# Patient Record
Sex: FEMALE | Race: WHITE | NOT HISPANIC OR LATINO | Employment: OTHER | URBAN - METROPOLITAN AREA
[De-identification: names, ages, dates, MRNs, and addresses within clinical notes are randomized per-mention and may not be internally consistent; named-entity substitution may affect disease eponyms.]

---

## 2022-01-31 ENCOUNTER — HOSPITAL ENCOUNTER (INPATIENT)
Facility: HOSPITAL | Age: 77
LOS: 7 days | Discharge: HOME HEALTH | DRG: 388 | End: 2022-02-08
Attending: EMERGENCY MEDICINE | Admitting: INTERNAL MEDICINE
Payer: MEDICARE

## 2022-01-31 ENCOUNTER — APPOINTMENT (EMERGENCY)
Dept: CT IMAGING | Facility: HOSPITAL | Age: 77
DRG: 388 | End: 2022-01-31
Payer: MEDICARE

## 2022-01-31 DIAGNOSIS — R93.3 ABNORMAL CT SCAN, ESOPHAGUS: ICD-10-CM

## 2022-01-31 DIAGNOSIS — R93.3 ABNORMAL CT SCAN, COLON: ICD-10-CM

## 2022-01-31 DIAGNOSIS — K56.7 ILEUS (HCC): ICD-10-CM

## 2022-01-31 DIAGNOSIS — U07.1 COVID-19: Primary | ICD-10-CM

## 2022-01-31 PROBLEM — R32 URINARY INCONTINENCE: Status: ACTIVE | Noted: 2022-01-31

## 2022-01-31 PROBLEM — G40.909 SEIZURE DISORDER (HCC): Status: ACTIVE | Noted: 2022-01-31

## 2022-01-31 PROBLEM — E87.1 HYPONATREMIA: Status: ACTIVE | Noted: 2022-01-31

## 2022-01-31 PROBLEM — I10 PRIMARY HYPERTENSION: Status: ACTIVE | Noted: 2022-01-31

## 2022-01-31 PROBLEM — E03.9 ACQUIRED HYPOTHYROIDISM: Status: ACTIVE | Noted: 2022-01-31

## 2022-01-31 PROBLEM — F20.89 OTHER SCHIZOPHRENIA (HCC): Status: ACTIVE | Noted: 2022-01-31

## 2022-01-31 PROBLEM — G20 PARKINSON'S DISEASE (HCC): Status: ACTIVE | Noted: 2022-01-31

## 2022-01-31 LAB
ALBUMIN SERPL BCP-MCNC: 3.7 G/DL (ref 3.5–5)
ALP SERPL-CCNC: 70 U/L (ref 34–104)
ALT SERPL W P-5'-P-CCNC: 5 U/L (ref 7–52)
ANION GAP SERPL CALCULATED.3IONS-SCNC: 6 MMOL/L (ref 4–13)
APTT PPP: 34 SECONDS (ref 23–37)
AST SERPL W P-5'-P-CCNC: 16 U/L (ref 13–39)
BASOPHILS # BLD AUTO: 0.03 THOUSANDS/ΜL (ref 0–0.1)
BASOPHILS NFR BLD AUTO: 0 % (ref 0–1)
BILIRUB SERPL-MCNC: 0.28 MG/DL (ref 0.2–1)
BILIRUB UR QL STRIP: NEGATIVE
BUN SERPL-MCNC: 49 MG/DL (ref 5–25)
CALCIUM SERPL-MCNC: 9.4 MG/DL (ref 8.4–10.2)
CARDIAC TROPONIN I PNL SERPL HS: 5 NG/L
CHLORIDE SERPL-SCNC: 95 MMOL/L (ref 96–108)
CK SERPL-CCNC: 104 U/L (ref 26–192)
CLARITY UR: ABNORMAL
CO2 SERPL-SCNC: 29 MMOL/L (ref 21–32)
COLOR UR: YELLOW
CREAT SERPL-MCNC: 0.92 MG/DL (ref 0.6–1.3)
CRP SERPL QL: 34.7 MG/L
D DIMER PPP FEU-MCNC: 1.2 UG/ML FEU
EOSINOPHIL # BLD AUTO: 0.08 THOUSAND/ΜL (ref 0–0.61)
EOSINOPHIL NFR BLD AUTO: 1 % (ref 0–6)
ERYTHROCYTE [DISTWIDTH] IN BLOOD BY AUTOMATED COUNT: 14.7 % (ref 11.6–15.1)
FLUAV RNA RESP QL NAA+PROBE: NEGATIVE
FLUBV RNA RESP QL NAA+PROBE: NEGATIVE
GFR SERPL CREATININE-BSD FRML MDRD: 61 ML/MIN/1.73SQ M
GLUCOSE SERPL-MCNC: 122 MG/DL (ref 65–140)
GLUCOSE UR STRIP-MCNC: NEGATIVE MG/DL
HCT VFR BLD AUTO: 34.9 % (ref 34.8–46.1)
HGB BLD-MCNC: 11.1 G/DL (ref 11.5–15.4)
HGB UR QL STRIP.AUTO: NEGATIVE
IMM GRANULOCYTES # BLD AUTO: 0.16 THOUSAND/UL (ref 0–0.2)
IMM GRANULOCYTES NFR BLD AUTO: 2 % (ref 0–2)
INR PPP: 0.99 (ref 0.84–1.19)
KETONES UR STRIP-MCNC: NEGATIVE MG/DL
LACTATE SERPL-SCNC: 0.5 MMOL/L (ref 0.5–2)
LEUKOCYTE ESTERASE UR QL STRIP: NEGATIVE
LYMPHOCYTES # BLD AUTO: 1.52 THOUSANDS/ΜL (ref 0.6–4.47)
LYMPHOCYTES NFR BLD AUTO: 18 % (ref 14–44)
MCH RBC QN AUTO: 27.8 PG (ref 26.8–34.3)
MCHC RBC AUTO-ENTMCNC: 31.8 G/DL (ref 31.4–37.4)
MCV RBC AUTO: 87 FL (ref 82–98)
MONOCYTES # BLD AUTO: 1.19 THOUSAND/ΜL (ref 0.17–1.22)
MONOCYTES NFR BLD AUTO: 14 % (ref 4–12)
NEUTROPHILS # BLD AUTO: 5.65 THOUSANDS/ΜL (ref 1.85–7.62)
NEUTS SEG NFR BLD AUTO: 65 % (ref 43–75)
NITRITE UR QL STRIP: NEGATIVE
NRBC BLD AUTO-RTO: 0 /100 WBCS
NT-PROBNP SERPL-MCNC: 253 PG/ML
PH UR STRIP.AUTO: 5.5 [PH]
PLATELET # BLD AUTO: 227 THOUSANDS/UL (ref 149–390)
PMV BLD AUTO: 9.4 FL (ref 8.9–12.7)
POTASSIUM SERPL-SCNC: 4.8 MMOL/L (ref 3.5–5.3)
PROCALCITONIN SERPL-MCNC: <0.05 NG/ML
PROT SERPL-MCNC: 7.2 G/DL (ref 6.4–8.4)
PROT UR STRIP-MCNC: NEGATIVE MG/DL
PROTHROMBIN TIME: 13 SECONDS (ref 11.6–14.5)
RBC # BLD AUTO: 4 MILLION/UL (ref 3.81–5.12)
RSV RNA RESP QL NAA+PROBE: NEGATIVE
SARS-COV-2 RNA RESP QL NAA+PROBE: POSITIVE
SODIUM SERPL-SCNC: 130 MMOL/L (ref 135–147)
SP GR UR STRIP.AUTO: >=1.03 (ref 1–1.03)
TSH SERPL DL<=0.05 MIU/L-ACNC: 5.14 UIU/ML (ref 0.45–5.33)
UROBILINOGEN UR QL STRIP.AUTO: 0.2 E.U./DL
VALPROATE SERPL-MCNC: 60 UG/ML (ref 50–100)
WBC # BLD AUTO: 8.63 THOUSAND/UL (ref 4.31–10.16)

## 2022-01-31 PROCEDURE — 36415 COLL VENOUS BLD VENIPUNCTURE: CPT | Performed by: PHYSICIAN ASSISTANT

## 2022-01-31 PROCEDURE — 93005 ELECTROCARDIOGRAM TRACING: CPT

## 2022-01-31 PROCEDURE — 85025 COMPLETE CBC W/AUTO DIFF WBC: CPT | Performed by: PHYSICIAN ASSISTANT

## 2022-01-31 PROCEDURE — 99285 EMERGENCY DEPT VISIT HI MDM: CPT

## 2022-01-31 PROCEDURE — 86140 C-REACTIVE PROTEIN: CPT | Performed by: NURSE PRACTITIONER

## 2022-01-31 PROCEDURE — 83935 ASSAY OF URINE OSMOLALITY: CPT | Performed by: NURSE PRACTITIONER

## 2022-01-31 PROCEDURE — 70450 CT HEAD/BRAIN W/O DYE: CPT

## 2022-01-31 PROCEDURE — 0241U HB NFCT DS VIR RESP RNA 4 TRGT: CPT | Performed by: PHYSICIAN ASSISTANT

## 2022-01-31 PROCEDURE — 83605 ASSAY OF LACTIC ACID: CPT | Performed by: NURSE PRACTITIONER

## 2022-01-31 PROCEDURE — 74177 CT ABD & PELVIS W/CONTRAST: CPT

## 2022-01-31 PROCEDURE — 84300 ASSAY OF URINE SODIUM: CPT | Performed by: NURSE PRACTITIONER

## 2022-01-31 PROCEDURE — 84443 ASSAY THYROID STIM HORMONE: CPT | Performed by: NURSE PRACTITIONER

## 2022-01-31 PROCEDURE — 82550 ASSAY OF CK (CPK): CPT | Performed by: NURSE PRACTITIONER

## 2022-01-31 PROCEDURE — 1123F ACP DISCUSS/DSCN MKR DOCD: CPT | Performed by: STUDENT IN AN ORGANIZED HEALTH CARE EDUCATION/TRAINING PROGRAM

## 2022-01-31 PROCEDURE — 85379 FIBRIN DEGRADATION QUANT: CPT | Performed by: NURSE PRACTITIONER

## 2022-01-31 PROCEDURE — 84484 ASSAY OF TROPONIN QUANT: CPT | Performed by: PHYSICIAN ASSISTANT

## 2022-01-31 PROCEDURE — 81003 URINALYSIS AUTO W/O SCOPE: CPT | Performed by: NURSE PRACTITIONER

## 2022-01-31 PROCEDURE — 80164 ASSAY DIPROPYLACETIC ACD TOT: CPT | Performed by: PHYSICIAN ASSISTANT

## 2022-01-31 PROCEDURE — 99285 EMERGENCY DEPT VISIT HI MDM: CPT | Performed by: PHYSICIAN ASSISTANT

## 2022-01-31 PROCEDURE — 85610 PROTHROMBIN TIME: CPT | Performed by: PHYSICIAN ASSISTANT

## 2022-01-31 PROCEDURE — XW033E5 INTRODUCTION OF REMDESIVIR ANTI-INFECTIVE INTO PERIPHERAL VEIN, PERCUTANEOUS APPROACH, NEW TECHNOLOGY GROUP 5: ICD-10-PCS | Performed by: NURSE PRACTITIONER

## 2022-01-31 PROCEDURE — 83880 ASSAY OF NATRIURETIC PEPTIDE: CPT | Performed by: NURSE PRACTITIONER

## 2022-01-31 PROCEDURE — 85730 THROMBOPLASTIN TIME PARTIAL: CPT | Performed by: PHYSICIAN ASSISTANT

## 2022-01-31 PROCEDURE — 84145 PROCALCITONIN (PCT): CPT | Performed by: NURSE PRACTITIONER

## 2022-01-31 PROCEDURE — 80053 COMPREHEN METABOLIC PANEL: CPT | Performed by: PHYSICIAN ASSISTANT

## 2022-01-31 PROCEDURE — 99220 PR INITIAL OBSERVATION CARE/DAY 70 MINUTES: CPT | Performed by: NURSE PRACTITIONER

## 2022-01-31 RX ORDER — ACETAMINOPHEN 325 MG/1
650 TABLET ORAL EVERY 6 HOURS PRN
Status: DISCONTINUED | OUTPATIENT
Start: 2022-01-31 | End: 2022-02-08 | Stop reason: HOSPADM

## 2022-01-31 RX ORDER — ZOLPIDEM TARTRATE 5 MG/1
5 TABLET ORAL
Status: DISCONTINUED | OUTPATIENT
Start: 2022-01-31 | End: 2022-02-08 | Stop reason: HOSPADM

## 2022-01-31 RX ORDER — AMLODIPINE BESYLATE 10 MG/1
10 TABLET ORAL DAILY
COMMUNITY

## 2022-01-31 RX ORDER — OMEPRAZOLE 20 MG/1
20 CAPSULE, DELAYED RELEASE ORAL DAILY
COMMUNITY

## 2022-01-31 RX ORDER — GABAPENTIN 100 MG/1
100 CAPSULE ORAL 3 TIMES DAILY
Status: DISCONTINUED | OUTPATIENT
Start: 2022-01-31 | End: 2022-02-08 | Stop reason: HOSPADM

## 2022-01-31 RX ORDER — DIVALPROEX SODIUM 250 MG/1
250 TABLET, DELAYED RELEASE ORAL DAILY
Status: DISCONTINUED | OUTPATIENT
Start: 2022-02-01 | End: 2022-02-08 | Stop reason: HOSPADM

## 2022-01-31 RX ORDER — HEPARIN SODIUM 5000 [USP'U]/ML
5000 INJECTION, SOLUTION INTRAVENOUS; SUBCUTANEOUS EVERY 8 HOURS SCHEDULED
Status: DISCONTINUED | OUTPATIENT
Start: 2022-01-31 | End: 2022-02-08 | Stop reason: HOSPADM

## 2022-01-31 RX ORDER — DIVALPROEX SODIUM 500 MG/1
500 TABLET, EXTENDED RELEASE ORAL
Status: DISCONTINUED | OUTPATIENT
Start: 2022-01-31 | End: 2022-02-08 | Stop reason: HOSPADM

## 2022-01-31 RX ORDER — SODIUM CHLORIDE, SODIUM GLUCONATE, SODIUM ACETATE, POTASSIUM CHLORIDE, MAGNESIUM CHLORIDE, SODIUM PHOSPHATE, DIBASIC, AND POTASSIUM PHOSPHATE .53; .5; .37; .037; .03; .012; .00082 G/100ML; G/100ML; G/100ML; G/100ML; G/100ML; G/100ML; G/100ML
100 INJECTION, SOLUTION INTRAVENOUS CONTINUOUS
Status: DISCONTINUED | OUTPATIENT
Start: 2022-01-31 | End: 2022-01-31

## 2022-01-31 RX ORDER — DIVALPROEX SODIUM 250 MG/1
250 TABLET, DELAYED RELEASE ORAL EVERY 8 HOURS SCHEDULED
COMMUNITY
End: 2022-01-31 | Stop reason: CLARIF

## 2022-01-31 RX ORDER — OXYBUTYNIN CHLORIDE 5 MG/1
5 TABLET, EXTENDED RELEASE ORAL DAILY
Status: DISCONTINUED | OUTPATIENT
Start: 2022-02-01 | End: 2022-01-31

## 2022-01-31 RX ORDER — LISINOPRIL 10 MG/1
10 TABLET ORAL DAILY
COMMUNITY

## 2022-01-31 RX ORDER — ASPIRIN 81 MG/1
81 TABLET ORAL DAILY
COMMUNITY

## 2022-01-31 RX ORDER — HYDROXYZINE HYDROCHLORIDE 25 MG/1
25 TABLET, FILM COATED ORAL 2 TIMES DAILY
COMMUNITY

## 2022-01-31 RX ORDER — ATORVASTATIN CALCIUM 40 MG/1
40 TABLET, FILM COATED ORAL DAILY
COMMUNITY

## 2022-01-31 RX ORDER — CEFTRIAXONE 1 G/50ML
1000 INJECTION, SOLUTION INTRAVENOUS EVERY 24 HOURS
Status: DISCONTINUED | OUTPATIENT
Start: 2022-01-31 | End: 2022-02-03

## 2022-01-31 RX ORDER — OLANZAPINE 7.5 MG/1
7.5 TABLET ORAL
COMMUNITY

## 2022-01-31 RX ORDER — OXYBUTYNIN CHLORIDE 5 MG/1
5 TABLET, EXTENDED RELEASE ORAL DAILY
COMMUNITY

## 2022-01-31 RX ORDER — LEVOTHYROXINE SODIUM 0.1 MG/1
100 TABLET ORAL
Status: DISCONTINUED | OUTPATIENT
Start: 2022-02-01 | End: 2022-02-08 | Stop reason: HOSPADM

## 2022-01-31 RX ORDER — PROPRANOLOL HYDROCHLORIDE 10 MG/1
10 TABLET ORAL 2 TIMES DAILY
COMMUNITY

## 2022-01-31 RX ORDER — DIVALPROEX SODIUM 500 MG/1
500 TABLET, EXTENDED RELEASE ORAL
COMMUNITY

## 2022-01-31 RX ORDER — PROPRANOLOL HYDROCHLORIDE 10 MG/1
10 TABLET ORAL 2 TIMES DAILY
Status: DISCONTINUED | OUTPATIENT
Start: 2022-01-31 | End: 2022-02-08 | Stop reason: HOSPADM

## 2022-01-31 RX ORDER — MECLIZINE HCL 12.5 MG/1
12.5 TABLET ORAL DAILY
Status: DISCONTINUED | OUTPATIENT
Start: 2022-01-31 | End: 2022-02-08 | Stop reason: HOSPADM

## 2022-01-31 RX ORDER — SODIUM CHLORIDE 9 MG/ML
100 INJECTION, SOLUTION INTRAVENOUS CONTINUOUS
Status: DISCONTINUED | OUTPATIENT
Start: 2022-01-31 | End: 2022-02-02

## 2022-01-31 RX ORDER — ATORVASTATIN CALCIUM 40 MG/1
40 TABLET, FILM COATED ORAL DAILY
Status: DISCONTINUED | OUTPATIENT
Start: 2022-02-01 | End: 2022-02-08 | Stop reason: HOSPADM

## 2022-01-31 RX ORDER — MECLIZINE HCL 12.5 MG/1
12.5 TABLET ORAL DAILY
COMMUNITY

## 2022-01-31 RX ORDER — GABAPENTIN 100 MG/1
100 CAPSULE ORAL 3 TIMES DAILY
COMMUNITY

## 2022-01-31 RX ORDER — AMLODIPINE BESYLATE 10 MG/1
10 TABLET ORAL DAILY
Status: DISCONTINUED | OUTPATIENT
Start: 2022-02-01 | End: 2022-02-08 | Stop reason: HOSPADM

## 2022-01-31 RX ORDER — CARBIDOPA AND LEVODOPA 25; 100 MG/1; MG/1
1 TABLET, ORALLY DISINTEGRATING ORAL 2 TIMES DAILY
COMMUNITY

## 2022-01-31 RX ORDER — POTASSIUM CHLORIDE 750 MG/1
10 TABLET, FILM COATED, EXTENDED RELEASE ORAL 2 TIMES DAILY
COMMUNITY

## 2022-01-31 RX ORDER — LEVOTHYROXINE SODIUM 0.1 MG/1
100 TABLET ORAL DAILY
COMMUNITY

## 2022-01-31 RX ADMIN — IOHEXOL 100 ML: 350 INJECTION, SOLUTION INTRAVENOUS at 15:57

## 2022-01-31 RX ADMIN — HEPARIN SODIUM 5000 UNITS: 5000 INJECTION INTRAVENOUS; SUBCUTANEOUS at 19:20

## 2022-01-31 RX ADMIN — DIVALPROEX SODIUM 500 MG: 500 TABLET, EXTENDED RELEASE ORAL at 22:12

## 2022-01-31 RX ADMIN — PROPRANOLOL HYDROCHLORIDE 10 MG: 10 TABLET ORAL at 19:20

## 2022-01-31 RX ADMIN — GABAPENTIN 100 MG: 100 CAPSULE ORAL at 20:48

## 2022-01-31 RX ADMIN — OLANZAPINE 7.5 MG: 5 TABLET, FILM COATED ORAL at 22:12

## 2022-01-31 RX ADMIN — ZOLPIDEM TARTRATE 5 MG: 5 TABLET ORAL at 22:12

## 2022-01-31 RX ADMIN — REMDESIVIR 200 MG: 100 INJECTION, POWDER, LYOPHILIZED, FOR SOLUTION INTRAVENOUS at 19:18

## 2022-01-31 RX ADMIN — SODIUM CHLORIDE 500 ML: 0.9 INJECTION, SOLUTION INTRAVENOUS at 20:49

## 2022-01-31 RX ADMIN — CARBIDOPA AND LEVODOPA 1 TABLET: 25; 100 TABLET ORAL at 20:49

## 2022-01-31 RX ADMIN — SODIUM CHLORIDE 100 ML/HR: 0.9 INJECTION, SOLUTION INTRAVENOUS at 19:18

## 2022-01-31 NOTE — H&P
Jose 46  H&P- Olamide Connor 1945, 77 y o  female MRN: [id number]  Unit/Bed#: ED 25 Encounter: [id number]  Primary Care Provider: No primary care provider on file  Date and time admitted to hospital: 1/31/2022  1:56 PM    * [facility])  Assessment & Plan  · As evident on CT a/p with severe distension of the colon with gas and stool with a rub transition at the proximal sigmoid  Suspected this could be related to a colonic ileus  Distal esophagus is distended with material    · Consult gastroenterology  · Keep NPO except meds  · IV fluid  · No acute abdomen on exam  · Supportive care    COVID-19  Assessment & Plan  · Patient does not require any oxygen at this time  · Admitted under mild pathway  · Due to her comorbidity, started on remdesivir day #1/5  · D-dimers pending- continue on heparin 5000 units t i d   For now  · CT abdomen and pelvis- without evidence of pneumonia  · Check inflammatory markers  · Incentive spirometry, prone/side-lying position, cough and deep breathing    Seizure disorder (HCC)  Assessment & Plan  · Valproic acid level-60  · Continue with home regimen    Hyponatremia  Assessment & Plan  · Likely due to hypovolemia hyponatremia with recent vomiting  · Gentle IVF   · Monitor Na level     Acquired hypothyroidism  Assessment & Plan  · Check TSH   · Continue with levothyroxine    Primary hypertension  Assessment & Plan  · Monitor BP closely   · Hold off on lisinopril for now while NPO   · Continue with propranolol and amlodipine     Parkinson's disease ([facility] [address] )  Assessment & Plan  · Continue sinemet   · PT/OT evaluation     Other schizophrenia ([facility] [address] )  Assessment & Plan  · Continue with home regimen    VTE Prophylaxis: Heparin  Code Status: full code   POLST: POLST is not applicable to this patient  Discussion with family: Mehrdad Rodriguez [phone]    Anticipated Length of Stay:  Patient will be admitted on an Observation basis with an anticipated length of stay of  < 2 midnights  Justification for Hospital Stay: ileus     Chief Complaint:   Generalized weakness    History of Present Illness:    Lashae Avila is a 77 y o  female who presents with generalized weakness  The patient past medical history of Parkinson's, seizure disorder, and hypertension  She is a resident of [facility] who was brought in to the ED with increasing generalized weakness and urinary frequency  The patient is somewhat a poor historian  She states that she has been feeling okay however after lunch time she developed nausea and had 1 episodes of nonbloody vomiting  She complains of some mild generalized abdominal pain than however this has resolved currently  She denies any fevers, chills, nausea, vomiting, abdominal pain currently  In the ED, the patient was found to have COVID-19 positive  She does not require any oxygen currently  CT scan of the abdomen and pelvis shows suspected colonic ileus with distended distal esophagus  UA is pending currently  Review of Systems:  Review of Systems   Constitutional: Negative for activity change, appetite change, chills, diaphoresis and fever  HENT: Negative for congestion, ear pain, hearing loss, tinnitus and trouble swallowing  Eyes: Negative for photophobia, pain, discharge, itching and visual disturbance  Respiratory: Negative for cough, shortness of breath, wheezing and stridor  Cardiovascular: Negative for chest pain, palpitations and leg swelling  Gastrointestinal: Positive for nausea and vomiting  Negative for abdominal pain, blood in stool, constipation and diarrhea  Endocrine: Negative for cold intolerance, heat intolerance, polydipsia, polyphagia and polyuria  Genitourinary: Negative for difficulty urinating, dysuria, frequency, hematuria and urgency  Musculoskeletal: Negative for back pain, gait problem and neck stiffness  Skin: Negative for pallor, rash and wound     Allergic/Immunologic: Negative for environmental allergies, food allergies and immunocompromised state  Neurological: Negative for dizziness, tremors, speech difficulty, weakness, light-headedness, numbness and headaches  Hematological: Negative for adenopathy  Does not bruise/bleed easily  Psychiatric/Behavioral: Negative for confusion, hallucinations and sleep disturbance  Past Medical and Surgical History:   Past Medical History:   Diagnosis Date    Disease of thyroid gland     GERD (gastroesophageal reflux disease)     Hypertension     Mood disorder ([place] [id number] )     Neuropathy     Osteoporosis     Parkinson disease ([place] [id number] )     Schizophrenia ([place] [id number] )     Seizure ([place] [address] )     Urinary incontinence     Vitamin D deficiency        History reviewed  No pertinent surgical history  Meds/Allergies:  Prior to Admission medications    Medication Sig Start Date End Date Taking?  Authorizing Provider   amLODIPine (NORVASC) 10 mg tablet Take 10 mg by mouth daily   Yes Historical Provider, MD   aspirin (ECOTRIN LOW STRENGTH) 81 mg EC tablet Take 81 mg by mouth daily   Yes Historical Provider, MD   atorvastatin (LIPITOR) 40 mg tablet Take 40 mg by mouth daily nightly   Yes Historical Provider, MD   carbidopa-levodopa (PARCOPA)  mg per disintegrating tablet Take 1 tablet by mouth 2 (two) times a day   Yes Historical Provider, MD   divalproex sodium (Depakote ER) 500 mg 24 hr tablet Take 500 mg by mouth daily at bedtime Daily at bedtime 8 pm   Yes Historical Provider, MD   Divalproex Sodium (DEPAKOTE PO) Take 250 mg by mouth in the morning Daily at 8 am   Yes Historical Provider, MD   gabapentin (NEURONTIN) 100 mg capsule Take 100 mg by mouth 3 (three) times a day   Yes Historical Provider, MD   hydrOXYzine HCL (ATARAX) 25 mg tablet Take 25 mg by mouth 2 (two) times a day   Yes Historical Provider, MD   levothyroxine 100 mcg tablet Take 100 mcg by mouth daily   Yes Historical Provider, MD   lisinopril (ZESTRIL) 10 mg tablet Take 10 mg by mouth daily   Yes Historical Provider, MD   meclizine (ANTIVERT) 12 5 MG tablet Take 12 5 mg by mouth in the morning Daily at 8 am   Yes Historical Provider, MD   OLANZapine (ZyPREXA) 7 5 mg tablet Take 7 5 mg by mouth daily at bedtime   Yes Historical Provider, MD   omeprazole (PriLOSEC) 20 mg delayed release capsule Take 20 mg by mouth daily   Yes Historical Provider, MD   oxybutynin (DITROPAN-XL) 5 mg 24 hr tablet Take 5 mg by mouth daily   Yes Historical Provider, MD   potassium chloride (Klor-Con) 10 mEq tablet Take 10 mEq by mouth 2 (two) times a day   Yes Historical Provider, MD   propranolol (INDERAL) 10 mg tablet Take 10 mg by mouth 2 (two) times a day   Yes Historical Provider, MD   Skin Protectants, Misc  (MINERIN CREME EX) Apply topically as needed (dry skin) Twice daily prn   Yes Historical Provider, MD   ZOLPIDEM TARTRATE PO Take 10 mg by mouth daily at bedtime   Yes Historical Provider, MD   divalproex sodium (Depakote) 250 mg EC tablet Take 250 mg by mouth every 8 (eight) hours  1/31/22 Yes Historical Provider, MD     I have reveiwed home medications using records provided by [facility]  Allergies:    Allergies   Allergen Reactions    Pineapple - Food Allergy Other (See Comments)     Unknown reaction       Social History:  Marital Status: Single   Occupation: n/a   Patient Pre-hospital Living Situation: personal care home   Patient Pre-hospital Level of Mobility: independent   Patient Pre-hospital Diet Restrictions: none   Substance Use History:   Social History     Substance and Sexual Activity   Alcohol Use Never     Social History     Tobacco Use   Smoking Status Never Smoker   Smokeless Tobacco Never Used     Social History     Substance and Sexual Activity   Drug Use Never       Family History:  I have reviewed the patients family history    Physical Exam:   Vitals:   Blood Pressure: (!) 140/101 (01/31/22 1410)  Pulse: 60 (01/31/22 1410)  Temperature: (!) 96 1 °F (35 6 °C) (01/31/22 1410)  Respirations: 20 (01/31/22 1410)  SpO2: 96 % (01/31/22 1410)    Physical Exam  Vitals and nursing note reviewed  Constitutional:       General: She is not in acute distress  Appearance: Normal appearance  She is obese  HENT:      Head: Normocephalic and atraumatic  Right Ear: External ear normal       Left Ear: External ear normal       Nose: Nose normal  No rhinorrhea  Mouth/Throat:      Mouth: Mucous membranes are moist       Pharynx: Oropharynx is clear  Eyes:      General:         Right eye: No discharge  Left eye: No discharge  Pupils: Pupils are equal, round, and reactive to light  Cardiovascular:      Rate and Rhythm: Normal rate and regular rhythm  Pulses: Normal pulses  Heart sounds: Normal heart sounds  No murmur heard  Pulmonary:      Effort: Pulmonary effort is normal  No respiratory distress  Breath sounds: Normal breath sounds  Abdominal:      General: Bowel sounds are normal  There is distension  Palpations: Abdomen is soft  There is no mass  Tenderness: There is no abdominal tenderness  Musculoskeletal:         General: No swelling or tenderness  Normal range of motion  Cervical back: Normal range of motion and neck supple  No muscular tenderness  Right lower leg: Edema present  Left lower leg: Edema present  Skin:     General: Skin is warm and dry  Capillary Refill: Capillary refill takes less than 2 seconds  Findings: No erythema or rash  Neurological:      General: No focal deficit present  Mental Status: She is alert and oriented to person, place, and time  Mental status is at baseline  Psychiatric:         Mood and Affect: Mood normal          Behavior: Behavior normal          Additional Data:   Lab Results: I have personally reviewed pertinent reports      Results from last 7 days   Lab Units 01/31/22  1451   WBC Thousand/uL 8 63   HEMOGLOBIN g/dL 11 1*   HEMATOCRIT % 34 9   PLATELETS Thousands/uL 227 NEUTROS PCT % 65   LYMPHS PCT % 18   MONOS PCT % 14*   EOS PCT % 1     Results from last 7 days   Lab Units 01/31/22  1451   SODIUM mmol/L 130*   POTASSIUM mmol/L 4 8   CHLORIDE mmol/L 95*   CO2 mmol/L 29   BUN mg/dL 49*   CREATININE mg/dL 0 92   CALCIUM mg/dL 9 4   ALK PHOS U/L 70   ALT U/L 5*   AST U/L 16     Results from last 7 days   Lab Units 01/31/22  1451   INR  0 99               Imaging: I have personally reviewed pertinent reports  CT abdomen pelvis with contrast   Final Result by Amaury Condon MD (01/31 1613)         1  Severe distention of the colon with gas and stool with abrupt transition at the proximal sigmoid  While a colonic ileus can be considered, a subtle obstructing process at the proximal sigmoid colon is not excluded and follow-up colonoscopy is    recommended  2   The visualized distal esophagus is distended with material   Uncertain if this is related to the clinical history of vomiting  Cannot exclude a distal esophageal process such as achalasia  The study was marked in [facility] for immediate notification  Workstation performed: [id number]         CT head without contrast   Final Result by Pat Mary MD (01/31 1614)      Probable 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, age indeterminate  Otherwise, no evidence of acute intracranial abnormality  Workstation performed: [id number]             EKG, Pathology, and Other Studies Reviewed on Admission:   · EKG: n/a     Epic Records Reviewed: Yes     ** Please Note: This note has been constructed using a voice recognition system   **

## 2022-01-31 NOTE — ASSESSMENT & PLAN NOTE
· Monitor BP closely   · Hold off on lisinopril for now while NPO   · Continue with propranolol and amlodipine

## 2022-01-31 NOTE — ASSESSMENT & PLAN NOTE
· As evident on CT a/p with severe distension of the colon with gas and stool with a rub transition at the proximal sigmoid  Suspected this could be related to a colonic ileus    Distal esophagus is distended with material    · Consult gastroenterology  · Keep NPO except meds  · IV fluid  · No acute abdomen on exam  · Supportive care

## 2022-01-31 NOTE — ASSESSMENT & PLAN NOTE
· Patient does not require any oxygen at this time  · Admitted under mild pathway  · Due to her comorbidity, started on remdesivir day #1/5  · D-dimers pending- continue on heparin 5000 units t i d   For now  · CT abdomen and pelvis- without evidence of pneumonia  · Check inflammatory markers  · Incentive spirometry, prone/side-lying position, cough and deep breathing

## 2022-01-31 NOTE — ED PROVIDER NOTES
History  Chief Complaint   Patient presents with    Possible UTI     EMS reports patient has been taking more frequent trips to the bathroom with foul smelling urine   Weakness - Generalized     EMS reports patient moving slowly today      61-year-old female history of Parkinson's presents from assisted living facility via EMS with complaints of generalized weakness  Patient reports that she had poor appetite today  Velarde as if she could not swallow  Had an episode of nonbloody nonbilious vomiting  Denies any other complaints  The patient denies any fevers, chills, headache, dizziness, sore throat, cough, shortness of breath, chest pain, abdominal pain, nausea, vomiting, diarrhea, dysuria, polyuria, hematuria, melena, hematochezia, lower leg pain or swelling  Additional history provided by [facility] from patient's assisted living facility  They report patient normally ambulates with a walker however she has been much more generally weak recently  Not wanting to get out of her room or out of bed  They deny any recent falls or trauma  Denies any fevers  Denies any known sick contacts  Report patient has recently been treated for UTI, has had very foul-smelling urine and frequent trips to the bathroom  Prior to Admission Medications   Prescriptions Last Dose Informant Patient Reported? Taking? Divalproex Sodium (DEPAKOTE PO)   Yes Yes   Sig: Take 250 mg by mouth in the morning Daily at 8 am   OLANZapine (ZyPREXA) 7 5 mg tablet   Yes Yes   Sig: Take 7 5 mg by mouth daily at bedtime   Skin Protectants, Misc   (MINERIN CREME EX)   Yes Yes   Sig: Apply topically as needed (dry skin) Twice daily prn   ZOLPIDEM TARTRATE PO   Yes Yes   Sig: Take 10 mg by mouth daily at bedtime   amLODIPine (NORVASC) 10 mg tablet   Yes Yes   Sig: Take 10 mg by mouth daily   aspirin (ECOTRIN LOW STRENGTH) 81 mg EC tablet   Yes Yes   Sig: Take 81 mg by mouth daily   atorvastatin (LIPITOR) 40 mg tablet   Yes Yes   Sig: Take 40 mg by mouth daily nightly   carbidopa-levodopa (PARCOPA)  mg per disintegrating tablet   Yes Yes   Sig: Take 1 tablet by mouth 2 (two) times a day   divalproex sodium (Depakote ER) 500 mg 24 hr tablet   Yes Yes   Sig: Take 500 mg by mouth daily at bedtime Daily at bedtime 8 pm   gabapentin (NEURONTIN) 100 mg capsule   Yes Yes   Sig: Take 100 mg by mouth 3 (three) times a day   hydrOXYzine HCL (ATARAX) 25 mg tablet   Yes Yes   Sig: Take 25 mg by mouth 2 (two) times a day   levothyroxine 100 mcg tablet   Yes Yes   Sig: Take 100 mcg by mouth daily   lisinopril (ZESTRIL) 10 mg tablet   Yes Yes   Sig: Take 10 mg by mouth daily   meclizine (ANTIVERT) 12 5 MG tablet   Yes Yes   Sig: Take 12 5 mg by mouth in the morning Daily at 8 am   omeprazole (PriLOSEC) 20 mg delayed release capsule   Yes Yes   Sig: Take 20 mg by mouth daily   oxybutynin (DITROPAN-XL) 5 mg 24 hr tablet   Yes Yes   Sig: Take 5 mg by mouth daily   potassium chloride (Klor-Con) 10 mEq tablet   Yes Yes   Sig: Take 10 mEq by mouth 2 (two) times a day   propranolol (INDERAL) 10 mg tablet   Yes Yes   Sig: Take 10 mg by mouth 2 (two) times a day      Facility-Administered Medications: None       Past Medical History:   Diagnosis Date    Disease of thyroid gland     GERD (gastroesophageal reflux disease)     Hypertension     Mood disorder (HCC)     Neuropathy     Osteoporosis     [facility] [address] [facility]     Schizophrenia ([facility] )     Seizure ([facility] )     Urinary incontinence     Vitamin D deficiency        History reviewed  No pertinent surgical history  Family History   Problem Relation Age of Onset    No Known Problems Mother     No Known Problems Father      I have reviewed and agree with the history as documented      E-Cigarette/Vaping    E-Cigarette Use Never User      E-Cigarette/Vaping Substances     Social History     Tobacco Use    Smoking status: Never Smoker    Smokeless tobacco: Never Used   Vaping Use    Vaping Use: Never used Substance Use Topics    Alcohol use: Never    Drug use: Never       Review of Systems   Constitutional: Negative for chills, fatigue and fever  HENT: Negative for ear pain and sore throat  Eyes: Negative for pain  Respiratory: Negative for cough, shortness of breath and wheezing  Cardiovascular: Negative for chest pain, palpitations and leg swelling  Gastrointestinal: Positive for vomiting  Negative for abdominal pain, constipation, diarrhea and nausea  Endocrine: Negative for polyuria  Genitourinary: Negative for dysuria and pelvic pain  Musculoskeletal: Negative for arthralgias, myalgias, neck pain and neck stiffness  Skin: Negative for rash  Neurological: Negative for dizziness, syncope, light-headedness and headaches  All other systems reviewed and are negative  Physical Exam  Physical Exam  Constitutional:       Appearance: She is well-developed  HENT:      Head: Normocephalic and atraumatic  Right Ear: External ear normal       Left Ear: External ear normal       Mouth/Throat:      Pharynx: No oropharyngeal exudate  Eyes:      Extraocular Movements: Extraocular movements intact  Cardiovascular:      Rate and Rhythm: Normal rate and regular rhythm  Heart sounds: Normal heart sounds  Pulmonary:      Effort: Pulmonary effort is normal       Breath sounds: Normal breath sounds  Abdominal:      General: Bowel sounds are normal  There is distension  Palpations: Abdomen is soft  Tenderness: There is no abdominal tenderness  Comments: Distended and firm abdomen is some generalized tenderness   Musculoskeletal:         General: Normal range of motion  Cervical back: Normal range of motion  Skin:     General: Skin is warm  Capillary Refill: Capillary refill takes less than 2 seconds  Neurological:      General: No focal deficit present  Mental Status: She is alert and oriented to person, place, and time     Psychiatric:         Mood and Affect: Mood normal          Vital Signs  ED Triage Vitals   Temperature Pulse Respirations Blood Pressure SpO2   01/31/22 1410 01/31/22 1410 01/31/22 1410 01/31/22 1410 01/31/22 1410   (!) 96 1 °F (35 6 °C) 60 20 (!) 140/101 96 %      Temp Source Heart Rate Source Patient Position - Orthostatic VS BP Location FiO2 (%)   01/31/22 2019 -- 01/31/22 2019 01/31/22 2019 --   Temporal  Lying Left arm       Pain Score       01/31/22 1410       No Pain           Vitals:    01/31/22 1410 01/31/22 1920 01/31/22 1940 01/31/22 2019   BP: (!) 140/101 148/91 148/91 160/95   Pulse: 60 75 72 67   Patient Position - Orthostatic VS:    Lying         Visual Acuity      ED Medications  Medications   sodium chloride 0 9 % bolus 500 mL (has no administration in time range)   amLODIPine (NORVASC) tablet 10 mg (has no administration in time range)   atorvastatin (LIPITOR) tablet 40 mg (has no administration in time range)   carbidopa-levodopa (SINEMET)  mg per tablet 1 tablet (has no administration in time range)   divalproex sodium (DEPAKOTE ER) 24 hr tablet 500 mg (has no administration in time range)   divalproex sodium (DEPAKOTE) EC tablet 250 mg (has no administration in time range)   gabapentin (NEURONTIN) capsule 100 mg (has no administration in time range)   levothyroxine tablet 100 mcg (has no administration in time range)   meclizine (ANTIVERT) tablet 12 5 mg (0 mg Oral Hold 1/31/22 1853)   OLANZapine (ZyPREXA) tablet 7 5 mg (has no administration in time range)   propranolol (INDERAL) tablet 10 mg (10 mg Oral Given 1/31/22 1920)   zolpidem (AMBIEN) tablet 5 mg (has no administration in time range)   acetaminophen (TYLENOL) tablet 650 mg (has no administration in time range)   heparin (porcine) subcutaneous injection 5,000 Units (5,000 Units Subcutaneous Given 1/31/22 1920)   remdesivir (Veklury) 200 mg in sodium chloride 0 9 % 290 mL IVPB (200 mg Intravenous New Bag 1/31/22 1918)     Followed by   remdesivir Daria Mary 100 mg in sodium chloride 0 9 % 270 mL IVPB (has no administration in time range)   sodium chloride 0 9 % infusion (100 mL/hr Intravenous New Bag 1/31/22 1918)   cefTRIAXone (ROCEPHIN) IVPB (premix in dextrose) 1,000 mg 50 mL (has no administration in time range)   iohexol (OMNIPAQUE) 350 MG/ML injection (SINGLE-DOSE) 100 mL (100 mL Intravenous Given 1/31/22 1557)       Diagnostic Studies  Results Reviewed     Procedure Component Value Units Date/Time    Procalcitonin with AM Reflex [[id number]]  (Normal) Collected: 01/31/22 1919    Lab Status: Final result Specimen: Blood from Arm, Right Updated: 01/31/22 2011     Procalcitonin <0 05 ng/ml     Procalcitonin Reflex [[id number]]     Lab Status: No result Specimen: Blood     Lactic acid, plasma [[id number]]  (Normal) Collected: 01/31/22 1919    Lab Status: Final result Specimen: Blood from Arm, Right Updated: 01/31/22 2004     LACTIC ACID 0 5 mmol/L     Narrative:      Result may be elevated if tourniquet was used during collection  UA w Reflex to Microscopic w Reflex to Culture [[id number]]  (Abnormal) Collected: 01/31/22 1926    Lab Status: Final result Specimen: Urine, Straight Cath Updated: 01/31/22 1945     Color, UA Yellow     Clarity, UA Slightly Cloudy     Specific Gravity, UA >=1 030     pH, UA 5 5     Leukocytes, UA Negative     Nitrite, UA Negative     Protein, UA Negative mg/dl      Glucose, UA Negative mg/dl      Ketones, UA Negative mg/dl      Urobilinogen, UA 0 2 E U /dl      Bilirubin, UA Negative     Blood, UA Negative    C-reactive protein [[id number]] Collected: 01/31/22 1919    Lab Status:  In process Specimen: Blood from Arm, Right Updated: 01/31/22 1943    D-dimer, quantitative [[id number]]  (Abnormal) Collected: 01/31/22 1451    Lab Status: Final result Specimen: Blood from Arm, Right Updated: 01/31/22 1844     D-Dimer, Quant 1 20 ug/ml FEU     TSH, 3rd generation [[id number]]  (Normal) Collected: 01/31/22 1451    Lab Status: Final result Specimen: Blood from Arm, Right Updated: 01/31/22 1833     TSH 3RD GENERATON 5 136 uIU/mL     Narrative:      Patients undergoing fluorescein dye angiography may retain small amounts of fluorescein in the body for 48-72 hours post procedure  Samples containing fluorescein can produce falsely depressed TSH values  If the patient had this procedure,a specimen should be resubmitted post fluorescein clearance  CK (with reflex to MB) [[id number]]  (Normal) Collected: 01/31/22 1451    Lab Status: Final result Specimen: Blood from Arm, Right Updated: 01/31/22 1819     Total  U/L     NT-BNP PRO [[id number]] Collected: 01/31/22 1451    Lab Status: In process Specimen: Blood from Arm, Right Updated: 01/31/22 1804    Osmolality, urine [[id number]]     Lab Status: No result Specimen: Urine     Sodium, urine, random [[id number]]     Lab Status: No result Specimen: Urine     Valproic acid level, total [[id number]]  (Normal) Collected: 01/31/22 1451    Lab Status: Final result Specimen: Blood from Arm, Right Updated: 01/31/22 1603     Valproic Acid, Total 60 ug/mL     COVID/FLU/RSV - 2 hour TAT [[id number]]  (Abnormal) Collected: 01/31/22 1451    Lab Status: Final result Specimen: Nasopharyngeal Swab Updated: 01/31/22 1546     SARS-CoV-2 Positive     INFLUENZA A PCR Negative     INFLUENZA B PCR Negative     RSV PCR Negative    Narrative:      FOR PEDIATRIC PATIENTS - copy/paste COVID Guidelines URL to browser: [email or web address]  [email or web address]    SARS-CoV-2 assay is a Nucleic Acid Amplification assay intended for the  qualitative detection of nucleic acid from SARS-CoV-2 in nasopharyngeal  swabs  Results are for the presumptive identification of SARS-CoV-2 RNA  Positive results are indicative of infection with SARS-CoV-2, the virus  causing COVID-19, but do not rule out bacterial infection or co-infection  with other viruses   Laboratories within the [place] and its  territories are required to report all positive results to the appropriate  public health authorities  Negative results do not preclude SARS-CoV-2  infection and should not be used as the sole basis for treatment or other  patient management decisions  Negative results must be combined with  clinical observations, patient history, and epidemiological information  This test has not been FDA cleared or approved  This test has been authorized by FDA under an Emergency Use Authorization  (EUA)  This test is only authorized for the duration of time the  declaration that circumstances exist justifying the authorization of the  emergency use of an in vitro diagnostic tests for detection of SARS-CoV-2  virus and/or diagnosis of COVID-19 infection under section 564(b)(1) of  the Act, 21 U  S C  099TPR-6(X)(2), unless the authorization is terminated  or revoked sooner  The test has been validated but independent review by FDA  and CLIA is pending  Test performed using [employer] GeneXpert: This RT-PCR assay targets N2,  a region unique to SARS-CoV-2  A conserved region in the E-gene was chosen  for pan-Sarbecovirus detection which includes SARS-CoV-2      HS Troponin 0hr (reflex protocol) [[id number]]  (Normal) Collected: 01/31/22 1451    Lab Status: Final result Specimen: Blood from Arm, Right Updated: 01/31/22 1533     hs TnI 0hr 5 ng/L     Comprehensive metabolic panel [[id number]]  (Abnormal) Collected: 01/31/22 1451    Lab Status: Final result Specimen: Blood from Arm, Right Updated: 01/31/22 1531     Sodium 130 mmol/L      Potassium 4 8 mmol/L      Chloride 95 mmol/L      CO2 29 mmol/L      ANION GAP 6 mmol/L      BUN 49 mg/dL      Creatinine 0 92 mg/dL      Glucose 122 mg/dL      Calcium 9 4 mg/dL      AST 16 U/L      ALT 5 U/L      Alkaline Phosphatase 70 U/L      Total Protein 7 2 g/dL      Albumin 3 7 g/dL      Total Bilirubin 0 28 mg/dL      eGFR 61 ml/min/1 73sq m     Narrative:      [place] guidelines for Chronic Kidney Disease (CKD):     Stage 1 with normal or high GFR (GFR > 90 mL/min/1 73 square meters)    Stage 2 Mild CKD (GFR = 60-89 mL/min/1 73 square meters)    Stage 3A Moderate CKD (GFR = 45-59 mL/min/1 73 square meters)    Stage 3B Moderate CKD (GFR = 30-44 mL/min/1 73 square meters)    Stage 4 Severe CKD (GFR = 15-29 mL/min/1 73 square meters)    Stage 5 End Stage CKD (GFR <15 mL/min/1 73 square meters)  Note: GFR calculation is accurate only with a steady state creatinine    Protime-INR [[id number]]  (Normal) Collected: 01/31/22 1451    Lab Status: Final result Specimen: Blood from Arm, Right Updated: 01/31/22 1521     Protime 13 0 seconds      INR 0 99    APTT [[id number]]  (Normal) Collected: 01/31/22 1451    Lab Status: Final result Specimen: Blood from Arm, Right Updated: 01/31/22 1521     PTT 34 seconds     CBC and differential [[id number]]  (Abnormal) Collected: 01/31/22 1451    Lab Status: Final result Specimen: Blood from Arm, Right Updated: 01/31/22 1502     WBC 8 63 Thousand/uL      RBC 4 00 Million/uL      Hemoglobin 11 1 g/dL      Hematocrit 34 9 %      MCV 87 fL      MCH 27 8 pg      MCHC 31 8 g/dL      RDW 14 7 %      MPV 9 4 fL      Platelets 095 Thousands/uL      nRBC 0 /100 WBCs      Neutrophils Relative 65 %      Immat GRANS % 2 %      Lymphocytes Relative 18 %      Monocytes Relative 14 %      Eosinophils Relative 1 %      Basophils Relative 0 %      Neutrophils Absolute 5 65 Thousands/µL      Immature Grans Absolute 0 16 Thousand/uL      Lymphocytes Absolute 1 52 Thousands/µL      Monocytes Absolute 1 19 Thousand/µL      Eosinophils Absolute 0 08 Thousand/µL      Basophils Absolute 0 03 Thousands/µL                  CT abdomen pelvis with contrast   Final Result by Eleno Subramanian MD (01/31 1613)         1  Severe distention of the colon with gas and stool with abrupt transition at the proximal sigmoid    While a colonic ileus can be considered, a subtle obstructing process at the proximal sigmoid colon is not excluded and follow-up colonoscopy is    recommended  2   The visualized distal esophagus is distended with material   Uncertain if this is related to the clinical history of vomiting  Cannot exclude a distal esophageal process such as achalasia  The study was marked in [facility] for immediate notification  Workstation performed: [id number]         CT head without contrast   Final Result by Calos Medina MD (01/31 1614)      Probable 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, age indeterminate  Otherwise, no evidence of acute intracranial abnormality  Workstation performed: [id number]                    Procedures  Procedures         ED Course  ED Course as of 01/31/22 2047 Mon Jan 31, 2022   1426 Lindy Villagomez from [address] assisted living([phone]) provides additional history, very confused, different from baseline, having trouble walking  Normally uses walker  Very slow with walking today  4 hours to get her out of her room  Choking on her food  Not acting herself  Urine smells bad  Treated for a UTI  Originally treated with Keflex, course completed  Then on Cipro  Sees Shauna Lung  [phone]  [employer]  MDM  Number of Diagnoses or Management Options  COVID-19  [facility])  Diagnosis management comments: Patient presented with generalized weakness  Found to be COVID positive  Abdomen distended and firm on exam   CT shows possible ileus  Discussed case with Bryson Blank of GI  Recommends admission with likely plan for colonoscopy tomorrow        Disposition  Final diagnoses:   COVID-19   [facility] ([address] )     Time reflects when diagnosis was documented in both MDM as applicable and the Disposition within this note     Time User Action Codes Description Comment    1/31/2022  5:10 PM Twyla Almaraz Add [U07 1] COVID-19     1/31/2022  5:10 PM Yony Rebecca Recio [K56 [id number]] [facility])       ED Disposition     ED Disposition Condition Date/Time Comment    Admit Stable Mon Jan 31, 2022  5:10 PM Case was discussed with Stone Hurd and the patient's admission status was agreed to be Admission Status: observation status to the service of Dr Minerva Samayoa  Follow-up Information    None         Current Discharge Medication List      CONTINUE these medications which have NOT CHANGED    Details   amLODIPine (NORVASC) 10 mg tablet Take 10 mg by mouth daily      aspirin (ECOTRIN LOW STRENGTH) 81 mg EC tablet Take 81 mg by mouth daily      atorvastatin (LIPITOR) 40 mg tablet Take 40 mg by mouth daily nightly      carbidopa-levodopa (PARCOPA)  mg per disintegrating tablet Take 1 tablet by mouth 2 (two) times a day      divalproex sodium (Depakote ER) 500 mg 24 hr tablet Take 500 mg by mouth daily at bedtime Daily at bedtime 8 pm      Divalproex Sodium (DEPAKOTE PO) Take 250 mg by mouth in the morning Daily at 8 am      gabapentin (NEURONTIN) 100 mg capsule Take 100 mg by mouth 3 (three) times a day      hydrOXYzine HCL (ATARAX) 25 mg tablet Take 25 mg by mouth 2 (two) times a day      levothyroxine 100 mcg tablet Take 100 mcg by mouth daily      lisinopril (ZESTRIL) 10 mg tablet Take 10 mg by mouth daily      meclizine (ANTIVERT) 12 5 MG tablet Take 12 5 mg by mouth in the morning Daily at 8 am      OLANZapine (ZyPREXA) 7 5 mg tablet Take 7 5 mg by mouth daily at bedtime      omeprazole (PriLOSEC) 20 mg delayed release capsule Take 20 mg by mouth daily      oxybutynin (DITROPAN-XL) 5 mg 24 hr tablet Take 5 mg by mouth daily      potassium chloride (Klor-Con) 10 mEq tablet Take 10 mEq by mouth 2 (two) times a day      propranolol (INDERAL) 10 mg tablet Take 10 mg by mouth 2 (two) times a day      Skin Protectants, Misc   (MINERIN CREME EX) Apply topically as needed (dry skin) Twice daily prn      ZOLPIDEM TARTRATE PO Take 10 mg by mouth daily at bedtime             No discharge procedures on file      PDMP Review     None          ED Provider  Electronically Signed by           Sanford Lantigua PA-C  01/31/22 2045

## 2022-02-01 ENCOUNTER — ANESTHESIA (INPATIENT)
Dept: PERIOP | Facility: HOSPITAL | Age: 77
DRG: 388 | End: 2022-02-01
Payer: MEDICARE

## 2022-02-01 ENCOUNTER — APPOINTMENT (INPATIENT)
Dept: PERIOP | Facility: HOSPITAL | Age: 77
DRG: 388 | End: 2022-02-01
Payer: MEDICARE

## 2022-02-01 ENCOUNTER — ANESTHESIA EVENT (INPATIENT)
Dept: PERIOP | Facility: HOSPITAL | Age: 77
DRG: 388 | End: 2022-02-01
Payer: MEDICARE

## 2022-02-01 LAB
ALBUMIN SERPL BCP-MCNC: 3.4 G/DL (ref 3.5–5)
ALP SERPL-CCNC: 69 U/L (ref 34–104)
ALT SERPL W P-5'-P-CCNC: <3 U/L (ref 7–52)
ANION GAP SERPL CALCULATED.3IONS-SCNC: 6 MMOL/L (ref 4–13)
AST SERPL W P-5'-P-CCNC: 15 U/L (ref 13–39)
ATRIAL RATE: 64 BPM
BASOPHILS # BLD MANUAL: 0 THOUSAND/UL (ref 0–0.1)
BASOPHILS NFR MAR MANUAL: 0 % (ref 0–1)
BILIRUB SERPL-MCNC: 0.22 MG/DL (ref 0.2–1)
BUN SERPL-MCNC: 40 MG/DL (ref 5–25)
CALCIUM ALBUM COR SERPL-MCNC: 9.6 MG/DL (ref 8.3–10.1)
CALCIUM SERPL-MCNC: 9.1 MG/DL (ref 8.4–10.2)
CHLORIDE SERPL-SCNC: 99 MMOL/L (ref 96–108)
CO2 SERPL-SCNC: 28 MMOL/L (ref 21–32)
CREAT SERPL-MCNC: 0.73 MG/DL (ref 0.6–1.3)
CRP SERPL QL: 30.3 MG/L
EOSINOPHIL # BLD MANUAL: 0 THOUSAND/UL (ref 0–0.4)
EOSINOPHIL NFR BLD MANUAL: 0 % (ref 0–6)
ERYTHROCYTE [DISTWIDTH] IN BLOOD BY AUTOMATED COUNT: 14.6 % (ref 11.6–15.1)
GFR SERPL CREATININE-BSD FRML MDRD: 80 ML/MIN/1.73SQ M
GLUCOSE P FAST SERPL-MCNC: 103 MG/DL (ref 65–99)
GLUCOSE SERPL-MCNC: 103 MG/DL (ref 65–140)
HCT VFR BLD AUTO: 35.8 % (ref 34.8–46.1)
HGB BLD-MCNC: 11.7 G/DL (ref 11.5–15.4)
LIPASE SERPL-CCNC: 12 U/L (ref 11–82)
LYMPHOCYTES # BLD AUTO: 1.54 THOUSAND/UL (ref 0.6–4.47)
LYMPHOCYTES # BLD AUTO: 22 % (ref 14–44)
MAGNESIUM SERPL-MCNC: 1.9 MG/DL (ref 1.9–2.7)
MCH RBC QN AUTO: 28 PG (ref 26.8–34.3)
MCHC RBC AUTO-ENTMCNC: 32.7 G/DL (ref 31.4–37.4)
MCV RBC AUTO: 86 FL (ref 82–98)
MONOCYTES # BLD AUTO: 0.84 THOUSAND/UL (ref 0–1.22)
MONOCYTES NFR BLD: 12 % (ref 4–12)
NEUTROPHILS # BLD MANUAL: 4.62 THOUSAND/UL (ref 1.85–7.62)
NEUTS BAND NFR BLD MANUAL: 3 % (ref 0–8)
NEUTS SEG NFR BLD AUTO: 63 % (ref 43–75)
OSMOLALITY UR: 652 MMOL/KG
P AXIS: 38 DEGREES
PHOSPHATE SERPL-MCNC: 3.1 MG/DL (ref 2.3–4.1)
PLATELET # BLD AUTO: 223 THOUSANDS/UL (ref 149–390)
PLATELET BLD QL SMEAR: ADEQUATE
PMV BLD AUTO: 9.7 FL (ref 8.9–12.7)
POTASSIUM SERPL-SCNC: 4.4 MMOL/L (ref 3.5–5.3)
PR INTERVAL: 140 MS
PROCALCITONIN SERPL-MCNC: <0.05 NG/ML
PROT SERPL-MCNC: 6.7 G/DL (ref 6.4–8.4)
QRS AXIS: -20 DEGREES
QRSD INTERVAL: 84 MS
QT INTERVAL: 408 MS
QTC INTERVAL: 420 MS
RBC # BLD AUTO: 4.18 MILLION/UL (ref 3.81–5.12)
RBC MORPH BLD: NORMAL
SODIUM 24H UR-SCNC: 9 MOL/L
SODIUM SERPL-SCNC: 133 MMOL/L (ref 135–147)
T WAVE AXIS: 17 DEGREES
VENTRICULAR RATE: 64 BPM
WBC # BLD AUTO: 7 THOUSAND/UL (ref 4.31–10.16)

## 2022-02-01 PROCEDURE — 85007 BL SMEAR W/DIFF WBC COUNT: CPT | Performed by: NURSE PRACTITIONER

## 2022-02-01 PROCEDURE — 86140 C-REACTIVE PROTEIN: CPT | Performed by: NURSE PRACTITIONER

## 2022-02-01 PROCEDURE — 83690 ASSAY OF LIPASE: CPT | Performed by: NURSE PRACTITIONER

## 2022-02-01 PROCEDURE — 99232 SBSQ HOSP IP/OBS MODERATE 35: CPT | Performed by: NURSE PRACTITIONER

## 2022-02-01 PROCEDURE — 88302 TISSUE EXAM BY PATHOLOGIST: CPT | Performed by: PATHOLOGY

## 2022-02-01 PROCEDURE — 97110 THERAPEUTIC EXERCISES: CPT

## 2022-02-01 PROCEDURE — 43235 EGD DIAGNOSTIC BRUSH WASH: CPT | Performed by: STUDENT IN AN ORGANIZED HEALTH CARE EDUCATION/TRAINING PROGRAM

## 2022-02-01 PROCEDURE — 87081 CULTURE SCREEN ONLY: CPT | Performed by: INTERNAL MEDICINE

## 2022-02-01 PROCEDURE — 84100 ASSAY OF PHOSPHORUS: CPT | Performed by: NURSE PRACTITIONER

## 2022-02-01 PROCEDURE — 99222 1ST HOSP IP/OBS MODERATE 55: CPT | Performed by: STUDENT IN AN ORGANIZED HEALTH CARE EDUCATION/TRAINING PROGRAM

## 2022-02-01 PROCEDURE — 93010 ELECTROCARDIOGRAM REPORT: CPT | Performed by: INTERNAL MEDICINE

## 2022-02-01 PROCEDURE — 83735 ASSAY OF MAGNESIUM: CPT | Performed by: NURSE PRACTITIONER

## 2022-02-01 PROCEDURE — 84145 PROCALCITONIN (PCT): CPT | Performed by: NURSE PRACTITIONER

## 2022-02-01 PROCEDURE — 0DJ08ZZ INSPECTION OF UPPER INTESTINAL TRACT, VIA NATURAL OR ARTIFICIAL OPENING ENDOSCOPIC: ICD-10-PCS | Performed by: STUDENT IN AN ORGANIZED HEALTH CARE EDUCATION/TRAINING PROGRAM

## 2022-02-01 PROCEDURE — 0DBN8ZX EXCISION OF SIGMOID COLON, VIA NATURAL OR ARTIFICIAL OPENING ENDOSCOPIC, DIAGNOSTIC: ICD-10-PCS | Performed by: STUDENT IN AN ORGANIZED HEALTH CARE EDUCATION/TRAINING PROGRAM

## 2022-02-01 PROCEDURE — 99222 1ST HOSP IP/OBS MODERATE 55: CPT | Performed by: PHYSICIAN ASSISTANT

## 2022-02-01 PROCEDURE — 80053 COMPREHEN METABOLIC PANEL: CPT | Performed by: NURSE PRACTITIONER

## 2022-02-01 PROCEDURE — 97163 PT EVAL HIGH COMPLEX 45 MIN: CPT

## 2022-02-01 PROCEDURE — 85027 COMPLETE CBC AUTOMATED: CPT | Performed by: NURSE PRACTITIONER

## 2022-02-01 PROCEDURE — 45331 SIGMOIDOSCOPY AND BIOPSY: CPT | Performed by: STUDENT IN AN ORGANIZED HEALTH CARE EDUCATION/TRAINING PROGRAM

## 2022-02-01 PROCEDURE — 97167 OT EVAL HIGH COMPLEX 60 MIN: CPT

## 2022-02-01 RX ORDER — POLYETHYLENE GLYCOL 3350 17 G/17G
119 POWDER, FOR SOLUTION ORAL ONCE
Status: COMPLETED | OUTPATIENT
Start: 2022-02-02 | End: 2022-02-02

## 2022-02-01 RX ORDER — ONDANSETRON 2 MG/ML
INJECTION INTRAMUSCULAR; INTRAVENOUS AS NEEDED
Status: DISCONTINUED | OUTPATIENT
Start: 2022-02-01 | End: 2022-02-01

## 2022-02-01 RX ORDER — POLYETHYLENE GLYCOL 3350 17 G/17G
119 POWDER, FOR SOLUTION ORAL ONCE
Status: COMPLETED | OUTPATIENT
Start: 2022-02-01 | End: 2022-02-01

## 2022-02-01 RX ORDER — SUCCINYLCHOLINE/SOD CL,ISO/PF 100 MG/5ML
SYRINGE (ML) INTRAVENOUS AS NEEDED
Status: DISCONTINUED | OUTPATIENT
Start: 2022-02-01 | End: 2022-02-01

## 2022-02-01 RX ORDER — ONDANSETRON 2 MG/ML
4 INJECTION INTRAMUSCULAR; INTRAVENOUS ONCE AS NEEDED
Status: DISCONTINUED | OUTPATIENT
Start: 2022-02-01 | End: 2022-02-06 | Stop reason: HOSPADM

## 2022-02-01 RX ORDER — GLYCOPYRROLATE 0.2 MG/ML
INJECTION INTRAMUSCULAR; INTRAVENOUS AS NEEDED
Status: DISCONTINUED | OUTPATIENT
Start: 2022-02-01 | End: 2022-02-01

## 2022-02-01 RX ORDER — PROPOFOL 10 MG/ML
INJECTION, EMULSION INTRAVENOUS AS NEEDED
Status: DISCONTINUED | OUTPATIENT
Start: 2022-02-01 | End: 2022-02-01

## 2022-02-01 RX ORDER — LIDOCAINE HYDROCHLORIDE 10 MG/ML
INJECTION, SOLUTION EPIDURAL; INFILTRATION; INTRACAUDAL; PERINEURAL AS NEEDED
Status: DISCONTINUED | OUTPATIENT
Start: 2022-02-01 | End: 2022-02-01

## 2022-02-01 RX ADMIN — AMLODIPINE BESYLATE 10 MG: 10 TABLET ORAL at 08:56

## 2022-02-01 RX ADMIN — GABAPENTIN 100 MG: 100 CAPSULE ORAL at 15:45

## 2022-02-01 RX ADMIN — SODIUM CHLORIDE: 0.9 INJECTION, SOLUTION INTRAVENOUS at 14:43

## 2022-02-01 RX ADMIN — PROPOFOL 150 MG: 10 INJECTION, EMULSION INTRAVENOUS at 14:15

## 2022-02-01 RX ADMIN — CARBIDOPA AND LEVODOPA 1 TABLET: 25; 100 TABLET ORAL at 08:56

## 2022-02-01 RX ADMIN — ONDANSETRON 4 MG: 2 INJECTION INTRAMUSCULAR; INTRAVENOUS at 14:27

## 2022-02-01 RX ADMIN — MECLIZINE 12.5 MG: 12.5 TABLET ORAL at 08:56

## 2022-02-01 RX ADMIN — LEVOTHYROXINE SODIUM 100 MCG: 100 TABLET ORAL at 05:53

## 2022-02-01 RX ADMIN — DIVALPROEX SODIUM 500 MG: 500 TABLET, EXTENDED RELEASE ORAL at 21:07

## 2022-02-01 RX ADMIN — ZOLPIDEM TARTRATE 5 MG: 5 TABLET ORAL at 21:07

## 2022-02-01 RX ADMIN — HEPARIN SODIUM 5000 UNITS: 5000 INJECTION INTRAVENOUS; SUBCUTANEOUS at 21:07

## 2022-02-01 RX ADMIN — REMDESIVIR 100 MG: 100 INJECTION, POWDER, LYOPHILIZED, FOR SOLUTION INTRAVENOUS at 17:28

## 2022-02-01 RX ADMIN — CARBIDOPA AND LEVODOPA 1 TABLET: 25; 100 TABLET ORAL at 20:29

## 2022-02-01 RX ADMIN — HEPARIN SODIUM 5000 UNITS: 5000 INJECTION INTRAVENOUS; SUBCUTANEOUS at 15:45

## 2022-02-01 RX ADMIN — HEPARIN SODIUM 5000 UNITS: 5000 INJECTION INTRAVENOUS; SUBCUTANEOUS at 05:53

## 2022-02-01 RX ADMIN — OLANZAPINE 7.5 MG: 5 TABLET, FILM COATED ORAL at 21:07

## 2022-02-01 RX ADMIN — PROPRANOLOL HYDROCHLORIDE 10 MG: 10 TABLET ORAL at 17:27

## 2022-02-01 RX ADMIN — CEFTRIAXONE 1000 MG: 1 INJECTION, SOLUTION INTRAVENOUS at 20:29

## 2022-02-01 RX ADMIN — POLYETHYLENE GLYCOL 3350 119 G: 17 POWDER, FOR SOLUTION ORAL at 15:46

## 2022-02-01 RX ADMIN — LIDOCAINE HYDROCHLORIDE 100 MG: 10 INJECTION, SOLUTION EPIDURAL; INFILTRATION; INTRACAUDAL; PERINEURAL at 14:15

## 2022-02-01 RX ADMIN — GABAPENTIN 100 MG: 100 CAPSULE ORAL at 08:56

## 2022-02-01 RX ADMIN — DIVALPROEX SODIUM 250 MG: 250 TABLET, DELAYED RELEASE ORAL at 08:56

## 2022-02-01 RX ADMIN — SODIUM CHLORIDE 100 ML/HR: 0.9 INJECTION, SOLUTION INTRAVENOUS at 00:20

## 2022-02-01 RX ADMIN — PROPRANOLOL HYDROCHLORIDE 10 MG: 10 TABLET ORAL at 08:56

## 2022-02-01 RX ADMIN — ATORVASTATIN CALCIUM 40 MG: 40 TABLET, FILM COATED ORAL at 08:56

## 2022-02-01 RX ADMIN — Medication 100 MG: at 14:15

## 2022-02-01 RX ADMIN — CEFTRIAXONE 1000 MG: 1 INJECTION, SOLUTION INTRAVENOUS at 00:16

## 2022-02-01 RX ADMIN — GLYCOPYRROLATE 0.2 MG: 0.4 INJECTION INTRAMUSCULAR; INTRAVENOUS at 14:23

## 2022-02-01 RX ADMIN — GABAPENTIN 100 MG: 100 CAPSULE ORAL at 20:29

## 2022-02-01 NOTE — PLAN OF CARE
Problem: PAIN - ADULT  Goal: Verbalizes/displays adequate comfort level or baseline comfort level  Description: Interventions:  - Encourage patient to monitor pain and request assistance  - Assess pain using appropriate pain scale  - Administer analgesics based on type and severity of pain and evaluate response  - Implement non-pharmacological measures as appropriate and evaluate response  - Consider cultural and social influences on pain and pain management  - Notify physician/advanced practitioner if interventions unsuccessful or patient reports new pain  Outcome: Progressing     Problem: INFECTION - ADULT  Goal: Absence or prevention of progression during hospitalization  Description: INTERVENTIONS:  - Assess and monitor for signs and symptoms of infection  - Monitor lab/diagnostic results  - Monitor all insertion sites, i e  indwelling lines, tubes, and drains  - Monitor endotracheal if appropriate and nasal secretions for changes in amount and color  - Circle appropriate cooling/warming therapies per order  - Administer medications as ordered  - Instruct and encourage patient and family to use good hand hygiene technique  - Identify and instruct in appropriate isolation precautions for identified infection/condition  Outcome: Progressing  Goal: Absence of fever/infection during neutropenic period  Description: INTERVENTIONS:  - Monitor WBC    Outcome: Progressing

## 2022-02-01 NOTE — ASSESSMENT & PLAN NOTE
· Likely due to hypovolemia hyponatremia with recent vomiting- improving with IVF   · Continue gentle IVF   · Monitor Na level

## 2022-02-01 NOTE — ASSESSMENT & PLAN NOTE
· Patient does not require any oxygen at this time  · Admitted under mild pathway  · Due to her comorbidity, started on remdesivir day #2/5  · D-dimers- 1 2- continue on heparin 5000 units t i d   · CT abdomen and pelvis- without evidence of pneumonia  · Trend inflammatory markers  · Incentive spirometry, prone/side-lying position, cough and deep breathing

## 2022-02-01 NOTE — PLAN OF CARE
Problem: PHYSICAL THERAPY ADULT  Goal: Performs mobility at highest level of function for planned discharge setting  See evaluation for individualized goals  Description: Treatment/Interventions: Functional transfer training,LE strengthening/ROM,Therapeutic exercise,Endurance training,Patient/family training,Gait training,Bed mobility,Equipment eval/education  Equipment Recommended: Sharon Amador       See flowsheet documentation for full assessment, interventions and recommendations  Note: Prognosis: Fair  Problem List: Decreased strength,Decreased endurance,Impaired balance,Decreased mobility,Pain  Assessment: Pt is a 78 y/o female admitted to [facility] on 2/1/2022 for ileus  PT was ordered for "ambulate" and "activity as tolerated" to perform a high-complexity  evaluation  The PMH for this pt is covid-19, seizure disorder, hyponatremia, hypothyroidism, HTN, parkinson's, and schizophrenia  Person factors for this pt is increased age  Pt lives in an assisted living facility with a ramped entrance who receives help from personal care attendant for ADL's and IADL's  The pt presented with decreased strength, decreased endurance, decreased mobility, and impaired balance  During PT eval, the pt performed bed mobility with mod Ax2, transfer min Ax1, and ambulation min Ax1 with a RW  Recommend pt to contiue receiving PT services during LOS at hospital to work on the deficits mentioned above  PT d/c recommendation is returning to facility  (Simultaneous filing  User may not have seen previous data )  Barriers to Discharge: None        PT Discharge Recommendation: Return to facility with rehabilitation services          See flowsheet documentation for full assessment

## 2022-02-01 NOTE — ANESTHESIA PREPROCEDURE EVALUATION
Procedure:  FLEXIBLE SIGMOIDOSCOPY  EGD    Relevant Problems   CARDIO   (+) Primary hypertension      ENDO   (+) Acquired hypothyroidism      GI/HEPATIC   (+) Ileus (HCC)      NEURO/PSYCH   (+) Other schizophrenia (HCC)   (+) Seizure disorder (HCC)        Physical Exam    Airway    Mallampati score: II  TM Distance: >3 FB  Neck ROM: full     Dental   No notable dental hx     Cardiovascular      Pulmonary      Other Findings        Anesthesia Plan  ASA Score- 3     Anesthesia Type- general with ASA Monitors  Additional Monitors:   Airway Plan: ETT  Plan Factors-Exercise tolerance (METS): >4 METS  Chart reviewed  EKG reviewed  Patient summary reviewed  Patient is not a current smoker  Induction- intravenous and rapid sequence induction  Postoperative Plan-     Informed Consent- Anesthetic plan and risks discussed with patient  I personally reviewed this patient with the CRNA  Discussed and agreed on the Anesthesia Plan with the CRNA  Farhad Gordon

## 2022-02-01 NOTE — OCCUPATIONAL THERAPY NOTE
Occupational Therapy Evaluation      Chu Fox    2/1/2022    Patient Active Problem List   Diagnosis    Other schizophrenia ([facility] )    Parkinson's disease ([facility] )    Primary hypertension    Urinary incontinence    Acquired hypothyroidism    COVID-19    [facility] ([facility]    Hyponatremia    Seizure disorder ([facility] )       Past Medical History:   Diagnosis Date    Disease of thyroid gland     GERD (gastroesophageal reflux disease)     Hypertension     Mood disorder ([facility] )     Neuropathy     Osteoporosis     Parkinson disease ([facility]     Schizophrenia ([facility] )     Seizure ([facility] )     Urinary incontinence     Vitamin D deficiency         02/01/22 0807   OT Last Visit   OT Visit Date 02/01/22   Note Type   Note type Evaluation   Additional Comments Pt agreeable to OT eval  Upon arrival pt supine in bed with HOB elevated  Restrictions/Precautions   Weight Bearing Precautions Per Order No   Other Precautions Chair Alarm; Fall Risk;Pain   Pain Assessment   Pain Assessment Tool 0-10   Pain Score 5  (0/10 at rest, 5/10 c mobility )   Pain Location/Orientation Location: Back   Pain Onset/Description Onset: Ongoing;Frequency: Intermittent; Descriptor: Aching;Descriptor: Discomfort  (c mobility)   Hospital Pain Intervention(s) Ambulation/increased activity;Repositioned   Home Living   Type of Home Assisted living  ([address] [facility])   Home Layout One level;Performs ADLs on one level; Able to live on main level with bedroom/bathroom; Ramped entrance   Bathroom Shower/Tub Walk-in shower   Bathroom Toilet Standard   Bathroom Equipment Shower chair;Grab bars around toilet   [facility]; Wheelchair-manual  (utilizes RW at baseline )   Prior Function   Level of [place] Needs assistance with ADLs and functional mobility; Needs assistance with IADLs   Lives With Facility staff   Receives Help From Personal care attendant   ADL Assistance Needs assistance  (requires assist c bathing, indep  c dressing/toileting )   IADLs Needs assistance  (facility does cooking, laundry, and med management )   Falls in the last 6 months 1 to 4  (1 fall reported )   Vocational Retired   Lifestyle   Autonomy Patient reporting being independent with most ADLs, ambulatory with RW and lives at [facility]    Reciprocal Relationships sister and facility staff    Service to Others retired    ADL   Eating Assistance 5  [address] [facility] 5  [address] [facility] 4  [address] 3  Moderate Assistance  (pt limited by decreased functional reach)   [address]  4  Minimal Assistance   Additional Comments ADL levels based on functional performance during OT eval     Bed Mobility   Supine to Sit 3  Moderate assistance   Additional items Assist x 2;HOB elevated; Bedrails; Increased time required;Verbal cues;LE management  (Pt limited by pain and "stiffness" )   Sit to Supine   (DNT: pt seated OOB in recliner at end of eval )   Additional Comments Pt denied lightheaded/dizziness with transitional movements  Transfers   Sit to Stand 4  Minimal assistance   Additional items Assist x 1;Bedrails; Increased time required;Verbal cues   Stand to Sit 4  Minimal assistance   Additional items Assist x 1; Armrests; Increased time required;Verbal cues   Additional Comments Pt completed STS transfer with RW for BUE support  Verbal cues provided for task focus and proper body mechanics  Functional Mobility   Functional Mobility 4  Minimal assistance   Additional Comments Pt ambulated in room from bed to recliner with no overt LOB  SpO2 decreased to 87% c mobility but returned to >90% once seated in recliner on RA  Occasional verbal cues provided for RW management      Additional items Rolling walker   Balance   Static Sitting Fair +   Dynamic Sitting Fair   Static Standing Fair   Dynamic Standing Fair -   Activity Tolerance   Activity Tolerance Patient limited by fatigue;Patient limited by pain   Medical Staff Made Aware Pt seen as a co-eval with PT due to the patient's co-morbidities, clinically unstable presentation, and present impairments which are a regression from the patient's baseline  Nurse Made Aware RN made aware of outcomes    RUE Assessment   RUE Assessment WFL  (grossly 3+/5 MMT)   LUE Assessment   LUE Assessment WFL  (grossly 3+/5 MMT)   Hand Function   Gross Motor Coordination Functional   Fine Motor Coordination Functional   Sensation   Light Touch No apparent deficits  (per pt report )   Vision-Basic Assessment   Current Vision Wears glasses all the time  (bifocals )   Cognition   Overall Cognitive Status WFL   Arousal/Participation Alert; Responsive; Cooperative   Attention Attends with cues to redirect   Orientation Level Oriented X4   Memory Within functional limits   Following Commands Follows one step commands without difficulty   Comments Pt verbose and requires occasional verbal cues for attention/task focus  Assessment   Limitation Decreased ADL status; Decreased UE strength;Decreased endurance;Decreased self-care trans;Decreased high-level ADLs   Prognosis Good   Assessment Patient is a 77 y o  female seen for OT evaluation s/[facility] w/[facility])  Commorbidities affecting patient's functional performance at time of assessment include: hypothyroidism, COVID-19, hyponatremia, schizophrenia, parkinson's disease, HTN and seizure disorder  Orders placed for OT evaluation and treatment and up with assistance  Performed at least two patient identifiers during session including name and wristband  Prior to admission, Patient reporting being independent with most ADLs, ambulatory with RW and lives at [facility]   Personal factors affecting patient at time of initial evaluation include: difficulty performing ADLs and difficulty performing IADLs  Upon evaluation, patient requires minimal  assist for UB ADLs, minimal  and moderate assist for LB ADLs, transfers and functional ambulation in room and bathroom with minimal  assist, with the use of Rolling Walker  Patient is oriented x 4  Occupational performance is affected by the following deficits: decreased functional reach, decreased muscle strength, dynamic sit/ stand balance deficit with poor standing tolerance time for self care and functional mobility, decreased activity tolerance, increased pain and delayed righting and equilibrium reactions  Based on the mentioned OT evaluation outcomes, functional performance deficits, and assessment findings, pt has been identified as a high complexity evaluation  Patient to benefit from continued Occupational Therapy treatment while in the hospital to address deficits as defined above and maximize level of functional independence with ADLs and functional mobility  Occupational Performance areas to address include: grooming , bathing/ shower, dressing, toilet hygiene, transfer to all surfaces, functional ambulation, Leisure Participation and Social participation  From OT standpoint, recommendation at time of d/c would be Return to facility with rehabilitation services  Goals   Patient Goals to go back to [facility]    Plan   Treatment Interventions ADL retraining;Functional transfer training;UE strengthening/ROM; Endurance training;Patient/family training;Equipment evaluation/education; Compensatory technique education; Activityengagement; Energy conservation   Goal Expiration Date 02/11/22   OT Treatment Day 0   OT Frequency 3-5x/wk   Recommendation   OT Discharge Recommendation Return to facility with rehabilitation services   OT - OK to Discharge Yes  (Once medically cleared )   Additional Comments  At end of eval, pt seated OOB in recliner with all needs met and PT Neyda present    Additional Comments 2 The patient's raw score on the AM-PAC Daily Activity inpatient short form is 17, standardized score is 37 26, less than 39 4  Patients at this level are likely to benefit from discharge to post-acute rehabilitation services  Please refer to the recommendation of the Occupational Therapist for safe discharge planning  AM-PAC Daily Activity Inpatient   Lower Body Dressing 2   Bathing 3   Toileting 3   Upper Body Dressing 3   Grooming 3   Eating 3   Daily Activity Raw Score 17   Daily Activity Standardized Score (Calc for Raw Score >=11) 37 26   AM-PAC Applied Cognition Inpatient   Following a Speech/Presentation 4   Understanding Ordinary Conversation 4   Taking Medications 3   Remembering Where Things Are Placed or Put Away 2   Remembering List of 4-5 Errands 2   Taking Care of Complicated Tasks 3   Applied Cognition Raw Score 18   Applied Cognition Standardized Score 38 07     GOALS:    *ADL transfers with (I) for inc'd independence with ADLs/purposeful tasks    *UB ADL with (I) for inc'd independence with self cares    *LB ADL with (S) using AE prn for inc'd independence with self cares    *Toileting with (I) for clothing management and hygiene for return to [facility] with personal care    *Increase stand tolerance x5 m for inc'd tolerance with standing purposeful tasks    *Participate in 10m UE therex to increase overall stamina/activity tolerance for purposeful tasks    *Bed mobility- (I) for inc'd independence to manage own comfort and initiate EOB & OOB purposeful tasks    *Patient will verbalize 3 safety awareness/ principles to prevent falls in the home setting  *Patient will verbalize and demonstrate use of energy conservation/deep breathing techniques and work simplification skills during functional activities with no verbal cues  *Patient will increase OOB/sitting tolerance to 2-4 hours per day to increase participation in self-care and leisure tasks with no s/s of exertion        *Pt will demonstrate use of long handled AE during 100% of tx sessions for increased ADL safety and independence following D/C     Nicolas Zane, OTD, OTR/L

## 2022-02-01 NOTE — PROGRESS NOTES
[facility] [address]  Progress Note Sharla Anger 1945, 77 y o  female MRN: [id number]  Unit/Bed#: -01 Encounter: [id number]  Primary Care Provider: No primary care provider on file  Date and time admitted to hospital: 1/31/2022  1:56 PM    * Ileus [facility])  Assessment & Plan  · As evident on CT a/p with severe distension of the colon with gas and stool with a rub transition at the proximal sigmoid  Suspected this could be related to a colonic ileus    Distal esophagus is distended with material    · Gastroenterology input appreciated-plan for an EGD and flexible sigmoidoscopy this afternoon  · Surgery evaluation for severe distension of the colon concerning for obstruction  · Keep NPO except meds  · IV fluid  · No acute abdomen on exam  · Supportive care    COVID-19  Assessment & Plan  · Patient does not require any oxygen at this time  · Admitted under mild pathway  · Due to her comorbidity, started on remdesivir day #2/5  · D-dimers- 1 2- continue on heparin 5000 units t i d   · CT abdomen and pelvis- without evidence of pneumonia  · Trend inflammatory markers  · Incentive spirometry, prone/side-lying position, cough and deep breathing    Seizure disorder (HCC)  Assessment & Plan  · Valproic acid level-60  · Continue with home regimen     Hyponatremia  Assessment & Plan  · Likely due to hypovolemia hyponatremia with recent vomiting- improving with IVF   · Continue gentle IVF   · Monitor Na level     Acquired hypothyroidism  Assessment & Plan  · TSH - 5 136  · Continue with levothyroxine    Primary hypertension  Assessment & Plan  · Monitor BP closely   · Hold off on lisinopril for now while NPO    · Continue with propranolol and amlodipine      Parkinson's disease ([facility] )  Assessment & Plan  · Continue sinemet   · PT/OT evaluation      Other schizophrenia ([facility] )  Assessment & Plan  · Continue with home regimen       VTE Prophylaxis:  Heparin    Patient Centered Rounds: I have performed bedside rounds with nursing staff today  Discussions with Specialists or Other Care Team Provider: GI and surgery   Education and Discussions with Family / Patient: patient and Alejandra-POA/guardian     Current Length of Stay: 0 day(s)    Current Patient Status: Inpatient   Certification Statement: The patient will continue to require additional inpatient hospital stay due to ileus     Discharge Plan: pending hospital course     Code Status: Level 1 - Full Code    Subjective:   Feeling okay  Denies nausea or vomiting but patient states that she has not been eating either  Denies any abdominal pain  Objective:     Vitals:   Temp (24hrs), Av 3 °F (35 7 °C), Min:96 1 °F (35 6 °C), Max:96 4 °F (35 8 °C)    Temp:  [96 1 °F (35 6 °C)-96 4 °F (35 8 °C)] 96 4 °F (35 8 °C)  HR:  [60-77] 77  Resp:  [16-22] 16  BP: (133-160)/() 133/82  SpO2:  [84 %-96 %] 91 %  Body mass index is 32 91 kg/m²  Input and Output Summary (last 24 hours): Intake/Output Summary (Last 24 hours) at 2022 1028  Last data filed at 2022 0501  Gross per 24 hour   Intake --   Output 150 ml   Net -150 ml       Physical Exam:   Physical Exam  Vitals and nursing note reviewed  Constitutional:       General: She is not in acute distress  Appearance: Normal appearance  HENT:      Head: Normocephalic and atraumatic  Right Ear: External ear normal       Left Ear: External ear normal       Nose: Nose normal  No rhinorrhea  Mouth/Throat:      Mouth: Mucous membranes are moist       Pharynx: Oropharynx is clear  Eyes:      General:         Right eye: No discharge  Left eye: No discharge  Pupils: Pupils are equal, round, and reactive to light  Cardiovascular:      Rate and Rhythm: Normal rate and regular rhythm  Pulses: Normal pulses  Heart sounds: Normal heart sounds  No murmur heard  Pulmonary:      Effort: Pulmonary effort is normal  No respiratory distress        Breath sounds: Normal breath sounds  Abdominal:      General: Bowel sounds are normal  There is distension  Palpations: Abdomen is soft  There is no mass  Tenderness: There is no abdominal tenderness  Musculoskeletal:         General: No swelling or tenderness  Normal range of motion  Cervical back: Normal range of motion and neck supple  No muscular tenderness  Skin:     General: Skin is warm and dry  Capillary Refill: Capillary refill takes less than 2 seconds  Coloration: Skin is pale  Findings: No erythema or rash  Neurological:      General: No focal deficit present  Mental Status: She is alert and oriented to person, place, and time  Mental status is at baseline  Psychiatric:         Mood and Affect: Mood normal          Behavior: Behavior normal          Thought Content: Thought content normal          Additional Data:     Labs:    Results from last 7 days   Lab Units 02/01/22  0432 01/31/22  1451 01/31/22  1451   WBC Thousand/uL 7 00   < > 8 63   HEMOGLOBIN g/dL 11 7   < > 11 1*   HEMATOCRIT % 35 8   < > 34 9   PLATELETS Thousands/uL 223   < > 227   NEUTROS PCT %  --   --  65   LYMPHS PCT %  --   --  18   LYMPHO PCT % 22  --   --    MONOS PCT %  --   --  14*   MONO PCT % 12  --   --    EOS PCT % 0  --  1    < > = values in this interval not displayed  Results from last 7 days   Lab Units 02/01/22  0432   SODIUM mmol/L 133*   POTASSIUM mmol/L 4 4   CHLORIDE mmol/L 99   CO2 mmol/L 28   BUN mg/dL 40*   CREATININE mg/dL 0 73   CALCIUM mg/dL 9 1   ALK PHOS U/L 69   ALT U/L <3*   AST U/L 15     Results from last 7 days   Lab Units 01/31/22  1451   INR  0 99               * I Have Reviewed All Lab Data Listed Above  * Additional Pertinent Lab Tests Reviewed:  [facility] [address] Admission  Reviewed    Imaging:  Imaging Reports Reviewed Today Include: CT a/p       Recent Cultures (last 7 days):           Last 24 Hours Medication List:   Current Facility-Administered Medications Medication Dose Route Frequency Provider Last Rate    acetaminophen  650 mg Oral Q6H PRN Juana Bertrand, SARMADNP      amLODIPine  10 mg Oral Daily Juana Jerzy, CRNP      atorvastatin  40 mg Oral Daily Juana Jerzy, CRNP      carbidopa-levodopa  1 tablet Oral BID Juana Jerzy, CRNP      cefTRIAXone  1,000 mg Intravenous Q24H Juana Jerzy, CRNP 1,000 mg (02/01/22 0016)    divalproex sodium  500 mg Oral HS Juanamundo Bertrand, CRNP      divalproex sodium  250 mg Oral Daily Juana Jerzy, CRNP      gabapentin  100 mg Oral TID Juana Bertrand, CRNP      heparin (porcine)  5,000 Units Subcutaneous [facility] Juana Bertrand, CRNP      levothyroxine  100 mcg Oral Early Morning Juana Bertrand, ARTHUR      meclizine  12 5 mg Oral Daily Juana Jerzy, CRNP      OLANZapine  7 5 mg Oral HS Juana Jerzy, CRNP      propranolol  10 mg Oral BID Juana Jerzy, CRNP      remdesivir  100 mg Intravenous Q24H Juana Bertrand, ARTHUR      sodium chloride  100 mL/hr Intravenous Continuous SARMAD DelarosaNP 100 mL/hr (02/01/22 0020)    zolpidem  5 mg Oral HS Juana Bertrand, ARTHUR          Today, Patient Was Seen By: ARTHUR Delarosa    ** Please Note: Dictation voice to text software may have been used in the creation of this document   **

## 2022-02-01 NOTE — ASSESSMENT & PLAN NOTE
· As evident on CT a/p with severe distension of the colon with gas and stool with a rub transition at the proximal sigmoid  Suspected this could be related to a colonic ileus    Distal esophagus is distended with material    · Gastroenterology input appreciated-plan for an EGD and flexible sigmoidoscopy this afternoon  · Surgery evaluation for severe distension of the colon concerning for obstruction  · Keep NPO except meds  · IV fluid  · No acute abdomen on exam  · Supportive care

## 2022-02-01 NOTE — CONSULTS
Consultation - [facility]  Sergio Virgilio 77 y o  female MRN: [id number]  Unit/Bed#: -01 Encounter: [id number]        Inpatient consult to gastroenterology  Consult performed by: Debbie Riggins PA-C  Consult ordered by: Bobo Cage          Reason for Consult / Principal Problem:     Ileus    ASSESSMENT AND PLAN:      Patient is a 30-year-old female with PMH significant for hypothyroidism, HTN, mood disorder and schizophrenia, Parkinson disease, and GERD who presented to the ED from her personal care home on 01/30 for generalized weakness and one episode of nonbloody/nonbilious vomiting  History also remarkable for 4 weeks of alternating bowel habits  During admission, patient was found to be positive for COVID-19, not requiring supplemental O2  CT A/P with significant findings for severe distention of the colon with gas and stool with abrupt transition of the proximal sigmoid; the visualized distal esophagus is distended with material  Labs overall unremarkable, with the exception of mild hyponatremia and an elevated CRP  UA unremarkable  Spoke with PA in the ED on 01/31 and recommended admission with tentative plan to make NPO at midnight for an EGD and flex sigmoidoscopy for tomorrow afternoon (02/01) to further evalaute colonic dilation with possible narrowing and esophageal distention  Given significant abdominal distention, without significant abdominal pain, will proceed with flex sig and EGD  Patient is NPO  Ordered two tap water enemas to be done 15 minutes apart, one hour prior to patient's procedure  Also recommended consult to surgery for colonic dilation with possible narrowing    - Continue NPO  - Scheduled for flex sigmoidoscopy/EGD this afternoon  - Ordered two tap water enemas to be done 15 minutes apart, 1 hour prior to patient's procedure  - SLIM to consult surgery     GI will continue to follow  ______________________________________________________________________    HPI: Patient is a 77 y o  female with PMH significant for hypothyroidism, HTN, mood disorder and schizophrenia, Parkinson's disease, and GERD who presented to the ED on 01/31 for generalized weakness and increased urinary frequency  Of note, patient resides in a personal care home and was brought to ED with staff  She is somewhat of a poor historian  Patient developed nausea with one episode of vomiting yesterday after lunch, described as nonbloody/nonbilious  She also endorsed decreased appetite and difficulty swallowing earlier that day  Staff noted that patient appears weaker within the last week, she typically ambulates with a walker but has been much slower than usual  There was also concern that she does not to leave her room, or even get out of bed  During discussion with patient today, she notes a 4 week history of constipation and diarrhea, which is unusual for her  Denies previous history of  difficulty swallowing  Denies BM or passing flatus since admission  Denies any further nausea/vomiting or abdominal pain  Denies unintentional weight loss  Denies all other GI-related complaints  Admits to previous colonoscopy many years prior, otherwise unremarkable per patient  During admission, patient was found to be positive for COVID-19  Not requiring supplemental O2  Furthermore, CT A/P with significant findings for severe distention of the colon with gas and stool with abrupt transition of the proximal sigmoid; while a colonic ileus could be considered a subtle obstructing process at the proximal sigmoid colon is not excluded follow-up colonoscopy is recommended; the visualized distal esophagus is distended with material; uncertain if this is related to the clinical history of vomiting; cannot exclude a distal esophageal process such as achalasia    Labs overall unremarkable, with the exception of mild hyponatremia and an elevated CRP  UA unremarkable  REVIEW OF SYSTEMS:    CONSTITUTIONAL: Denies any fever, chills, rigors, and weight loss  HEENT: No earache or tinnitus  Denies hearing loss or visual disturbances  CARDIOVASCULAR: No chest pain or palpitations  RESPIRATORY: Denies any cough, hemoptysis, shortness of breath or dyspnea on exertion  GASTROINTESTINAL: As noted in the History of Present Illness  GENITOURINARY: No problems with urination  Denies any hematuria or dysuria  NEUROLOGIC: No dizziness or vertigo, denies headaches  MUSCULOSKELETAL: Denies any muscle or joint pain  SKIN: Denies skin rashes or itching  ENDOCRINE: Denies excessive thirst  Denies intolerance to heat or cold  PSYCHOSOCIAL: Denies depression or anxiety  Denies any recent memory loss  Historical Information   Past Medical History:   Diagnosis Date    Disease of thyroid gland     GERD (gastroesophageal reflux disease)     Hypertension     Mood disorder (HCC)     Neuropathy     Osteoporosis     [facility] disease [facility] )     Schizophrenia ([facility] )     Seizure ([facility] )     Urinary incontinence     Vitamin D deficiency      History reviewed  No pertinent surgical history    Social History   Social History     Substance and Sexual Activity   Alcohol Use Never     Social History     Substance and Sexual Activity   Drug Use Never     Social History     Tobacco Use   Smoking Status Never Smoker   Smokeless Tobacco Never Used     Family History   Problem Relation Age of Onset    No Known Problems Mother     No Known Problems Father        Meds/Allergies     Medications Prior to Admission   Medication    amLODIPine (NORVASC) 10 mg tablet    aspirin (ECOTRIN LOW STRENGTH) 81 mg EC tablet    atorvastatin (LIPITOR) 40 mg tablet    carbidopa-levodopa (PARCOPA)  mg per disintegrating tablet    divalproex sodium (Depakote ER) 500 mg 24 hr tablet    Divalproex Sodium (DEPAKOTE PO)    gabapentin (NEURONTIN) 100 mg capsule    hydrOXYzine HCL (ATARAX) 25 mg tablet    levothyroxine 100 mcg tablet    lisinopril (ZESTRIL) 10 mg tablet    meclizine (ANTIVERT) 12 5 MG tablet    OLANZapine (ZyPREXA) 7 5 mg tablet    omeprazole (PriLOSEC) 20 mg delayed release capsule    oxybutynin (DITROPAN-XL) 5 mg 24 hr tablet    potassium chloride (Klor-Con) 10 mEq tablet    propranolol (INDERAL) 10 mg tablet    Skin Protectants, Misc  (MINERIN CREME EX)    ZOLPIDEM TARTRATE PO     Current Facility-Administered Medications   Medication Dose Route Frequency    acetaminophen (TYLENOL) tablet 650 mg  650 mg Oral Q6H PRN    amLODIPine (NORVASC) tablet 10 mg  10 mg Oral Daily    atorvastatin (LIPITOR) tablet 40 mg  40 mg Oral Daily    carbidopa-levodopa (SINEMET)  mg per tablet 1 tablet  1 tablet Oral BID    cefTRIAXone (ROCEPHIN) IVPB (premix in dextrose) 1,000 mg 50 mL  1,000 mg Intravenous Q24H    divalproex sodium (DEPAKOTE ER) 24 hr tablet 500 mg  500 mg Oral HS    divalproex sodium (DEPAKOTE) EC tablet 250 mg  250 mg Oral Daily    gabapentin (NEURONTIN) capsule 100 mg  100 mg Oral TID    heparin (porcine) subcutaneous injection 5,000 Units  5,000 Units Subcutaneous Q8H [address]    levothyroxine tablet 100 mcg  100 mcg Oral Early Morning    meclizine (ANTIVERT) tablet 12 5 mg  12 5 mg Oral Daily    OLANZapine (ZyPREXA) tablet 7 5 mg  7 5 mg Oral HS    propranolol (INDERAL) tablet 10 mg  10 mg Oral BID    remdesivir (Veklury) 100 mg in sodium chloride 0 9 % 270 mL IVPB  100 mg Intravenous Q24H    sodium chloride 0 9 % infusion  100 mL/hr Intravenous Continuous    zolpidem (AMBIEN) tablet 5 mg  5 mg Oral HS       Allergies   Allergen Reactions    Pineapple - Food Allergy Other (See Comments)     Unknown reaction           Objective     Blood pressure 141/91, pulse 76, temperature (!) 96 4 °F (35 8 °C), temperature source Temporal, resp  rate 19, height 5' 6" (1 676 m), weight 92 5 kg (203 lb 14 8 oz), SpO2 92 %   Body mass index is 32 91 kg/m²  Intake/Output Summary (Last 24 hours) at 2/1/2022 5697  Last data filed at 2/1/2022 0501  Gross per 24 hour   Intake --   Output 150 ml   Net -150 ml         PHYSICAL EXAM:      General Appearance:   AAOx3; Alert, cooperative, no distress   HEENT:   Normocephalic, atraumatic, anicteric  Neck:  Supple, symmetrical, trachea midline   Lungs:   Clear to auscultation bilaterally; no rales, rhonchi or wheezing; respirations unlabored    Heart[de-identified]   Regular rate and rhythm; no murmur, rub, or gallop     Abdomen:   +mild RUQ abd TTP, +tense abd distention; +hypoactive bowel sounds; no masses, no organomegaly    Genitalia:   Deferred    Rectal:   Deferred    Extremities:  No cyanosis, clubbing or edema    Pulses:  2+ and symmetric all extremities    Skin:  No jaundice, rashes, or lesions    Lymph nodes:  No palpable cervical lymphadenopathy        Lab Results:   Admission on 01/31/2022   Component Date Value    Color, UA 01/31/2022 Yellow     Clarity, UA 01/31/2022 Slightly Cloudy*    Specific Gravity, UA 01/31/2022 >=1 030*    pH, UA 01/31/2022 5 5     Leukocytes, UA 01/31/2022 Negative     Nitrite, UA 01/31/2022 Negative     Protein, UA 01/31/2022 Negative     Glucose, UA 01/31/2022 Negative     Ketones, UA 01/31/2022 Negative     Urobilinogen, UA 01/31/2022 0 2     Bilirubin, UA 01/31/2022 Negative     Blood, UA 01/31/2022 Negative     WBC 01/31/2022 8 63     RBC 01/31/2022 4 00     Hemoglobin 01/31/2022 11 1*    Hematocrit 01/31/2022 34 9     MCV 01/31/2022 87     MCH 01/31/2022 27 8     MCHC 01/31/2022 31 8     RDW 01/31/2022 14 7     MPV 01/31/2022 9 4     Platelets 33/68/3094 227     nRBC 01/31/2022 0     Neutrophils Relative 01/31/2022 65     Immat GRANS % 01/31/2022 2     Lymphocytes Relative 01/31/2022 18     Monocytes Relative 01/31/2022 14*    Eosinophils Relative 01/31/2022 1     Basophils Relative 01/31/2022 0     Neutrophils Absolute 01/31/2022 5 65     Immature Grans Absolute 01/31/2022 0 16     Lymphocytes Absolute 01/31/2022 1 52     Monocytes Absolute 01/31/2022 1 19     Eosinophils Absolute 01/31/2022 0 08     Basophils Absolute 01/31/2022 0 03     Sodium 01/31/2022 130*    Potassium 01/31/2022 4 8     Chloride 01/31/2022 95*    CO2 01/31/2022 29     ANION GAP 01/31/2022 6     BUN 01/31/2022 49*    Creatinine 01/31/2022 0 92     Glucose 01/31/2022 122     Calcium 01/31/2022 9 4     AST 01/31/2022 16     ALT 01/31/2022 5*    Alkaline Phosphatase 01/31/2022 70     Total Protein 01/31/2022 7 2     Albumin 01/31/2022 3 7     Total Bilirubin 01/31/2022 0 28     eGFR 01/31/2022 61     Protime 01/31/2022 13 0     INR 01/31/2022 0 99     PTT 01/31/2022 34     hs TnI 0hr 01/31/2022 5     SARS-CoV-2 01/31/2022 Positive*    INFLUENZA A PCR 01/31/2022 Negative     INFLUENZA B PCR 01/31/2022 Negative     RSV PCR 01/31/2022 Negative     Valproic Acid, Total 01/31/2022 60     TSH 3RD GENERATON 01/31/2022 5  136     Procalcitonin 01/31/2022 <0 05     CRP 01/31/2022 34 7*    D-Dimer, Quant 01/31/2022 1 20*    NT-proBNP 01/31/2022 253     Total CK 01/31/2022 104     LACTIC ACID 01/31/2022 0 5     Procalcitonin 02/01/2022 <0 05     Sodium 02/01/2022 133*    Potassium 02/01/2022 4 4     Chloride 02/01/2022 99     CO2 02/01/2022 28     ANION GAP 02/01/2022 6     BUN 02/01/2022 40*    Creatinine 02/01/2022 0 73     Glucose 02/01/2022 103     Glucose, Fasting 02/01/2022 103*    Calcium 02/01/2022 9 1     Corrected Calcium 02/01/2022 9 6     AST 02/01/2022 15     ALT 02/01/2022 <3*    Alkaline Phosphatase 02/01/2022 69     Total Protein 02/01/2022 6 7     Albumin 02/01/2022 3 4*    Total Bilirubin 02/01/2022 0 22     eGFR 02/01/2022 80     Magnesium 02/01/2022 1 9     Phosphorus 02/01/2022 3 1     WBC 02/01/2022 7 00     RBC 02/01/2022 4 18     Hemoglobin 02/01/2022 11 7     Hematocrit 02/01/2022 35 8     MCV 02/01/2022 86  MCH 02/01/2022 28 0     MCHC 02/01/2022 32 7     RDW 02/01/2022 14 6     MPV 02/01/2022 9 7     Platelets 96/18/8470 223     Lipase 02/01/2022 12     CRP 02/01/2022 30 3*    Segmented % 02/01/2022 63     Bands % 02/01/2022 3     Lymphocytes % 02/01/2022 22     Monocytes % 02/01/2022 12     Eosinophils, % 02/01/2022 0     Basophils % 02/01/2022 0     Absolute Neutrophils 02/01/2022 4 62     Lymphocytes Absolute 02/01/2022 1 54     Monocytes Absolute 02/01/2022 0 84     Eosinophils Absolute 02/01/2022 0 00     Basophils Absolute 02/01/2022 0 00     RBC Morphology 02/01/2022 Normal     Platelet Estimate 09/49/5232 Adequate        Imaging Studies: I have personally reviewed pertinent imaging studies

## 2022-02-01 NOTE — CASE MANAGEMENT
Case Management Assessment & Discharge Planning Note    Patient name Delilah Dumont  Location [address] [facility] [address] 206/-51 MRN [id number]  : 1945 Date 2022       Current Admission Date: 2022  Current Admission Diagnosis:Ileus [facility])   Patient Active Problem List    Diagnosis Date Noted    Other schizophrenia ([facility] ) 2022    Parkinson's disease ([facility] ) 2022    Primary hypertension 2022    Urinary incontinence 2022    Acquired hypothyroidism 2022    COVID-19 2022    Ileus ([facility] ) 2022    Hyponatremia 2022    Seizure disorder ([place] [address] ) 2022      LOS (days): 0  Geometric Mean LOS (GMLOS) (days): 4 70  Days to GMLOS:4 5     OBJECTIVE:    Risk of Unplanned Readmission Score: 16         Current admission status: Inpatient  Referral Reason:  (Discharge planning)    Preferred Pharmacy: No Pharmacies Listed  Primary Care Provider: No primary care provider on file  Primary Insurance: MEDICARE  Secondary Insurance[place]    ASSESSMENT:  Active Health Care Agents    There are no active Health Care Agents on file  Readmission Root Cause  30 Day Readmission: No    Patient Information  Admitted from[de-identified] Facility ([facility])  Mental Status: Alert  During Assessment patient was accompanied by: Not accompanied during assessment  Assessment information provided by[de-identified] Patient  Primary Caregiver: Other (Comment)  Caregiver's Name[de-identified] [employer] Telephone Number[de-identified] [phone]  Support Systems: [facility]: [address] [place] [address] do you live in?: [address] [place] [address] entry access options   Select all that apply : No steps to enter home  Type of Current Residence: Facility  Upon entering residence, is there a bedroom on the main floor (no further steps)?: No  A bedroom is located on the following floor levels of residence (select all that apply):: 2nd Floor  Upon entering residence, is there a bathroom on the main floor (no further steps)?: No  Indicate which floors of current residence have a bathroom (select all the apply):: 2nd Floor  Number of steps to 2nd floor from main floor: One Flight  In the last 12 months, was there a time when you were not able to pay the mortgage or rent on time?: No  In the last 12 months, how many places have you lived?: 1  In the last 12 months, was there a time when you did not have a steady place to sleep or slept in a shelter (including now)?: No  Homeless/housing insecurity resource given?: N/A  Living Arrangements: Other (Comment) ([facility])    Activities of Daily Living Prior to Admission  Functional Status: Assistance  Completes ADLs independently?: No  Level of ADL dependence: Assistance  Ambulates independently?: Yes  Does patient use assisted devices?: Yes  Assisted Devices (DME) used: Tony Jiang  Does patient currently own DME?: Yes  What DME does the patient currently own?: Suleman Coronel  Does patient have a history of Outpatient Therapy (PT/OT)?: No  Does the patient have a history of Short-Term Rehab?: Yes ([facility])  Does patient have a history of HHC?: Yes ([facility])    Patient Information Continued  Income Source: SSI/SSD  Does patient have prescription coverage?: Yes  Within the past 12 months, you worried that your food would run out before you got the money to buy more : Never true  Within the past 12 months, the food you bought just didnt last and you didnt have money to get more : Never true  Food insecurity resource given?: N/A  Does patient receive dialysis treatments?: No  Does patient have a history of substance abuse?: No  Does patient have a history of Mental Health Diagnosis?: Yes  Is patient receiving treatment for mental health?: Yes (PCP perscribes medications)    PHQ 2/9 Screening   Reviewed PHQ 2/9 Depression Screening Score?: No    Means of Transportation  Means of Transport to Appts[de-identified] Friends  In the past 12 months, has lack of transportation kept you from medical appointments or from getting medications?: No  In the past 12 months, has lack of transportation kept you from meetings, work, or from getting things needed for daily living?: No  Was application for public transport provided?: N/A    DISCHARGE DETAILS:    Discharge planning discussed with[de-identified] Pt and POA  [place] of Choice: Yes  Comments - Freedom of Choice: Pt had [address] in the past and would like them again    Contacts  Patient Contacts: Alicia KRISHNAN  Relationship to Patient[de-identified] Friend  Contact Method: Phone  Phone Number: [phone]  Reason/Outcome: Discharge [address]         Is the patient interested in [facility] at discharge?: Yes  [address] requested[de-identified] Physical [address] Name[de-identified] Other ([address])  [address] [facility] Provider[de-identified] PCP  [address] Needed[de-identified] Strengthening/Theraputic Exercises to Improve Function,Evaluate Functional Status and Safety  Homebound Criteria Met[de-identified] Uses an Assist Device (i e  cane, walker, etc),Requires the Assistance of Another Person for Safe Ambulation or to Leave the Home,Psychological Issues  Supporting Clincal Findings[de-identified] Fatigues Easliy in Short Distances,Limited Endurance    DME Referral Provided  Referral made for DME?: No    Treatment Team [facility]ation[facility]  Discharge Destination Plan[de-identified] Home with Gwen at Discharge : [address] to [address] who confirmed she is her POA and Rep Payee  Pt [facility] a [facility] of [facility]  H[facility] services in the past, however, does not know the name of same  TC to shelter, spoke to Shree Alvarez who reports the pt had [address]  A referral was made for same  shelter reports the pt will need to go into an isolation room and they need to know the day before discharge  shelter also requesting another COVID test   Notified SLIM of same

## 2022-02-01 NOTE — CONSULTS
Consultation - General Surgery   Rashmi Geronimo 77 y o  female MRN: [id number]  Unit/Bed#: -01 Encounter: [id number]    Assessment/Plan     Assessment:  76yo F presenting w/ colonic ileus  -patient initially presente from personal care home after 1 episode of nonbloody, nonbilious emesis and generalized weakness  - afebrile, VSS  - no leukocytosis, WBC 7 0  -  01/31 CT AP: "Severe distention of the colon with gas and stool with abrupt transition at the proximal sigmoid  While a colonic ileus can be considered, a subtle obstructing process at the proximal sigmoid colon is not excluded and follow-up colonoscopy is recommended  "  - denies abdominal pain, no further complaints of nausea or vomiting  - not passing flatus, no BM  - on exam patient is distended but abdomen soft, tender to palpation in our RLQ, LLQ    COVID 19 +   - tested positive on 01/31/2022  PMH:  Hypothyroidism, schizophrenia, Parkinson's disease, GERD    Plan:  - continue NPO in anticipation of flex sigmoidoscopy/EGD with GI this afternoon  - if patient becomes nauseated or vomits she may require NGT insertion for decompression   - serial abdominal exams  - PRN analgesia / antiemetics   - medical management per primary team     Discussed with Dr Huynh Amen     History of Present Illness   HPI:  Rashmi Geronimo is a 77 y o  female with PMH of hypothyroid, schizophrenia, Parkinson's disease, GERD who presents from personal care home after 1 episode of nonbloody, nonbilious emesis and generalized weakness  CT AP was obtained and had significant findings for distention of colon with gas and stool with transition point in the proximal sigmoid colon  Patient was seen by GI who recommended flex sigmoidoscopy/EGD to be performed today  Patient states she is not having any abdominal pain  States she is constipated, last bowel movement 3-4 days ago, not passing flatus since admission  Denies unintentional weight loss  Denies blood in stool  Inpatient consult to Acute Care Surgery  Consult performed by: Brennon Tristan PA-C  Consult ordered by: ARTHUR Simmons        Review of Systems   Constitutional: Negative for appetite change, chills and fever  HENT: Negative for congestion  Respiratory: Negative for chest tightness and shortness of breath  Cardiovascular: Negative for chest pain, palpitations and leg swelling  Gastrointestinal: Positive for abdominal distention  Negative for abdominal pain, diarrhea, nausea and vomiting  Genitourinary: Negative for difficulty urinating and hematuria  Musculoskeletal: Negative for back pain and myalgias  Skin: Negative for rash and wound  Neurological: Negative for dizziness and weakness  Psychiatric/Behavioral: Negative for confusion  All other ROS negative unless stated above     Historical Information   Past Medical History:   Diagnosis Date    Disease of thyroid gland     GERD (gastroesophageal reflux disease)     Hypertension     Mood disorder (HCC)     Neuropathy     Osteoporosis     Parkinson disease ([facility] )     Schizophrenia ([facility] )     Seizure ([facility] )     Urinary incontinence     Vitamin D deficiency      History reviewed  No pertinent surgical history    Social History   Social History     Substance and Sexual Activity   Alcohol Use Never     Social History     Substance and Sexual Activity   Drug Use Never     E-Cigarette/Vaping    E-Cigarette Use Never User      E-Cigarette/Vaping Substances     Social History     Tobacco Use   Smoking Status Never Smoker   Smokeless Tobacco Never Used     Family History: non-contributory    Meds/Allergies   all current active meds have been reviewed  Allergies   Allergen Reactions    Pineapple - Food Allergy Other (See Comments)     Unknown reaction       Objective   First Vitals:   Blood Pressure: (!) 140/101 (01/31/22 1410)  Pulse: 60 (01/31/22 1410)  Temperature: (!) 96 1 °F (35 6 °C) (01/31/22 1410)  Temp Source: Temporal (01/31/22 2019)  Respirations: 20 (01/31/22 1410)  Height: 5' 6" (167 6 cm) (01/31/22 2019)  Weight - Scale: 92 5 kg (203 lb 14 8 oz) (01/31/22 2019)  SpO2: 96 % (01/31/22 1410)    Current Vitals:   Blood Pressure: 133/82 (02/01/22 0854)  Pulse: 77 (02/01/22 0854)  Temperature: (!) 96 4 °F (35 8 °C) (01/31/22 2019)  Temp Source: Temporal (01/31/22 2019)  Respirations: 16 (02/01/22 0854)  Height: 5' 6" (167 6 cm) (01/31/22 2019)  Weight - Scale: 92 5 kg (203 lb 14 8 oz) (01/31/22 2019)  SpO2: 91 % (02/01/22 0854)      Intake/Output Summary (Last 24 hours) at 2/1/2022 1133  Last data filed at 2/1/2022 0501  Gross per 24 hour   Intake --   Output 150 ml   Net -150 ml       Invasive Devices  Report    Peripheral Intravenous Line            Peripheral IV 02/01/22 Proximal;Right;Ventral (anterior) Forearm <1 day          Drain            External Urinary Catheter <1 day                Physical Exam  Vitals and nursing note reviewed  Constitutional:       General: She is awake  She is not in acute distress  Appearance: She is not ill-appearing or toxic-appearing  HENT:      Head: Normocephalic and atraumatic  Nose: Nose normal  No congestion  Mouth/Throat:      Mouth: Mucous membranes are moist       Pharynx: No oropharyngeal exudate  Eyes:      General: No scleral icterus  Pupils: Pupils are equal, round, and reactive to light  Cardiovascular:      Rate and Rhythm: Normal rate and regular rhythm  Pulses: Normal pulses  Heart sounds: Normal heart sounds  No murmur heard  Pulmonary:      Effort: No respiratory distress  Breath sounds: Normal breath sounds  No wheezing, rhonchi or rales  Abdominal:      General: There is distension  Palpations: Abdomen is soft  Tenderness: There is abdominal tenderness (mild in RLQ, LLQ)  There is no guarding or rebound  Hernia: No hernia is present  Musculoskeletal:         General: No tenderness  Normal range of motion  Cervical back: Normal range of motion and neck supple  No tenderness  Right lower leg: No edema  Left lower leg: No edema  Skin:     General: Skin is warm and dry  Capillary Refill: Capillary refill takes less than 2 seconds  Coloration: Skin is pale  Neurological:      General: No focal deficit present  Mental Status: She is alert and oriented to person, place, and time  Psychiatric:         Mood and Affect: Mood normal          Behavior: Behavior normal  Behavior is cooperative  Lab Results:   I have personally reviewed pertinent lab results  , CBC:   Lab Results   Component Value Date    WBC 7 00 02/01/2022    HGB 11 7 02/01/2022    HCT 35 8 02/01/2022    MCV 86 02/01/2022     02/01/2022    MCH 28 0 02/01/2022    MCHC 32 7 02/01/2022    RDW 14 6 02/01/2022    MPV 9 7 02/01/2022    NRBC 0 01/31/2022   , CMP:   Lab Results   Component Value Date    SODIUM 133 (L) 02/01/2022    K 4 4 02/01/2022    CL 99 02/01/2022    CO2 28 02/01/2022    BUN 40 (H) 02/01/2022    CREATININE 0 73 02/01/2022    CALCIUM 9 1 02/01/2022    AST 15 02/01/2022    ALT <3 (L) 02/01/2022    ALKPHOS 69 02/01/2022    EGFR 80 02/01/2022   , Coagulation:   Lab Results   Component Value Date    INR 0 99 01/31/2022     Imaging: I have personally reviewed pertinent reports  01/31/2022 CTAP: 1  Severe distention of the colon with gas and stool with abrupt transition at the proximal sigmoid  While a colonic ileus can be considered, a subtle obstructing process at the proximal sigmoid colon is not excluded and follow-up colonoscopy is   recommended  2   The visualized distal esophagus is distended with material   Uncertain if this is related to the clinical history of vomiting  Cannot exclude a distal esophageal process such as achalasia  EKG, Pathology, and Other Studies: I have personally reviewed pertinent reports        Counseling / Coordination of Care  Total floor / unit time spent today 45 minutes  Greater than 50% of total time was spent with the patient and / or family counseling and / or coordination of care        Lilian Jacobs PA-C  02/01/22

## 2022-02-01 NOTE — PLAN OF CARE
Problem: OCCUPATIONAL THERAPY ADULT  Goal: Performs self-care activities at highest level of function for planned discharge setting  See evaluation for individualized goals  Description: Treatment Interventions: ADL retraining,Functional transfer training,UE strengthening/ROM,Endurance training,Patient/family training,Equipment evaluation/education,Compensatory technique education,Activityengagement,Energy conservation          See flowsheet documentation for full assessment, interventions and recommendations  Note: Limitation: Decreased ADL status,Decreased UE strength,Decreased endurance,Decreased self-care trans,Decreased high-level ADLs  Prognosis: Good  Assessment: Patient is a 77 y o  female seen for OT evaluation s/p admit to [facility] on 1/31/2022 w/[facility]  Commorbidities affecting patient's functional performance at time of assessment include: hypothyroidism, COVID-19, hyponatremia, schizophrenia, parkinson's disease, HTN and seizure disorder  Orders placed for OT evaluation and treatment and up with assistance  Performed at least two patient identifiers during session including name and wristband  Prior to admission, Patient reporting being independent with most ADLs, ambulatory with RW and lives at [facility]  Personal factors affecting patient at time of initial evaluation include: difficulty performing ADLs and difficulty performing IADLs  Upon evaluation, patient requires minimal  assist for UB ADLs, minimal  and moderate assist for LB ADLs, transfers and functional ambulation in room and bathroom with minimal  assist, with the use of Rolling Walker  Patient is oriented x 4    Occupational performance is affected by the following deficits: decreased functional reach, decreased muscle strength, dynamic sit/ stand balance deficit with poor standing tolerance time for self care and functional mobility, decreased activity tolerance, increased pain and delayed righting and equilibrium reactions  Based on the mentioned OT evaluation outcomes, functional performance deficits, and assessment findings, pt has been identified as a high complexity evaluation  Patient to benefit from continued Occupational Therapy treatment while in the hospital to address deficits as defined above and maximize level of functional independence with ADLs and functional mobility  Occupational Performance areas to address include: grooming , bathing/ shower, dressing, toilet hygiene, transfer to all surfaces, functional ambulation, Leisure Participation and Social participation  From OT standpoint, recommendation at time of d/c would be Return to facility with rehabilitation services         OT Discharge Recommendation: Return to facility with rehabilitation services  OT - OK to Discharge: Yes (Once medically cleared )     Jose Batista OT

## 2022-02-01 NOTE — PLAN OF CARE
Problem: MOBILITY - ADULT  Goal: Maintain or return to baseline ADL function  Description: INTERVENTIONS:  -  Assess patient's ability to carry out ADLs; assess patient's baseline for ADL function and identify physical deficits which impact ability to perform ADLs (bathing, care of mouth/teeth, toileting, grooming, dressing, etc )  - Assess/evaluate cause of self-care deficits   - Assess range of motion  - Assess patient's mobility; develop plan if impaired  - Assess patient's need for assistive devices and provide as appropriate  - Encourage maximum independence but intervene and supervise when necessary  - Involve family in performance of ADLs  - Assess for home care needs following discharge   - Consider OT consult to assist with ADL evaluation and planning for discharge  - Provide patient education as appropriate  Outcome: Progressing  Goal: Maintains/Returns to pre admission functional level  Description: INTERVENTIONS:  - Perform BMAT or MOVE assessment daily    - Set and communicate daily mobility goal to care team and patient/family/caregiver  - Collaborate with rehabilitation services on mobility goals if consulted  - Perform Range of Motion 3 times a day  - Reposition patient every 2 hours    - Dangle patient 2 times a day  - Stand patient 2 times a day  - Ambulate patient 2 times a day  - Out of bed to chair 2 times a day   - Out of bed for meals 2 times a day  - Out of bed for toileting  - Record patient progress and toleration of activity level   Outcome: Progressing     Problem: Prexisting or High Potential for Compromised Skin Integrity  Goal: Skin integrity is maintained or improved  Description: INTERVENTIONS:  - Identify patients at risk for skin breakdown  - Assess and monitor skin integrity  - Assess and monitor nutrition and hydration status  - Monitor labs   - Assess for incontinence   - Turn and reposition patient  - Assist with mobility/ambulation  - Relieve pressure over bony prominences  - Avoid friction and shearing  - Provide appropriate hygiene as needed including keeping skin clean and dry  - Evaluate need for skin moisturizer/barrier cream  - Collaborate with interdisciplinary team   - Patient/family teaching  - Consider wound care consult   Outcome: Progressing     Problem: PAIN - ADULT  Goal: Verbalizes/displays adequate comfort level or baseline comfort level  Description: Interventions:  - Encourage patient to monitor pain and request assistance  - Assess pain using appropriate pain scale  - Administer analgesics based on type and severity of pain and evaluate response  - Implement non-pharmacological measures as appropriate and evaluate response  - Consider cultural and social influences on pain and pain management  - Notify physician/advanced practitioner if interventions unsuccessful or patient reports new pain  Outcome: Progressing     Problem: INFECTION - ADULT  Goal: Absence or prevention of progression during hospitalization  Description: INTERVENTIONS:  - Assess and monitor for signs and symptoms of infection  - Monitor lab/diagnostic results  - Monitor all insertion sites, i e  indwelling lines, tubes, and drains  - Monitor endotracheal if appropriate and nasal secretions for changes in amount and color  - [place] appropriate cooling/warming therapies per order  - Administer medications as ordered  - Instruct and encourage patient and family to use good hand hygiene technique  - Identify and instruct in appropriate isolation precautions for identified infection/condition  Outcome: Progressing  Goal: Absence of fever/infection during neutropenic period  Description: INTERVENTIONS:  - Monitor WBC    Outcome: Progressing     Problem: SAFETY ADULT  Goal: Patient will remain free of falls  Description: INTERVENTIONS:  - Educate patient/family on patient safety including physical limitations  - Instruct patient to call for assistance with activity   - Consult OT/PT to assist with strengthening/mobility   - Keep Call bell within reach  - Keep bed low and locked with side rails adjusted as appropriate  - Keep care items and personal belongings within reach  - Initiate and maintain comfort rounds  - Make Fall Risk Sign visible to staff  - Offer Toileting every 2 Hours, in advance of need  - Initiate/Maintain bed alarm  - Obtain necessary fall risk management equipment  - Apply yellow socks and bracelet for high fall risk patients  - Consider moving patient to room near nurses station  Outcome: Progressing     Problem: DISCHARGE PLANNING  Goal: Discharge to home or other facility with appropriate resources  Description: INTERVENTIONS:  - Identify barriers to discharge w/patient and caregiver  - Arrange for needed discharge resources and transportation as appropriate  - Identify discharge learning needs (meds, wound care, etc )  - Arrange for interpretive services to assist at discharge as needed  - Refer to Case Management Department for coordinating discharge planning if the patient needs post-hospital services based on physician/advanced practitioner order or complex needs related to functional status, cognitive ability, or social support system  Outcome: Progressing     Problem: Knowledge Deficit  Goal: Patient/family/caregiver demonstrates understanding of disease process, treatment plan, medications, and discharge instructions  Description: Complete learning assessment and assess knowledge base    Interventions:  - Provide teaching at level of understanding  - Provide teaching via preferred learning methods  Outcome: Progressing

## 2022-02-02 PROBLEM — E16.2 HYPOGLYCEMIA: Status: ACTIVE | Noted: 2022-02-02

## 2022-02-02 LAB
GLUCOSE SERPL-MCNC: 104 MG/DL (ref 65–140)
GLUCOSE SERPL-MCNC: 192 MG/DL (ref 65–140)
GLUCOSE SERPL-MCNC: 48 MG/DL (ref 65–140)
GLUCOSE SERPL-MCNC: 91 MG/DL (ref 65–140)
MRSA NOSE QL CULT: ABNORMAL
MRSA NOSE QL CULT: ABNORMAL

## 2022-02-02 PROCEDURE — 97116 GAIT TRAINING THERAPY: CPT

## 2022-02-02 PROCEDURE — 97530 THERAPEUTIC ACTIVITIES: CPT

## 2022-02-02 PROCEDURE — 99232 SBSQ HOSP IP/OBS MODERATE 35: CPT | Performed by: SPECIALIST

## 2022-02-02 PROCEDURE — 82948 REAGENT STRIP/BLOOD GLUCOSE: CPT

## 2022-02-02 PROCEDURE — 99232 SBSQ HOSP IP/OBS MODERATE 35: CPT | Performed by: STUDENT IN AN ORGANIZED HEALTH CARE EDUCATION/TRAINING PROGRAM

## 2022-02-02 PROCEDURE — 99232 SBSQ HOSP IP/OBS MODERATE 35: CPT | Performed by: NURSE PRACTITIONER

## 2022-02-02 RX ORDER — DEXTROSE MONOHYDRATE 25 G/50ML
25 INJECTION, SOLUTION INTRAVENOUS ONCE
Status: COMPLETED | OUTPATIENT
Start: 2022-02-02 | End: 2022-02-02

## 2022-02-02 RX ORDER — DEXTROSE MONOHYDRATE 25 G/50ML
INJECTION, SOLUTION INTRAVENOUS
Status: COMPLETED
Start: 2022-02-02 | End: 2022-02-02

## 2022-02-02 RX ORDER — DEXAMETHASONE SODIUM PHOSPHATE 4 MG/ML
6 INJECTION, SOLUTION INTRA-ARTICULAR; INTRALESIONAL; INTRAMUSCULAR; INTRAVENOUS; SOFT TISSUE EVERY 24 HOURS
Status: DISCONTINUED | OUTPATIENT
Start: 2022-02-02 | End: 2022-02-05

## 2022-02-02 RX ORDER — DEXTROSE AND SODIUM CHLORIDE 5; .45 G/100ML; G/100ML
75 INJECTION, SOLUTION INTRAVENOUS CONTINUOUS
Status: DISCONTINUED | OUTPATIENT
Start: 2022-02-02 | End: 2022-02-05

## 2022-02-02 RX ADMIN — MECLIZINE 12.5 MG: 12.5 TABLET ORAL at 09:19

## 2022-02-02 RX ADMIN — GABAPENTIN 100 MG: 100 CAPSULE ORAL at 21:39

## 2022-02-02 RX ADMIN — GABAPENTIN 100 MG: 100 CAPSULE ORAL at 09:18

## 2022-02-02 RX ADMIN — PROPRANOLOL HYDROCHLORIDE 10 MG: 10 TABLET ORAL at 17:43

## 2022-02-02 RX ADMIN — DEXTROSE AND SODIUM CHLORIDE 100 ML/HR: 5; .45 INJECTION, SOLUTION INTRAVENOUS at 23:55

## 2022-02-02 RX ADMIN — OLANZAPINE 7.5 MG: 5 TABLET, FILM COATED ORAL at 21:39

## 2022-02-02 RX ADMIN — AMLODIPINE BESYLATE 10 MG: 10 TABLET ORAL at 09:19

## 2022-02-02 RX ADMIN — CARBIDOPA AND LEVODOPA 1 TABLET: 25; 100 TABLET ORAL at 09:17

## 2022-02-02 RX ADMIN — DEXAMETHASONE SODIUM PHOSPHATE 6 MG: 4 INJECTION, SOLUTION INTRAMUSCULAR; INTRAVENOUS at 16:24

## 2022-02-02 RX ADMIN — DEXTROSE MONOHYDRATE 25 ML: 500 INJECTION PARENTERAL at 11:44

## 2022-02-02 RX ADMIN — PROPRANOLOL HYDROCHLORIDE 10 MG: 10 TABLET ORAL at 09:19

## 2022-02-02 RX ADMIN — SODIUM CHLORIDE 100 ML/HR: 0.9 INJECTION, SOLUTION INTRAVENOUS at 04:18

## 2022-02-02 RX ADMIN — DEXTROSE MONOHYDRATE 25 ML: 25 INJECTION, SOLUTION INTRAVENOUS at 11:44

## 2022-02-02 RX ADMIN — CARBIDOPA AND LEVODOPA 1 TABLET: 25; 100 TABLET ORAL at 21:39

## 2022-02-02 RX ADMIN — DIVALPROEX SODIUM 250 MG: 250 TABLET, DELAYED RELEASE ORAL at 09:18

## 2022-02-02 RX ADMIN — DEXTROSE AND SODIUM CHLORIDE 100 ML/HR: 5; .45 INJECTION, SOLUTION INTRAVENOUS at 16:19

## 2022-02-02 RX ADMIN — ATORVASTATIN CALCIUM 40 MG: 40 TABLET, FILM COATED ORAL at 09:17

## 2022-02-02 RX ADMIN — HEPARIN SODIUM 5000 UNITS: 5000 INJECTION INTRAVENOUS; SUBCUTANEOUS at 21:39

## 2022-02-02 RX ADMIN — HEPARIN SODIUM 5000 UNITS: 5000 INJECTION INTRAVENOUS; SUBCUTANEOUS at 06:31

## 2022-02-02 RX ADMIN — ZOLPIDEM TARTRATE 5 MG: 5 TABLET ORAL at 21:39

## 2022-02-02 RX ADMIN — GABAPENTIN 100 MG: 100 CAPSULE ORAL at 16:19

## 2022-02-02 RX ADMIN — LEVOTHYROXINE SODIUM 100 MCG: 100 TABLET ORAL at 06:31

## 2022-02-02 RX ADMIN — HEPARIN SODIUM 5000 UNITS: 5000 INJECTION INTRAVENOUS; SUBCUTANEOUS at 16:19

## 2022-02-02 RX ADMIN — ACETAMINOPHEN 650 MG: 325 TABLET ORAL at 04:19

## 2022-02-02 RX ADMIN — POLYETHYLENE GLYCOL 3350 119 G: 17 POWDER, FOR SOLUTION ORAL at 06:31

## 2022-02-02 RX ADMIN — REMDESIVIR 100 MG: 100 INJECTION, POWDER, LYOPHILIZED, FOR SOLUTION INTRAVENOUS at 17:44

## 2022-02-02 RX ADMIN — CEFTRIAXONE 1000 MG: 1 INJECTION, SOLUTION INTRAVENOUS at 21:38

## 2022-02-02 RX ADMIN — DIVALPROEX SODIUM 500 MG: 500 TABLET, EXTENDED RELEASE ORAL at 21:39

## 2022-02-02 NOTE — PLAN OF CARE
Problem: PHYSICAL THERAPY ADULT  Goal: Performs mobility at highest level of function for planned discharge setting  See evaluation for individualized goals  Description: Treatment/Interventions: Functional transfer training,LE strengthening/ROM,Therapeutic exercise,Endurance training,Patient/family training,Gait training,Bed mobility,Equipment eval/education  Equipment Recommended: Hina Christianson       See flowsheet documentation for full assessment, interventions and recommendations  Outcome: Progressing  Note: Prognosis: Fair  Problem List: Decreased strength,Decreased endurance,Impaired balance,Decreased mobility,Pain  Assessment: Pt was seen for a PT tx session on 2/2/2022, including bed mobility, transfers, and ambulation  Since previus session, pt has made progress in terms of increased ambulation distance and continuous mobility  Barriers to session was pain in R leg and low blood sugar  Current goals and POC remain appropriate, the pt continues to have rehab potential and is making progress towards the STG's  The pt's prognosis is fair, pending pt progress with hospital stay and medical status  PT recommendation is return to facility with rehab services upon d/c  PT will continue to see pt during hospital stay to address deficits mentioned above and give interventions consistent with POC to achieve STG's  Barriers to Discharge: None        PT Discharge Recommendation: Return to facility with rehabilitation services          See flowsheet documentation for full assessment

## 2022-02-02 NOTE — ASSESSMENT & PLAN NOTE
· Initially did not require any oxygen on admission; but became hypoxia and now on 3-4L NC   · Admitted under mild pathway  · Due to her comorbidity, started on remdesivir day #3/5  · Added dexamethasone 6 mg daily on 2/2/22- day #1/10  · D-dimers- 1 2- continue on heparin 5000 units t i d   · Will hold off on initiating baricitinib  · CT abdomen and pelvis- without evidence of pneumonia  · Trend inflammatory markers  · Incentive spirometry, prone/side-lying position, cough and deep breathing

## 2022-02-02 NOTE — ASSESSMENT & PLAN NOTE
· Patient was NPO for possible repeat sigmoidoscopy today  · She has not been drinking very well  · Started on clear liquid diet  · Gentle IV fluid with dextrose  · Hypoglycemia protocol  · The patient received half an amp D50 today for blood glucose of 48

## 2022-02-02 NOTE — PROGRESS NOTES
Progress Note- Rashmi Geronimo 77 y o  female MRN: [id number]    Unit/Bed#: -01 Encounter: [id number]      Assessment and Plan:    Patient is a 59-year-old female with PMH significant for hypothyroidism, HTN, mood disorder and schizophrenia, Parkinson disease, and GERD who presented from her personal care home on 01/30 for generalized weakness and one episode of nonbloody/nonbilious vomiting  During admission, patient was found to be positive for COVID-19, not requiring supplemental O2  CT A/P with significant findings for severe distention of the colon with gas and stool with abrupt transition of the proximal sigmoid; the visualized distal esophagus is distended with material  Labs overall unremarkable, with the exception of mild hyponatremia and an elevated CRP  UA unremarkable  EGD on 2/1due to concern for esophageal dilation without retained material in the esophagus; hiatal hernia; presbyesophagus  Colonoscopy on 2/1 due to colonic dilation with solid stool despite tap water enema x2; area of ulcerated, erythematous mucosa at the extent of exam, unclear if stricture or mass present as solid immobile stool obscured views; biopsies taken of ulcerated mucosa  Recommended to await pathology due to inadequate prep and start a cautious bowel prep cleanse with MiraLAX (14 packets in 64 oz liquid w/ patient slowly consuming this)  D/c parameters for worsening distention, abdominal pain, or nausea/vomiting  Ideally would like to repeat colonoscopy, but this will be determined by response to bowel prep, abdominal exam, and biopsy results  Today, patient with continued tense distention, although slightly improved from yesterday  Continues to deny pain  Per nursing, patient is tolerating extended bowel prep without resulting BM  Passing flatus  Surgery was consulted and noted concern for stercoral perforation - Will require urgent laparotomy with resection/diversion of obstructed sigmoid colon   At this point, if patient does not have a BM with completion of bowel prep, will hold any further prep for reevaluation - With d/c parameters to immediately hold prep and inform GI and surgery of worsening distention, abdominal pain, nausea/vomiting, or other acute peritoneal sxs's  - Continue NPO  - Continue IVF  - Continue slow/cautious bowel prep with d/c parameters as stated above - If patient is able to tolerate/finish prep, no additional prep prior to reevalaution  - Surgery following, appreciate recommendations        GI will continue to follow  ______________________________________________________________________    Subjective:     Patient found comfortably sitting upright in her bedside chair  Per nursing staff patient has finished the 1st half of her bowel prep as ordered, without resulting BM  Patient states she has passed flatus  Patient admits to continued abdominal distention, but denies abdominal pain  Patient is NPO, but tolerate sips with meds, without nausea/vomiting  Denies all other GI related complaints      Medication Administration - last 24 hours from 02/01/2022 1136 to 02/02/2022 1136       Date/Time Order Dose Route Action Action by     02/02/2022 0919 amLODIPine (NORVASC) tablet 10 mg 10 mg Oral Given Elizabeth Huang RN     02/02/2022 8758 atorvastatin (LIPITOR) tablet 40 mg 40 mg Oral Given Elizabeth Huang RN     02/02/2022 7002 carbidopa-levodopa (SINEMET)  mg per tablet 1 tablet 1 tablet Oral Given Elizabeth Huang RN     02/01/2022 2029 carbidopa-levodopa (SINEMET)  mg per tablet 1 tablet 1 tablet Oral Given Lina Nguyễn RN     02/01/2022 2107 divalproex sodium (DEPAKOTE ER) 24 hr tablet 500 mg 500 mg Oral Given Lina Nguyễn RN     02/02/2022 0918 divalproex sodium (DEPAKOTE) EC tablet 250 mg 250 mg Oral Given Elizabeth Huang RN     02/02/2022 5270 gabapentin (NEURONTIN) capsule 100 mg 100 mg Oral Given Elizabeth Huang RN     02/01/2022 2029 gabapentin (NEURONTIN) capsule 100 mg 100 mg Oral Given Camryn Yepez, RN     02/01/2022 1545 gabapentin (NEURONTIN) capsule 100 mg 100 mg Oral Given My Dumont, RN     02/02/2022 0631 levothyroxine tablet 100 mcg 100 mcg Oral Given Camryn Marleni, RN     02/02/2022 0919 meclizine (ANTIVERT) tablet 12 5 mg 12 5 mg Oral Given My Dumont, RN     02/01/2022 2107 OLANZapine (ZyPREXA) tablet 7 5 mg 7 5 mg Oral Given Camryn Marleni, RN     02/02/2022 0919 propranolol (INDERAL) tablet 10 mg 10 mg Oral Given My Julia, RN     02/01/2022 1727 propranolol (INDERAL) tablet 10 mg 10 mg Oral Given My Dumont, RN     02/01/2022 2107 zolpidem (AMBIEN) tablet 5 mg 5 mg Oral Given Rossyryne Marleni, RN     02/02/2022 0419 acetaminophen (TYLENOL) tablet 650 mg 650 mg Oral Given Camryn Yepez, RN     02/02/2022 0631 heparin (porcine) subcutaneous injection 5,000 Units 5,000 Units Subcutaneous Given Rossyryne Marleni, RN     02/01/2022 2107 heparin (porcine) subcutaneous injection 5,000 Units 5,000 Units Subcutaneous Given Rossyryne Marleni, RN     02/01/2022 1545 heparin (porcine) subcutaneous injection 5,000 Units 5,000 Units Subcutaneous Given My Garrettkell, RN     02/01/2022 1728 remdesivir (Veklury) 100 mg in sodium chloride 0 9 % 270 mL IVPB 100 mg Intravenous New Bag My Dumont, RN     02/02/2022 0418 sodium chloride 0 9 % infusion 100 mL/hr Intravenous New Bag Camryn Yepez, RN     02/01/2022 1443 sodium chloride 0 9 % infusion   Intravenous [facility], CRNA     02/01/2022 1442 sodium chloride 0 9 % infusion   Intravenous Paused Cristal Whatley, CRNA     02/01/2022 1402 sodium chloride 0 9 % infusion   Intravenous Continue from [facility], CRNA     02/01/2022 2029 cefTRIAXone (ROCEPHIN) IVPB (premix in dextrose) 1,000 mg 50 mL 1,000 mg Intravenous New Bag Camryn Yepez, RN     02/01/2022 1546 polyethylene glycol (MIRALAX) packet 119 g 119 g Oral Given My Dumont RN     02/02/2022 6454 polyethylene glycol (MIRALAX) packet 119 g 119 g Oral Given Danelle Arizmendi RN          Objective:     Vitals: Blood pressure 154/79, pulse 72, temperature 97 5 °F (36 4 °C), temperature source Oral, resp  rate 20, height 5' 6" (1 676 m), weight 92 5 kg (203 lb 14 8 oz), SpO2 93 %  ,Body mass index is 32 91 kg/m²        Intake/Output Summary (Last 24 hours) at 2/2/2022 1136  Last data filed at 2/2/2022 0900  Gross per 24 hour   Intake 1100 ml   Output --   Net 1100 ml       Physical Exam:   General Appearance: Awake and alert, in no acute distress  Abdomen: No tenderness, +tense abd distention, mildly improved; +hypoactive bowel sounds; no masses, no organomegaly     Invasive Devices  Report    Peripheral Intravenous Line            Peripheral IV 02/01/22 Proximal;Right;Ventral (anterior) Forearm 1 day          Drain            External Urinary Catheter 1 day                Lab Results:  Admission on 01/31/2022   Component Date Value    Color, UA 01/31/2022 Yellow     Clarity, UA 01/31/2022 Slightly Cloudy*    Specific Gravity, UA 01/31/2022 >=1 030*    pH, UA 01/31/2022 5 5     Leukocytes, UA 01/31/2022 Negative     Nitrite, UA 01/31/2022 Negative     Protein, UA 01/31/2022 Negative     Glucose, UA 01/31/2022 Negative     Ketones, UA 01/31/2022 Negative     Urobilinogen, UA 01/31/2022 0 2     Bilirubin, UA 01/31/2022 Negative     Blood, UA 01/31/2022 Negative     WBC 01/31/2022 8 63     RBC 01/31/2022 4 00     Hemoglobin 01/31/2022 11 1*    Hematocrit 01/31/2022 34 9     MCV 01/31/2022 87     MCH 01/31/2022 27 8     MCHC 01/31/2022 31 8     RDW 01/31/2022 14 7     MPV 01/31/2022 9 4     Platelets 28/23/9531 227     nRBC 01/31/2022 0     Neutrophils Relative 01/31/2022 65     Immat GRANS % 01/31/2022 2     Lymphocytes Relative 01/31/2022 18     Monocytes Relative 01/31/2022 14*    Eosinophils Relative 01/31/2022 1     Basophils Relative 01/31/2022 0     Neutrophils Absolute 01/31/2022 5 65  Immature Grans Absolute 01/31/2022 0 16     Lymphocytes Absolute 01/31/2022 1 52     Monocytes Absolute 01/31/2022 1 19     Eosinophils Absolute 01/31/2022 0 08     Basophils Absolute 01/31/2022 0 03     Sodium 01/31/2022 130*    Potassium 01/31/2022 4 8     Chloride 01/31/2022 95*    CO2 01/31/2022 29     ANION GAP 01/31/2022 6     BUN 01/31/2022 49*    Creatinine 01/31/2022 0 92     Glucose 01/31/2022 122     Calcium 01/31/2022 9 4     AST 01/31/2022 16     ALT 01/31/2022 5*    Alkaline Phosphatase 01/31/2022 70     Total Protein 01/31/2022 7 2     Albumin 01/31/2022 3 7     Total Bilirubin 01/31/2022 0 28     eGFR 01/31/2022 61     Protime 01/31/2022 13 0     INR 01/31/2022 0 99     PTT 01/31/2022 34     hs TnI 0hr 01/31/2022 5     SARS-CoV-2 01/31/2022 Positive*    INFLUENZA A PCR 01/31/2022 Negative     INFLUENZA B PCR 01/31/2022 Negative     RSV PCR 01/31/2022 Negative     Valproic Acid, Total 01/31/2022 60     TSH 3RD GENERATON 01/31/2022 5  136     Procalcitonin 01/31/2022 <0 05     CRP 01/31/2022 34 7*    D-Dimer, Quant 01/31/2022 1 20*    NT-proBNP 01/31/2022 253     Total CK 01/31/2022 104     LACTIC ACID 01/31/2022 0 5     Osmolality, Ur 01/31/2022 652     Sodium, Ur 01/31/2022 9     Procalcitonin 02/01/2022 <0 05     Sodium 02/01/2022 133*    Potassium 02/01/2022 4 4     Chloride 02/01/2022 99     CO2 02/01/2022 28     ANION GAP 02/01/2022 6     BUN 02/01/2022 40*    Creatinine 02/01/2022 0 73     Glucose 02/01/2022 103     Glucose, Fasting 02/01/2022 103*    Calcium 02/01/2022 9 1     Corrected Calcium 02/01/2022 9 6     AST 02/01/2022 15     ALT 02/01/2022 <3*    Alkaline Phosphatase 02/01/2022 69     Total Protein 02/01/2022 6 7     Albumin 02/01/2022 3 4*    Total Bilirubin 02/01/2022 0 22     eGFR 02/01/2022 80     Magnesium 02/01/2022 1 9     Phosphorus 02/01/2022 3 1     WBC 02/01/2022 7 00     RBC 02/01/2022 4 18     Hemoglobin 02/01/2022 11 7     Hematocrit 02/01/2022 35 8     MCV 02/01/2022 86     MCH 02/01/2022 28 0     MCHC 02/01/2022 32 7     RDW 02/01/2022 14 6     MPV 02/01/2022 9 7     Platelets 12/76/3806 223     Lipase 02/01/2022 12     CRP 02/01/2022 30 3*    Segmented % 02/01/2022 63     Bands % 02/01/2022 3     Lymphocytes % 02/01/2022 22     Monocytes % 02/01/2022 12     Eosinophils, % 02/01/2022 0     Basophils % 02/01/2022 0     Absolute Neutrophils 02/01/2022 4 62     Lymphocytes Absolute 02/01/2022 1 54     Monocytes Absolute 02/01/2022 0 84     Eosinophils Absolute 02/01/2022 0 00     Basophils Absolute 02/01/2022 0 00     RBC Morphology 02/01/2022 Normal     Platelet Estimate 63/32/2927 Adequate     Ventricular Rate 01/31/2022 64     Atrial Rate 01/31/2022 64     OH Interval 01/31/2022 140     QRSD Interval 01/31/2022 84     QT Interval 01/31/2022 408     QTC Interval 01/31/2022 420     P Axis 01/31/2022 38     QRS Axis 01/31/2022 -20     T Wave Axis 01/31/2022 17     POC Glucose 02/02/2022 48*       Imaging Studies: I have personally reviewed pertinent imaging studies

## 2022-02-02 NOTE — PHYSICAL THERAPY NOTE
Physical Therapy Treatment Session Note    Time in: 1100  Time out: 1131  Total tx time: 31 minutes            02/02/22 1131   PT Last Visit   PT Visit Date 02/02/22   Note Type   Note Type Treatment   Pain Assessment   Pain Assessment Tool 0-10   Pain Score No Pain  (no pain upon therapy arrival,4/10 pain w/ movement in R leg)   Pain Location/Orientation Orientation: Right;Location: Leg   Pain Onset/Description Descriptor: [facility] Pain Intervention(s) Repositioned; Ambulation/increased activity   Multiple Pain Sites No   Restrictions/Precautions   Weight Bearing Precautions Per Order No   Other Precautions Fall Risk;Multiple lines;O2;Pain   General   Chart Reviewed Yes   Family/Caregiver Present No   Cognition   Overall Cognitive Status WFL   Arousal/Participation Alert; Responsive; Cooperative   Attention Attends with cues to redirect   Orientation Level Oriented X4   Following Commands Follows one step commands without difficulty   Comments pt is agreeable to therapy    Subjective   Subjective "My leg hurts"   Bed Mobility   Rolling R 3  Moderate assistance   Additional items Assist x 2;HOB elevated; Bedrails; Increased time required;Verbal cues  (VC's given to grab bedrails )   Supine to Sit 3  Moderate assistance   Additional items Assist x 2;Bedrails;HOB elevated; Increased time required;LE management; Impulsive  (VC's given to push up from bed and move legs )   Additional Comments pt denied lighthead/dizziness with movement    Transfers   Sit to Stand 4  Minimal assistance   Additional items Assist x 1; Increased time required;Verbal cues  (VC's given to push up from bed )   Stand to Sit 4  Minimal assistance   Additional items Assist x 1; Armrests; Verbal cues  (VC's given to reach one hand back to armrests on recliner )   Ambulation/Elevation   Gait pattern Improper Weight shift;Decreased foot clearance; Short stride; Excessively slow; Shuffling   Gait Assistance 4  Minimal assist   Additional items Assist x 1;Verbal cues  (VC's given for walker management )   Assistive Device Rolling walker   Distance 20 feet to recliner   Stair Management Assistance Not tested   Ambulation/Elevation Additional Comments Upon therapy arrival, O2 w/ NC was set to 3L  With ambulation, pt denied symptoms, but O2 desaturated to 87-89, O2 w/ NC was increased to 6 L  When sitting in recliner still denying symptoms, O2 w/ NC was bumped down to the original setting of 3L   Balance   Static Sitting Fair +   Dynamic Sitting Fair   Static Standing Fair   Dynamic Standing Fair -   Ambulatory Fair -   Endurance Deficit   Endurance Deficit No   Activity Tolerance   Activity Tolerance Patient limited by pain   Nurse Made Aware Yes, RN Maulik Mcdowell made aware    Exercises   Balance training    (exercises deferred d/t low blood sugar )   Assessment   Prognosis Fair   Problem List Decreased strength;Decreased endurance; Impaired balance;Decreased mobility;Pain   Assessment Pt was seen for a PT tx session on 2/2/2022, including bed mobility, transfers, and ambulation  Since previus session, pt has made progress in terms of increased ambulation distance and continuous mobility  Barriers to session was pain in R leg and low blood sugar  Current goals and POC remain appropriate, the pt continues to have rehab potential and is making progress towards the STG's  The pt's prognosis is fair, pending pt progress with hospital stay and medical status  PT recommendation is return to facility with rehab services upon d/c  PT will continue to see pt during hospital stay to address deficits mentioned above and give interventions consistent with POC to achieve STG's  Barriers to Discharge None   Goals   Patient Goals "to leave hospital"   STG Expiration Date 02/11/22   Short Term Goal #1 goals remain appropriate    PT Treatment Day 1   Plan   Treatment/Interventions Functional transfer training;LE strengthening/ROM; Therapeutic exercise; Endurance training;Patient/family training;Equipment eval/education; Bed mobility;Gait training   Progress Progressing toward goals   PT Frequency 3-5x/wk   Recommendation   PT Discharge Recommendation Return to facility with rehabilitation services   Equipment Recommended Walker   Additional Comments Pt's raw score on AM-PAC Basic Mobility Inpatient Short Form is 17, standardized score is 39 67  Pt's at this level are likely to benefit from d/c to PAR, however, please refer to therapist recommendation for safe d/c planning  AM-PAC Basic Mobility Inpatient   Turning in Bed Without Bedrails 2   Lying on Back to Sitting on Edge of Flat Bed 3   Moving Bed to Chair 3   Standing Up From Chair 3   Walk in Room 3   Climb 3-5 Stairs 3   Basic Mobility Inpatient Raw Score 17   Basic Mobility Standardized Score 39 67   Highest Level Of Mobility   JH-HLM Goal 5: Stand one or more mins   JH-HLM Highest Level of Mobility 6: Walk 10 steps or more   JH-HLM Goal Achieved Yes   End of Consult   Patient Position at End of Consult Bed/Chair alarm activated; Bedside chair; All needs within reach   End of Consult Comments   (Nurse took pt's blood sugar,report to be 48   Nurse was aware)       Faraz Garcia, SPT

## 2022-02-02 NOTE — QUICK NOTE
Examined on rounds this morning  Patient is good spirits, states she is passing gas, and has the urge for a bowel movement  Abdomen is distended and tympanic, but non tender  GI is planning another attempt for sigmoidoscopy for diagnosis after more prep  Cautiously optimistic that this course will unfold as planned, at this point the patient does not have peritoneal signs, however, she is at risk for a stercoral perforation, and if peritoneal signs develop will require urgent laparotomy with resection / diversion of obstructed sigmoid colon

## 2022-02-02 NOTE — PLAN OF CARE
Problem: MOBILITY - ADULT  Goal: Maintain or return to baseline ADL function  Description: INTERVENTIONS:  -  Assess patient's ability to carry out ADLs; assess patient's baseline for ADL function and identify physical deficits which impact ability to perform ADLs (bathing, care of mouth/teeth, toileting, grooming, dressing, etc )  - Assess/evaluate cause of self-care deficits   - Assess range of motion  - Assess patient's mobility; develop plan if impaired  - Assess patient's need for assistive devices and provide as appropriate  - Encourage maximum independence but intervene and supervise when necessary  - Involve family in performance of ADLs  - Assess for home care needs following discharge   - Consider OT consult to assist with ADL evaluation and planning for discharge  - Provide patient education as appropriate  Outcome: Progressing  Goal: Maintains/Returns to pre admission functional level  Description: INTERVENTIONS:  - Perform BMAT or MOVE assessment daily    - Set and communicate daily mobility goal to care team and patient/family/caregiver  - Collaborate with rehabilitation services on mobility goals if consulted  - Perform Range of Motion 3 times a day  - Reposition patient every 2 hours    - Dangle patient 2 times a day  - Stand patient 2 times a day  - Ambulate patient 2 times a day  - Out of bed to chair 2 times a day   - Out of bed for meals 2 times a day  - Out of bed for toileting  - Record patient progress and toleration of activity level   Outcome: Progressing     Problem: Prexisting or High Potential for Compromised Skin Integrity  Goal: Skin integrity is maintained or improved  Description: INTERVENTIONS:  - Identify patients at risk for skin breakdown  - Assess and monitor skin integrity  - Assess and monitor nutrition and hydration status  - Monitor labs   - Assess for incontinence   - Turn and reposition patient  - Assist with mobility/ambulation  - Relieve pressure over bony prominences  - Avoid friction and shearing  - Provide appropriate hygiene as needed including keeping skin clean and dry  - Evaluate need for skin moisturizer/barrier cream  - Collaborate with interdisciplinary team   - Patient/family teaching  - Consider wound care consult   Outcome: Progressing     Problem: PAIN - ADULT  Goal: Verbalizes/displays adequate comfort level or baseline comfort level  Description: Interventions:  - Encourage patient to monitor pain and request assistance  - Assess pain using appropriate pain scale  - Administer analgesics based on type and severity of pain and evaluate response  - Implement non-pharmacological measures as appropriate and evaluate response  - Consider cultural and social influences on pain and pain management  - Notify physician/advanced practitioner if interventions unsuccessful or patient reports new pain  Outcome: Progressing     Problem: INFECTION - ADULT  Goal: Absence or prevention of progression during hospitalization  Description: INTERVENTIONS:  - Assess and monitor for signs and symptoms of infection  - Monitor lab/diagnostic results  - Monitor all insertion sites, i e  indwelling lines, tubes, and drains  - Monitor endotracheal if appropriate and nasal secretions for changes in amount and color  - [place] appropriate cooling/warming therapies per order  - Administer medications as ordered  - Instruct and encourage patient and family to use good hand hygiene technique  - Identify and instruct in appropriate isolation precautions for identified infection/condition  Outcome: Progressing  Goal: Absence of fever/infection during neutropenic period  Description: INTERVENTIONS:  - Monitor WBC    Outcome: Progressing     Problem: SAFETY ADULT  Goal: Patient will remain free of falls  Description: INTERVENTIONS:  - Educate patient/family on patient safety including physical limitations  - Instruct patient to call for assistance with activity   - Consult OT/PT to assist with strengthening/mobility   - Keep Call bell within reach  - Keep bed low and locked with side rails adjusted as appropriate  - Keep care items and personal belongings within reach  - Initiate and maintain comfort rounds  - Make Fall Risk Sign visible to staff  - Offer Toileting every 2 Hours, in advance of need  - Initiate/Maintain bed alarm  - Obtain necessary fall risk management equipment  - Apply yellow socks and bracelet for high fall risk patients  - Consider moving patient to room near nurses station  Outcome: Progressing     Problem: DISCHARGE PLANNING  Goal: Discharge to home or other facility with appropriate resources  Description: INTERVENTIONS:  - Identify barriers to discharge w/patient and caregiver  - Arrange for needed discharge resources and transportation as appropriate  - Identify discharge learning needs (meds, wound care, etc )  - Arrange for interpretive services to assist at discharge as needed  - Refer to Case Management Department for coordinating discharge planning if the patient needs post-hospital services based on physician/advanced practitioner order or complex needs related to functional status, cognitive ability, or social support system  Outcome: Progressing     Problem: Knowledge Deficit  Goal: Patient/family/caregiver demonstrates understanding of disease process, treatment plan, medications, and discharge instructions  Description: Complete learning assessment and assess knowledge base    Interventions:  - Provide teaching at level of understanding  - Provide teaching via preferred learning methods  Outcome: Progressing

## 2022-02-02 NOTE — PROGRESS NOTES
Progress Note - General Surgery   Arnold Jameson 77 y o  female MRN: [id number]  Unit/Bed#: -01 Encounter: [id number]    Assessment:  76yo F presenting w/ colonic ileus vs obstruction   - Afebrile, VSS  - no AM labs  - 02/01 CTAP: "Severe distention of the colon with gas and stool with abrupt transition at the proximal sigmoid  While a colonic ileus can be considered, a subtle obstructing process at the proximal sigmoid colon is not excluded and follow-up colonoscopy is   Recommended "  - patient underwent flex sigmoidoscope yesterday with GI, however there was still a significant amount of solid stool which was obstructing view of area of concern  Miralax was ordered for pt today to aid in clearing of colon  Personally TT GI who is planning to repeat scope today or tomorrow  Primary concern from a surgical standpoint is degree of distension and concern for colonic perforation if she does not open up  Patient reports passing flatus and having urge to have bowel movement  Discussed with Dr Vito Daley and GI, will continue to closely monitor during hospitalization and if GI is unable to complete repeat scope, patient will likely need diverting ostomy to decompress bowel  Patient states that she does not want any "major" intervention, but is agreeable to colostomy if indicated  COVID19 +  - tested positive 01/31/22    PMH: Hypothyroidism, schizophrenia, PD, GERD     Plan:  Patient seen and examined by Dr Vito Daley at bedside    - Continue NPO status, sips with meds  - IVF hydration   - Repeat scope per GI recs   -  Monitor vitals, AM labs  - Medical optimization per primary team, we will continue to follow     Subjective/Objective   Subjective: No acute events overnight  Patient states she feels well today  She denies N/V  Denies abdominal pain States she has been passing flatus, denies having bowel movement today       Objective: Blood pressure 154/79, pulse 72, temperature 97 5 °F (36 4 °C), temperature source Oral, resp  rate 20, height 5' 6" (1 676 m), weight 92 5 kg (203 lb 14 8 oz), SpO2 93 %  ,Body mass index is 32 91 kg/m²  Intake/Output Summary (Last 24 hours) at 2/2/2022 1112  Last data filed at 2/2/2022 0900  Gross per 24 hour   Intake 1100 ml   Output --   Net 1100 ml       Invasive Devices  Report    Peripheral Intravenous Line            Peripheral IV 02/01/22 Proximal;Right;Ventral (anterior) Forearm <1 day          Drain            External Urinary Catheter 1 day                Physical Exam: /79 (BP Location: Right arm)   Pulse 72   Temp 97 5 °F (36 4 °C) (Oral)   Resp 20   Ht 5' 6" (1 676 m)   Wt 92 5 kg (203 lb 14 8 oz)   SpO2 93%   BMI 32 91 kg/m²   General appearance: alert and oriented, in no acute distress, appears stated age and cooperative  Head: Normocephalic, without obvious abnormality, atraumatic  Lungs: clear to auscultation bilaterally  Heart: regular rate and rhythm, S1, S2 normal, no murmur, click, rub or gallop  Abdomen: soft, severe distension, tympanic, bowel sounds hypoactive, no peritoneal signs   Extremities: extremities normal, warm and well-perfused; no cyanosis, clubbing, or edema    Lab, Imaging and other studies:I have personally reviewed pertinent lab results    , CBC: No results found for: WBC, HGB, HCT, MCV, PLT, ADJUSTEDWBC, MCH, MCHC, RDW, MPV, NRBC, CMP: No results found for: SODIUM, K, CL, CO2, ANIONGAP, BUN, CREATININE, GLUCOSE, CALCIUM, AST, ALT, ALKPHOS, PROT, BILITOT, EGFR  VTE Pharmacologic Prophylaxis: Heparin  VTE Mechanical Prophylaxis: sequential compression device    Anson Vanessa PA-C  02/02/22

## 2022-02-02 NOTE — PROGRESS NOTES
Jose 46  Progress Note Janny Leroy 1945, 77 y o  female MRN: [id number]  Unit/Bed#: -01 Encounter: [id number]  Primary Care Provider: No primary care provider on file  Date and time admitted to hospital: 1/31/2022  1:56 PM    * Ileus [facility])  Assessment & Plan  · As evident on CT a/p with severe distension of the colon with gas and stool with a rub transition at the proximal sigmoid  Suspected this could be related to a colonic ileus  Distal esophagus is distended with material    · Gastroenterology input appreciated-  · Underwent EGD and flexible sigmoidoscopy 2/1- unable to visualize due to poor bowel prep  Area of ulceration noted  Biopsy pending  · At this time GI recommend to continue cautious MiraLax clean now and hoping that the patient will be able to pass the stool without any surgical intervention  If patient able to start moving her bowel GI will likely repeat flex-sig tomorrow  · Surgery input appreciated   · As the patient does not any peritoneal signs, recommend to continue with MiraLax    Monitor closely as she is at risk for stercoral perforation which will require urgent laparotomy with resection/diversion of sigmoid colon  · Okay for clear liquid for now   · IV fluid  · Continue with serial abdominal exam   · Supportive care    COVID-19  Assessment & Plan  · Initially did not require any oxygen on admission; but became hypoxia and now on 3-4L NC   · Admitted under mild pathway  · Due to her comorbidity, started on remdesivir day #3/5  · Added dexamethasone 6 mg daily on 2/2/22- day #1/10  · D-dimers- 1 2- continue on heparin 5000 units t i d   · Will hold off on initiating baricitinib  · CT abdomen and pelvis- without evidence of pneumonia  · Trend inflammatory markers  · Incentive spirometry, prone/side-lying position, cough and deep breathing    Hypoglycemia  Assessment & Plan  · Patient was NPO for possible repeat sigmoidoscopy today  · She has not been drinking very well  · Started on clear liquid diet  · Gentle IV fluid with dextrose  · Hypoglycemia protocol  · The patient received half an amp D50 today for blood glucose of 48      Seizure disorder (HCC)  Assessment & Plan  · Valproic acid level-60  · Continue with home regimen      Hyponatremia  Assessment & Plan  · Likely due to hypovolemia hyponatremia with recent vomiting- improving with IVF   · Continue gentle IVF   · Monitor Na level      Acquired hypothyroidism  Assessment & Plan  · TSH - 5 136  · Continue with levothyroxine     Primary hypertension  Assessment & Plan  · Monitor BP closely    · Will resume lisinopril   · Continue with propranolol and amlodipine      Parkinson's disease (HCC)  Assessment & Plan  · Continue sinemet   · PT/OT evaluation       Other schizophrenia ([facility] [address] )  Assessment & Plan  · Continue with home regimen          VTE Prophylaxis:  Heparin    Patient Centered Rounds: I have performed bedside rounds with nursing staff today  Discussions with Specialists or Other Care Team Provider:  GI and surgery  Education and Discussions with Family / Patient:  Patient and caregiver    Current Length of Stay: 1 day(s)    Current Patient Status: Inpatient   Certification Statement: The patient will continue to require additional inpatient hospital stay due to ileus    Discharge Plan: pending hospital course     Code Status: Level 1 - Full Code    Subjective:   Feeling okay  Denies any abdominal pain  Objective:     Vitals:   Temp (24hrs), Av 7 °F (36 5 °C), Min:97 5 °F (36 4 °C), Max:98 2 °F (36 8 °C)    Temp:  [97 5 °F (36 4 °C)-98 2 °F (36 8 °C)] 98 2 °F (36 8 °C)  HR:  [64-72] 66  Resp:  [14-20] 18  BP: (129-154)/() 129/71  SpO2:  [93 %-99 %] 95 %  Body mass index is 32 91 kg/m²  Input and Output Summary (last 24 hours):        Intake/Output Summary (Last 24 hours) at 2022 1613  Last data filed at 2022 1300  Gross per 24 hour   Intake 900 ml   Output --   Net 900 ml       Physical Exam:   Physical Exam  Vitals and nursing note reviewed  Constitutional:       General: She is not in acute distress  Appearance: Normal appearance  She is obese  HENT:      Head: Normocephalic and atraumatic  Right Ear: External ear normal       Left Ear: External ear normal       Nose: Nose normal  No rhinorrhea  Mouth/Throat:      Mouth: Mucous membranes are moist       Pharynx: Oropharynx is clear  Eyes:      General:         Right eye: No discharge  Left eye: No discharge  Pupils: Pupils are equal, round, and reactive to light  Cardiovascular:      Rate and Rhythm: Normal rate and regular rhythm  Pulses: Normal pulses  Heart sounds: Normal heart sounds  No murmur heard  Pulmonary:      Effort: Pulmonary effort is normal  No respiratory distress  Breath sounds: Normal breath sounds  Abdominal:      General: Bowel sounds are normal  There is distension  Palpations: Abdomen is soft  There is no mass  Tenderness: There is no abdominal tenderness  Musculoskeletal:         General: No swelling or tenderness  Normal range of motion  Cervical back: Normal range of motion and neck supple  No muscular tenderness  Skin:     General: Skin is warm and dry  Capillary Refill: Capillary refill takes less than 2 seconds  Coloration: Skin is pale  Findings: No erythema or rash  Neurological:      General: No focal deficit present  Mental Status: She is alert and oriented to person, place, and time  Mental status is at baseline  Psychiatric:         Mood and Affect: Mood normal          Behavior: Behavior normal          Thought Content:  Thought content normal          Additional Data:     Labs:    Results from last 7 days   Lab Units 02/01/22  0432 01/31/22  1451 01/31/22  1451   WBC Thousand/uL 7 00   < > 8 63   HEMOGLOBIN g/dL 11 7   < > 11 1*   HEMATOCRIT % 35 8   < > 34 9   PLATELETS Thousands/uL 223   < > 227   NEUTROS PCT %  --   --  65   LYMPHS PCT %  --   --  18   LYMPHO PCT % 22  --   --    MONOS PCT %  --   --  14*   MONO PCT % 12  --   --    EOS PCT % 0  --  1    < > = values in this interval not displayed  Results from last 7 days   Lab Units 02/01/22  0432   SODIUM mmol/L 133*   POTASSIUM mmol/L 4 4   CHLORIDE mmol/L 99   CO2 mmol/L 28   BUN mg/dL 40*   CREATININE mg/dL 0 73   CALCIUM mg/dL 9 1   ALK PHOS U/L 69   ALT U/L <3*   AST U/L 15     Results from last 7 days   Lab Units 01/31/22  1451   INR  0 99     Results from last 7 days   Lab Units 02/02/22  1210 02/02/22  1126   POC GLUCOSE mg/dl 104 48*           * I Have Reviewed All Lab Data Listed Above  * Additional Pertinent Lab Tests Reviewed:  [facility] Admission  Reviewed    Imaging:  Imaging Reports Reviewed Today Include: n/a     Recent Cultures (last 7 days):           Last 24 Hours Medication List:   Current Facility-Administered Medications   Medication Dose Route Frequency Provider Last Rate    acetaminophen  650 mg Oral Q6H PRN ARTHUR Granger      amLODIPine  10 mg Oral Daily ARTHUR Granger      atorvastatin  40 mg Oral Daily ARTHUR Granger      carbidopa-levodopa  1 tablet Oral BID ARTHUR Granger      cefTRIAXone  1,000 mg Intravenous Q24H ARTHUR Granger 1,000 mg (02/01/22 2029)    dexamethasone  6 mg Intravenous Q24H ARTHUR Granger      dextrose 5 % and sodium chloride 0 45 %  100 mL/hr Intravenous Continuous ARTHUR Granger      divalproex sodium  500 mg Oral HS ARTHUR Granger      divalproex sodium  250 mg Oral Daily ARTHUR Granger      gabapentin  100 mg Oral TID ARTHUR Granger      heparin (porcine)  5,000 Units Subcutaneous [facility] ARTHUR Granger      levothyroxine  100 mcg Oral Early Morning ARTHUR Granger      meclizine  12 5 mg Oral Daily ARTHUR Granger      OLANZapine  7 5 mg Oral HS Jose Maria Cary ARTHUR Lopez      ondansetron  4 mg Intravenous Once PRN Marvin Lindsay MD      propranolol  10 mg Oral BID Jose Pretty, ARTHUR      remdesivir  100 mg Intravenous Q24H Jose Pretty, ARTHUR      zolpidem  5 mg Oral HS Jose Pretty, ARTHUR          Today, Patient Was Seen By: ARTHUR Cage    ** Please Note: Dictation voice to text software may have been used in the creation of this document   **

## 2022-02-02 NOTE — ASSESSMENT & PLAN NOTE
· As evident on CT a/p with severe distension of the colon with gas and stool with a rub transition at the proximal sigmoid  Suspected this could be related to a colonic ileus  Distal esophagus is distended with material    · Gastroenterology input appreciated-  · Underwent EGD and flexible sigmoidoscopy 2/1- unable to visualize due to poor bowel prep  Area of ulceration noted  Biopsy pending  · At this time GI recommend to continue cautious MiraLax clean now and hoping that the patient will be able to pass the stool without any surgical intervention  If patient able to start moving her bowel GI will likely repeat flex-sig tomorrow  · Surgery input appreciated   · As the patient does not any peritoneal signs, recommend to continue with MiraLax    Monitor closely as she is at risk for stercoral perforation which will require urgent laparotomy with resection/diversion of sigmoid colon  · Okay for clear liquid for now   · IV fluid  · Continue with serial abdominal exam   · Supportive care

## 2022-02-03 LAB
ALBUMIN SERPL BCP-MCNC: 3.4 G/DL (ref 3.5–5)
ALP SERPL-CCNC: 70 U/L (ref 34–104)
ALT SERPL W P-5'-P-CCNC: <3 U/L (ref 7–52)
ANION GAP SERPL CALCULATED.3IONS-SCNC: 8 MMOL/L (ref 4–13)
AST SERPL W P-5'-P-CCNC: 14 U/L (ref 13–39)
BASOPHILS # BLD AUTO: 0.03 THOUSANDS/ΜL (ref 0–0.1)
BASOPHILS NFR BLD AUTO: 0 % (ref 0–1)
BILIRUB SERPL-MCNC: 0.25 MG/DL (ref 0.2–1)
BUN SERPL-MCNC: 26 MG/DL (ref 5–25)
CALCIUM ALBUM COR SERPL-MCNC: 9.6 MG/DL (ref 8.3–10.1)
CALCIUM SERPL-MCNC: 9.1 MG/DL (ref 8.4–10.2)
CHLORIDE SERPL-SCNC: 97 MMOL/L (ref 96–108)
CO2 SERPL-SCNC: 26 MMOL/L (ref 21–32)
CREAT SERPL-MCNC: 0.64 MG/DL (ref 0.6–1.3)
EOSINOPHIL # BLD AUTO: 0 THOUSAND/ΜL (ref 0–0.61)
EOSINOPHIL NFR BLD AUTO: 0 % (ref 0–6)
ERYTHROCYTE [DISTWIDTH] IN BLOOD BY AUTOMATED COUNT: 14.7 % (ref 11.6–15.1)
GFR SERPL CREATININE-BSD FRML MDRD: 87 ML/MIN/1.73SQ M
GLUCOSE SERPL-MCNC: 131 MG/DL (ref 65–140)
GLUCOSE SERPL-MCNC: 149 MG/DL (ref 65–140)
GLUCOSE SERPL-MCNC: 171 MG/DL (ref 65–140)
GLUCOSE SERPL-MCNC: 179 MG/DL (ref 65–140)
GLUCOSE SERPL-MCNC: 185 MG/DL (ref 65–140)
HCT VFR BLD AUTO: 39.5 % (ref 34.8–46.1)
HGB BLD-MCNC: 12.5 G/DL (ref 11.5–15.4)
IMM GRANULOCYTES # BLD AUTO: 0.2 THOUSAND/UL (ref 0–0.2)
IMM GRANULOCYTES NFR BLD AUTO: 1 % (ref 0–2)
LYMPHOCYTES # BLD AUTO: 1.38 THOUSANDS/ΜL (ref 0.6–4.47)
LYMPHOCYTES NFR BLD AUTO: 10 % (ref 14–44)
MCH RBC QN AUTO: 27.5 PG (ref 26.8–34.3)
MCHC RBC AUTO-ENTMCNC: 31.6 G/DL (ref 31.4–37.4)
MCV RBC AUTO: 87 FL (ref 82–98)
MONOCYTES # BLD AUTO: 0.77 THOUSAND/ΜL (ref 0.17–1.22)
MONOCYTES NFR BLD AUTO: 6 % (ref 4–12)
NEUTROPHILS # BLD AUTO: 11.42 THOUSANDS/ΜL (ref 1.85–7.62)
NEUTS SEG NFR BLD AUTO: 83 % (ref 43–75)
NRBC BLD AUTO-RTO: 0 /100 WBCS
PLATELET # BLD AUTO: 278 THOUSANDS/UL (ref 149–390)
PMV BLD AUTO: 9.4 FL (ref 8.9–12.7)
POTASSIUM SERPL-SCNC: 4.9 MMOL/L (ref 3.5–5.3)
PROT SERPL-MCNC: 6.5 G/DL (ref 6.4–8.4)
RBC # BLD AUTO: 4.54 MILLION/UL (ref 3.81–5.12)
SODIUM SERPL-SCNC: 131 MMOL/L (ref 135–147)
WBC # BLD AUTO: 13.8 THOUSAND/UL (ref 4.31–10.16)

## 2022-02-03 PROCEDURE — 85025 COMPLETE CBC W/AUTO DIFF WBC: CPT | Performed by: PHYSICIAN ASSISTANT

## 2022-02-03 PROCEDURE — 82948 REAGENT STRIP/BLOOD GLUCOSE: CPT

## 2022-02-03 PROCEDURE — 99232 SBSQ HOSP IP/OBS MODERATE 35: CPT | Performed by: STUDENT IN AN ORGANIZED HEALTH CARE EDUCATION/TRAINING PROGRAM

## 2022-02-03 PROCEDURE — 80053 COMPREHEN METABOLIC PANEL: CPT | Performed by: NURSE PRACTITIONER

## 2022-02-03 PROCEDURE — 99232 SBSQ HOSP IP/OBS MODERATE 35: CPT

## 2022-02-03 PROCEDURE — 99232 SBSQ HOSP IP/OBS MODERATE 35: CPT | Performed by: NURSE PRACTITIONER

## 2022-02-03 RX ORDER — POLYETHYLENE GLYCOL 3350 17 G/17G
119 POWDER, FOR SOLUTION ORAL ONCE
Status: COMPLETED | OUTPATIENT
Start: 2022-02-03 | End: 2022-02-03

## 2022-02-03 RX ADMIN — GABAPENTIN 100 MG: 100 CAPSULE ORAL at 22:18

## 2022-02-03 RX ADMIN — HEPARIN SODIUM 5000 UNITS: 5000 INJECTION INTRAVENOUS; SUBCUTANEOUS at 05:37

## 2022-02-03 RX ADMIN — DIVALPROEX SODIUM 500 MG: 500 TABLET, EXTENDED RELEASE ORAL at 22:18

## 2022-02-03 RX ADMIN — HEPARIN SODIUM 5000 UNITS: 5000 INJECTION INTRAVENOUS; SUBCUTANEOUS at 14:15

## 2022-02-03 RX ADMIN — DEXAMETHASONE SODIUM PHOSPHATE 6 MG: 4 INJECTION, SOLUTION INTRAMUSCULAR; INTRAVENOUS at 18:49

## 2022-02-03 RX ADMIN — PROPRANOLOL HYDROCHLORIDE 10 MG: 10 TABLET ORAL at 09:38

## 2022-02-03 RX ADMIN — GABAPENTIN 100 MG: 100 CAPSULE ORAL at 18:49

## 2022-02-03 RX ADMIN — OLANZAPINE 7.5 MG: 5 TABLET, FILM COATED ORAL at 22:18

## 2022-02-03 RX ADMIN — ZOLPIDEM TARTRATE 5 MG: 5 TABLET ORAL at 22:18

## 2022-02-03 RX ADMIN — AMLODIPINE BESYLATE 10 MG: 10 TABLET ORAL at 09:36

## 2022-02-03 RX ADMIN — LEVOTHYROXINE SODIUM 100 MCG: 100 TABLET ORAL at 05:37

## 2022-02-03 RX ADMIN — MECLIZINE 12.5 MG: 12.5 TABLET ORAL at 09:36

## 2022-02-03 RX ADMIN — PROPRANOLOL HYDROCHLORIDE 10 MG: 10 TABLET ORAL at 18:49

## 2022-02-03 RX ADMIN — HEPARIN SODIUM 5000 UNITS: 5000 INJECTION INTRAVENOUS; SUBCUTANEOUS at 22:21

## 2022-02-03 RX ADMIN — POLYETHYLENE GLYCOL 3350 119 G: 17 POWDER, FOR SOLUTION ORAL at 17:00

## 2022-02-03 RX ADMIN — POLYETHYLENE GLYCOL 3350 119 G: 17 POWDER, FOR SOLUTION ORAL at 12:07

## 2022-02-03 RX ADMIN — ATORVASTATIN CALCIUM 40 MG: 40 TABLET, FILM COATED ORAL at 09:36

## 2022-02-03 RX ADMIN — REMDESIVIR 100 MG: 100 INJECTION, POWDER, LYOPHILIZED, FOR SOLUTION INTRAVENOUS at 18:41

## 2022-02-03 RX ADMIN — DIVALPROEX SODIUM 250 MG: 250 TABLET, DELAYED RELEASE ORAL at 09:37

## 2022-02-03 RX ADMIN — CARBIDOPA AND LEVODOPA 1 TABLET: 25; 100 TABLET ORAL at 22:18

## 2022-02-03 RX ADMIN — GABAPENTIN 100 MG: 100 CAPSULE ORAL at 09:36

## 2022-02-03 RX ADMIN — CARBIDOPA AND LEVODOPA 1 TABLET: 25; 100 TABLET ORAL at 09:36

## 2022-02-03 NOTE — APP STUDENT NOTE
S:     CC: generalized weakness    HPI: Patient is a 77year old female who presented to the ED 1/31 from personal care home with generalized weakness and urinary frequency, and nausea that developed after her lunch with emesis x1  She denied any history of fever or chills, cough or cold symptoms  She was incidentally found to be COVID positive in the ED, and was not short of breath or requiring oxygen at that point  A CT scan of her abdomen and pelvis revealed suspected colonic ileus and distended distal esophagus  Urinalysis was negative on 1/31  She was admitted for further management of her ileus, also COVID positive so admitted to med surg on mild COVID pathway  O:   Vitals: 98 6-68-/82, 96% at rest on oxygen at 2L nc    Physical exam: Patient is pleasant and talkative, poor historian at baseline and jumping from one topic to the next during our conversation  She denies any abdominal pain or discomfort  Abdomen is obese, soft and non tender with positive bowel sounds x4  Denies any feelings of nausea at this time  Tolerating clear liquids  Incontinent of small soft brown BM at this time  Incontinence care was provided  Denies any burning on urination  Pure Wick external catheter is place draining yellow urine  Assessment/Plan:    1  Ileus:  -Patient presented to the ED 1/31 with weakness  Resident of [facility], miesha historian at baseline  Did have an episode of non blood emesis at [facility] prior to arrival to the ED   -1/31 CT abdomen and pelvis with contrast revealed severe distention of colon questionable ileus and distended distal esophagus  -GI consulted and following patient  - 2/1 had flexible sigmoidoscopy which showed retained stool despite tap water enema x2   There was a area of ulceration that was biopsied  -2/1 EGD revealed hiatal hernia, normal esophagus and duodenum  -patient will have a repeat flex sug on 2/4  -surgery also consulted and following patient  -continue clear liquid diet  -continue D5 1/2 NS at 100/hr  -continue Miralax as ordered    2  COVID 23  -Patient is a resident of [facility] poor historian, presented with weakness, urinary frequency and one episode of non bloody emesis on 1/31  -1/31/22 tested positive for COVID  -did not require oxygen on arrival to ER, was admitted under mild covid pathway  - as of yesterday oxygen requirements increased to the point where she required 3-4L, today she is on 2L and 96% SaO2 on same  -monitor CBC daily  Component Ref Range & Units 2/1/22 0432 1/31/22 1451   WBC 4 31 - 10 16 Thousand/uL 7 00  8 63    RBC 3 81 - 5 12 Million/uL 4 18  4 00    Hemoglobin 11 5 - 15 4 g/dL 11 7  11 1 Low     Hematocrit 34 8 - 46 1 % 35 8  34 9    MCV 82 - 98 fL 86  87    MCH 26 8 - 34 3 pg 28 0  27 8    MCHC 31 4 - 37 4 g/dL 32 7  31 8    RDW 11 6 - 15 1 % 14 6  14 7    MPV 8 9 - 12 7 fL 9 7  9 4    Platelets 491 - 006 Thousands/uL 223  227     -monitor chemistry daily    Component Ref Range & Units 2/1/22 0432 1/31/22 1451   Sodium 135 - 147 mmol/L 133 Low   130 Low     Potassium 3 5 - 5 3 mmol/L 4 4  4 8    Chloride 96 - 108 mmol/L 99  95 Low     CO2 21 - 32 mmol/L 28  29    ANION GAP 4 - 13 mmol/L 6  6    BUN 5 - 25 mg/dL 40 High   49 High     Creatinine 0 60 - 1 30 mg/dL 0 73  0 92 CM    Comment: Standardized to IDMS reference method   Glucose 65 - 140 mg/dL 103  122      -1/31 troponin x1 was 5  -1/31   -1/31 D dimer 1 20  -continue heparin 5000 units SQ every 8 hours for prophylaxis  -trend CRP  Component Ref Range & Units 2/1/22 0432 1/31/22 1919   CRP <3 0 mg/L 30 3 High   34 7 High                     -1/31 UA negative  -1/31 lactic acid 0 5  -encourage cough and deep breathing  -administer oxygen to keep SaO2 greater than or equal to 90%, continue to wean as tolerates  -encourage mobilization OOB to chair as tolerated   -PT/OT are following patient  -encourage to lay prone   -continue remdesivir day day 4/5  -continue decadron 6mg IV every 24 hours day 2/10    3  Hypoglycemia   -2/2 patient had hypoglycemic episode with blood sugar of 48 had amp dextrose improved to 104    -patient was NPO for flex sig possibly contributing factoe  -continue to monitor accuchecks every 6 hours  Component Ref Range & Units 2/3/22 0632 2/2/22 1937 2/2/22 1501 2/2/22 1210 2/2/22 1126   POC Glucose 65 - 140 mg/dl 185 High   192 High  CM  91  104  48 Low      -monitor for S/S hypoglycemia  -monitor chemistries  -follow hypoglycemia protocol  -continue D5 1/2 NS at 100/hr  -continue clear liquids    4  Hyponatremia  -felt probably a volume issue, patient wit poor PO intake and nausea prior to arrival  -continue to monitor chemistries    Component Ref Range & Units 2/3/22 0426 2/1/22 0432 1/31/22 1451   Sodium 135 - 147 mmol/L 131 Low   133 Low   130 Low     Potassium 3 5 - 5 3 mmol/L 4 9  4 4  4 8    Chloride 96 - 108 mmol/L 97  99  95 Low     CO2 21 - 32 mmol/L 26  28  29    ANION GAP 4 - 13 mmol/L 8  6  6    BUN 5 - 25 mg/dL 26 High   40 High   49 High     Creatinine 0 60 - 1 30 mg/dL 0 64  0 73 CM  0 92 CM    Comment: Standardized to IDMS reference method   Glucose 65 - 140 mg/dL 179 High   103 CM  122      -continue hydration with IVF D5 1/2NS at 100/hr  -continue clear liquids  -monitor I/O    Plan: Patient is a resident of [facility]  Return to [facility] when stable for discharge

## 2022-02-03 NOTE — PROGRESS NOTES
Progress Note - General Surgery   Lorri Landau 77 y o  female MRN: [id number]  Unit/Bed#: -01 Encounter: [id number]    Assessment:  77 y o  female with colonic ileus vs  obstruction   Afebrile, low O2 sat at 86%, other vital signs stable   Leukocytosis, 13 80 from 7 00   Hyponatremia, 131 from 133  2L O2 via NC  Underwent flexible sigmoidoscope 02/01/2022 with GI, however there was significant amount of solid stool obstructing area of concern  Completed bowel prep with 14 packets of MiraLAX, possible repeat colonoscopy per GI  Bowel function, actively passing flatus and two recent bowel movements    COVID-19   Positive 01/31/2022  Currently on 2L via NC     Plan:  Clear liquid diet as tolerated   IVF @ 100  Continue to monitor labs and abdominal exam   Antiemetics and analgesics prn  Incentive spirometry   PT/OT   Medical management per SLIM   GI following, appreciate input   To be discussed with surgeon    Subjective/Objective   Chief Complaint: None    Subjective: No acute overnight events  Patient is doing well this morning  Denies abdominal pain, nausea or vomiting  Tolerated entire breakfast tray  Admits to passing flatus and two recent bowel movements  Denies chest pain, palpitations, shortness of breath, calf tenderness  Denies fever, chills  Objective:   Blood pressure 117/82, pulse 63, temperature 98 6 °F (37 °C), resp  rate 20, height 5' 6" (1 676 m), weight 92 5 kg (203 lb 14 8 oz), SpO2 90 %  ,Body mass index is 32 91 kg/m²  Intake/Output Summary (Last 24 hours) at 2/3/2022 0829  Last data filed at 2/3/2022 0550  Gross per 24 hour   Intake 500 ml   Output 1300 ml   Net -800 ml       Invasive Devices  Report    Peripheral Intravenous Line            Peripheral IV 02/03/22 Dorsal (posterior); Right Wrist <1 day          Drain            External Urinary Catheter 2 days              Physical Exam: /82   Pulse 63   Temp 98 6 °F (37 °C)   Resp 20   Ht 5' 6" (1 676 m)   Wt 92 5 kg (203 lb 14 8 oz)   SpO2 90%   BMI 32 91 kg/m²   General appearance: alert and oriented, in no acute distress  Lungs: clear to auscultation bilaterally  Heart: regular rate and rhythm, S1, S2 normal, no murmur, click, rub or gallop  Abdomen: hypoactive bowel sounds; soft, distended, tympanic to percussion, nontender throuhgout all quadrants, no rebound or guarding   Extremities: extremities normal, warm and well-perfused; no cyanosis, clubbing, or edema  Skin: Skin color, texture, turgor normal  No rashes or lesions    Lab, Imaging and other studies:  I have personally reviewed pertinent lab results      CBC:   Lab Results   Component Value Date    WBC 13 80 (H) 02/03/2022    HGB 12 5 02/03/2022    HCT 39 5 02/03/2022    MCV 87 02/03/2022     02/03/2022    MCH 27 5 02/03/2022    MCHC 31 6 02/03/2022    RDW 14 7 02/03/2022    MPV 9 4 02/03/2022    NRBC 0 02/03/2022     CMP:   Lab Results   Component Value Date    SODIUM 131 (L) 02/03/2022    K 4 9 02/03/2022    CL 97 02/03/2022    CO2 26 02/03/2022    BUN 26 (H) 02/03/2022    CREATININE 0 64 02/03/2022    CALCIUM 9 1 02/03/2022    AST 14 02/03/2022    ALT <3 (L) 02/03/2022    ALKPHOS 70 02/03/2022    EGFR 87 02/03/2022     VTE Pharmacologic Prophylaxis: Heparin  VTE Mechanical Prophylaxis: sequential compression device    Jessica Avendano PA-C

## 2022-02-03 NOTE — CASE MANAGEMENT
Case Management Discharge Planning Note    Patient name Jaspreet Wright  Location [address] [phone]/-[id number] MRN [id number]  : 1945 Date 2/3/2022       Current Admission Date: 2022  Current Admission Diagnosis:Ileus [facility])   Patient Active Problem List    Diagnosis Date Noted    Hypoglycemia 2022    Other schizophrenia ([facility] [address] ) 2022    [facility] 2022    Primary hypertension 2022    Urinary incontinence 2022    Acquired hypothyroidism 2022    COVID-19 2022    [facility]us [facility] 2022    Hyponatremia 2022    Seizure disorder ([facility] ) 2022      LOS (days): 2  Geometric Mean LOS (GMLOS) (days): 4 70  Days to GMLOS:2 6     OBJECTIVE:  Risk of Unplanned Readmission Score: 18         Current admission status: Inpatient   Preferred Pharmacy: No Pharmacies Listed  Primary Care Provider: No primary care provider on file  Primary Insurance: MEDICARE  Secondary Insurance: [place] [employer]    DISCHARGE DETAILS:    Freedom of Choice: Yes  Comments - Freedom of Choice: Pt for potential discharge in the next 24-48 hours per Leonora from [facility]  CM spoke with Omar Vincent at [address] to give them 24 hours notice of potential return to the facility as pt need an isolation room  They will accept pt on Saturday if she is discharged then  CM will continue to follow       Requested  [facility] [facility] [address]         Is the patient interested in [facility] at discharge?: Yes (Pt will return to [facility] with [facility])

## 2022-02-03 NOTE — PLAN OF CARE
Problem: MOBILITY - ADULT  Goal: Maintain or return to baseline ADL function  Description: INTERVENTIONS:  -  Assess patient's ability to carry out ADLs; assess patient's baseline for ADL function and identify physical deficits which impact ability to perform ADLs (bathing, care of mouth/teeth, toileting, grooming, dressing, etc )  - Assess/evaluate cause of self-care deficits   - Assess range of motion  - Assess patient's mobility; develop plan if impaired  - Assess patient's need for assistive devices and provide as appropriate  - Encourage maximum independence but intervene and supervise when necessary  - Involve family in performance of ADLs  - Assess for home care needs following discharge   - Consider OT consult to assist with ADL evaluation and planning for discharge  - Provide patient education as appropriate  Outcome: Progressing  Goal: Maintains/Returns to pre admission functional level  Description: INTERVENTIONS:  - Perform BMAT or MOVE assessment daily    - Set and communicate daily mobility goal to care team and patient/family/caregiver  - Collaborate with rehabilitation services on mobility goals if consulted  - Perform Range of Motion 2 times a day  - Reposition patient every 2 hours    - Dangle patient 2 times a day  - Stand patient 2 times a day  - Ambulate patient 3 times a day  - Out of bed to chair 3 times a day   - Out of bed for meals 3 times a day  - Out of bed for toileting  - Record patient progress and toleration of activity level   Outcome: Progressing     Problem: Prexisting or High Potential for Compromised Skin Integrity  Goal: Skin integrity is maintained or improved  Description: INTERVENTIONS:  - Identify patients at risk for skin breakdown  - Assess and monitor skin integrity  - Assess and monitor nutrition and hydration status  - Monitor labs   - Assess for incontinence   - Turn and reposition patient  - Assist with mobility/ambulation  - Relieve pressure over bony prominences  - Avoid friction and shearing  - Provide appropriate hygiene as needed including keeping skin clean and dry  - Evaluate need for skin moisturizer/barrier cream  - Collaborate with interdisciplinary team   - Patient/family teaching  - Consider wound care consult   Outcome: Progressing     Problem: PAIN - ADULT  Goal: Verbalizes/displays adequate comfort level or baseline comfort level  Description: Interventions:  - Encourage patient to monitor pain and request assistance  - Assess pain using appropriate pain scale  - Administer analgesics based on type and severity of pain and evaluate response  - Implement non-pharmacological measures as appropriate and evaluate response  - Consider cultural and social influences on pain and pain management  - Notify physician/advanced practitioner if interventions unsuccessful or patient reports new pain  Outcome: Progressing     Problem: INFECTION - ADULT  Goal: Absence or prevention of progression during hospitalization  Description: INTERVENTIONS:  - Assess and monitor for signs and symptoms of infection  - Monitor lab/diagnostic results  - Monitor all insertion sites, i e  indwelling lines, tubes, and drains  - Monitor endotracheal if appropriate and nasal secretions for changes in amount and color  - [place] appropriate cooling/warming therapies per order  - Administer medications as ordered  - Instruct and encourage patient and family to use good hand hygiene technique  - Identify and instruct in appropriate isolation precautions for identified infection/condition  Outcome: Progressing  Goal: Absence of fever/infection during neutropenic period  Description: INTERVENTIONS:  - Monitor WBC    Outcome: Progressing     Problem: SAFETY ADULT  Goal: Patient will remain free of falls  Description: INTERVENTIONS:  - Educate patient/family on patient safety including physical limitations  - Instruct patient to call for assistance with activity   - Consult OT/PT to assist with strengthening/mobility   - Keep Call bell within reach  - Keep bed low and locked with side rails adjusted as appropriate  - Keep care items and personal belongings within reach  - Initiate and maintain comfort rounds  - Make Fall Risk Sign visible to staff  - Offer Toileting every 2  Hours, in advance of need  - Initiate/Maintain bed alarm  - Obtain necessary fall risk management equipment  - Apply yellow socks and bracelet for high fall risk patients  - Consider moving patient to room near nurses station  Outcome: Progressing     Problem: DISCHARGE PLANNING  Goal: Discharge to home or other facility with appropriate resources  Description: INTERVENTIONS:  - Identify barriers to discharge w/patient and caregiver  - Arrange for needed discharge resources and transportation as appropriate  - Identify discharge learning needs (meds, wound care, etc )     Refer to Case Management Department for coordinating discharge planning if the patient needs post-hospital services based on physician/advanced practitioner order or complex needs related to functional status, cognitive ability, or social support system  Outcome: Progressing     Problem: Knowledge Deficit  Goal: Patient/family/caregiver demonstrates understanding of disease process, treatment plan, medications, and discharge instructions  Description: Complete learning assessment and assess knowledge base    Interventions:  - Provide teaching at level of understanding  - Provide teaching via preferred learning methods  Outcome: Progressing     Problem: Potential for Falls  Goal: Patient will remain free of falls  Description: INTERVENTIONS:  - Educate patient/family on patient safety including physical limitations  - Instruct patient to call for assistance with activity   - Consult OT/PT to assist with strengthening/mobility   - Keep Call bell within reach  - Keep bed low and locked with side rails adjusted as appropriate  - Keep care items and personal belongings within reach  - Initiate and maintain comfort rounds  - Make Fall Risk Sign visible to staff  - Offer Toileting every 2 Hours, in advance of need  - Initiate/Maintain bed alarm  - Obtain necessary fall risk management equipment   - Apply yellow socks and bracelet for high fall risk patients  - Consider moving patient to room near nurses station  Outcome: Progressing

## 2022-02-03 NOTE — PLAN OF CARE
Problem: MOBILITY - ADULT  Goal: Maintain or return to baseline ADL function  Description: INTERVENTIONS:  -  Assess patient's ability to carry out ADLs; assess patient's baseline for ADL function and identify physical deficits which impact ability to perform ADLs (bathing, care of mouth/teeth, toileting, grooming, dressing, etc )  - Assess/evaluate cause of self-care deficits   - Assess range of motion  - Assess patient's mobility; develop plan if impaired  - Assess patient's need for assistive devices and provide as appropriate  - Encourage maximum independence but intervene and supervise when necessary  - Involve family in performance of ADLs  - Assess for home care needs following discharge   - Consider OT consult to assist with ADL evaluation and planning for discharge  - Provide patient education as appropriate  Outcome: Progressing  Goal: Maintains/Returns to pre admission functional level  Description: INTERVENTIONS:  - Perform BMAT or MOVE assessment daily    - Set and communicate daily mobility goal to care team and patient/family/caregiver  - Collaborate with rehabilitation services on mobility goals if consulted  - Perform Range of Motion 3 times a day  - Reposition patient every 2 hours    - Dangle patient 3 times a day  - Stand patient 3 times a day  - Ambulate patient 3 times a day  - Out of bed to chair 3 times a day   - Out of bed for meals 3 times a day  - Out of bed for toileting  - Record patient progress and toleration of activity level   Outcome: Progressing     Problem: Prexisting or High Potential for Compromised Skin Integrity  Goal: Skin integrity is maintained or improved  Description: INTERVENTIONS:  - Identify patients at risk for skin breakdown  - Assess and monitor skin integrity  - Assess and monitor nutrition and hydration status  - Monitor labs   - Assess for incontinence   - Turn and reposition patient  - Assist with mobility/ambulation  - Relieve pressure over bony prominences  - Avoid friction and shearing  - Provide appropriate hygiene as needed including keeping skin clean and dry  - Evaluate need for skin moisturizer/barrier cream  - Collaborate with interdisciplinary team   - Patient/family teaching  - Consider wound care consult   Outcome: Progressing     Problem: PAIN - ADULT  Goal: Verbalizes/displays adequate comfort level or baseline comfort level  Description: Interventions:  - Encourage patient to monitor pain and request assistance  - Assess pain using appropriate pain scale  - Administer analgesics based on type and severity of pain and evaluate response  - Implement non-pharmacological measures as appropriate and evaluate response  - Consider cultural and social influences on pain and pain management  - Notify physician/advanced practitioner if interventions unsuccessful or patient reports new pain  Outcome: Progressing     Problem: INFECTION - ADULT  Goal: Absence or prevention of progression during hospitalization  Description: INTERVENTIONS:  - Assess and monitor for signs and symptoms of infection  - Monitor lab/diagnostic results  - Monitor all insertion sites, i e  indwelling lines, tubes, and drains  - Monitor endotracheal if appropriate and nasal secretions for changes in amount and color  - [place] appropriate cooling/warming therapies per order  - Administer medications as ordered  - Instruct and encourage patient and family to use good hand hygiene technique  - Identify and instruct in appropriate isolation precautions for identified infection/condition  Outcome: Progressing  Goal: Absence of fever/infection during neutropenic period  Description: INTERVENTIONS:  - Monitor WBC    Outcome: Progressing     Problem: SAFETY ADULT  Goal: Patient will remain free of falls  Description: INTERVENTIONS:  - Educate patient/family on patient safety including physical limitations  - Instruct patient to call for assistance with activity   - Consult OT/PT to assist with strengthening/mobility   - Keep Call bell within reach  - Keep bed low and locked with side rails adjusted as appropriate  - Keep care items and personal belongings within reach  - Initiate and maintain comfort rounds  - Make Fall Risk Sign visible to staff  - Offer Toileting every 2 Hours, in advance of need  - Initiate/Maintain bed alarm  - Obtain necessary fall risk management equipment: walker   - Apply yellow socks and bracelet for high fall risk patients  - Consider moving patient to room near nurses station  Outcome: Progressing     Problem: DISCHARGE PLANNING  Goal: Discharge to home or other facility with appropriate resources  Description: INTERVENTIONS:  - Identify barriers to discharge w/patient and caregiver  - Arrange for needed discharge resources and transportation as appropriate  - Identify discharge learning needs (meds, wound care, etc )  - Arrange for interpretive services to assist at discharge as needed  - Refer to Case Management Department for coordinating discharge planning if the patient needs post-hospital services based on physician/advanced practitioner order or complex needs related to functional status, cognitive ability, or social support system  Outcome: Progressing     Problem: Knowledge Deficit  Goal: Patient/family/caregiver demonstrates understanding of disease process, treatment plan, medications, and discharge instructions  Description: Complete learning assessment and assess knowledge base    Interventions:  - Provide teaching at level of understanding  - Provide teaching via preferred learning methods  Outcome: Progressing     Problem: Potential for Falls  Goal: Patient will remain free of falls  Description: INTERVENTIONS:  - Educate patient/family on patient safety including physical limitations  - Instruct patient to call for assistance with activity   - Consult OT/PT to assist with strengthening/mobility   - Keep Call bell within reach  - Keep bed low and locked with side rails adjusted as appropriate  - Keep care items and personal belongings within reach  - Initiate and maintain comfort rounds  - Make Fall Risk Sign visible to staff  - Offer Toileting every 2 Hours, in advance of need  - Initiate/Maintain bed alarm  - Obtain necessary fall risk management equipment: walker   - Apply yellow socks and bracelet for high fall risk patients  - Consider moving patient to room near nurses station  Outcome: Progressing

## 2022-02-03 NOTE — ASSESSMENT & PLAN NOTE
· Likely due to hypovolemia hyponatremia with recent vomiting  · Continue gentle IVF   · Monitor Na level    · Resolved

## 2022-02-03 NOTE — PROGRESS NOTES
[facility]  67   Progress Note Gladis Biswas 1945, 77 y o  female MRN: [id number]  Unit/Bed#: -01 Encounter: [id number]  Primary Care Provider: No primary care provider on file  Date and time admitted to hospital: 1/31/2022  1:56 PM    * [facility])  Assessment & Plan  · As evident on CT a/p with severe distension of the colon with gas and stool with a rub transition at the proximal sigmoid  Suspected this could be related to a colonic ileus  Distal esophagus is distended with material    · Gastroenterology input appreciated-  · Underwent EGD and flexible sigmoidoscopy 2/1- unable to visualize due to poor bowel prep  Area of ulceration noted  Biopsy pending  · At this time GI recommend to continue cautious MiraLax clean now- patient with BMs  Planning for a repeat flexible sigmoidoscopy 2/4 if bowel prep is successful   · Surgery input appreciated   · As the patient does not any peritoneal signs, recommend to continue with MiraLax    Monitor closely as she is at risk for stercoral perforation which will require urgent laparotomy with resection/diversion of sigmoid colon  · Okay for clear liquid for now and NPO after MN per GI   · IV fluid  · Continue with serial abdominal exam  · Supportive care    COVID-19  Assessment & Plan  · Initially did not require any oxygen on admission; but became hypoxia and now on 2L NC - titrate off O2 to keep SpO2 greater than 90%  · Admitted under mild pathway   · Due to her comorbidity, started on remdesivir day #4/5  · Added dexamethasone 6 mg daily on 2/2/22- day #2/10  · D-dimers- 1 2- continue on heparin 5000 units t i d   · Will hold off on initiating baricitinib  · CT abdomen and pelvis- without evidence of pneumonia  · Trend inflammatory markers   · Incentive spirometry, prone/side-lying position, cough and deep breathing    Hypoglycemia  Assessment & Plan  · Patient was NPO for possible repeat sigmoidoscopy  · She has not been drinking very well  · Started on clear liquid diet  · Gentle IV fluid with dextrose  · Hypoglycemia protocol  · 2/3 The patient received half an amp D50 for blood glucose of 48      Seizure disorder (HCC)  Assessment & Plan  · Valproic acid level-60  · Continue with home regimen       Hyponatremia  Assessment & Plan  · Likely due to hypovolemia hyponatremia with recent vomiting  · Continue gentle IVF   · Monitor Na level    · Resolved     Acquired hypothyroidism  Assessment & Plan  · TSH - 5 136  · Continue with levothyroxine      Primary hypertension  Assessment & Plan  · Monitor BP closely    ·   · Continue with lisinopril, propranolol and amlodipine      Parkinson's disease (HCC)  Assessment & Plan  · Continue sinemet   · PT/OT evaluation        Other schizophrenia ([facility] [address] )  Assessment & Plan  · Continue with home regimen           VTE Prophylaxis:  Heparin    Patient Centered Rounds: I have performed bedside rounds with nursing staff today  Discussions with Specialists or Other Care Team Provider: GI, surgery   Education and Discussions with Family / Patient: patient and Navneet Gtz- POA/guardian     Current Length of Stay: 2 day(s)    Current Patient Status: Inpatient   Certification Statement: The patient will continue to require additional inpatient hospital stay due to ileus    Discharge Plan: pending hospital course     Code Status: Level 1 - Full Code    Subjective:   Feeling well  Denies any nausea vomiting or abdominal pain  Objective:     Vitals:   Temp (24hrs), Av 4 °F (36 9 °C), Min:98 2 °F (36 8 °C), Max:98 6 °F (37 °C)    Temp:  [98 2 °F (36 8 °C)-98 6 °F (37 °C)] 98 6 °F (37 °C)  HR:  [63-75] 68  Resp:  [18-20] 20  BP: (117-134)/(67-86) 117/82  SpO2:  [86 %-96 %] 96 %  Body mass index is 32 91 kg/m²  Input and Output Summary (last 24 hours):        Intake/Output Summary (Last 24 hours) at 2/3/2022 1212  Last data filed at 2/3/2022 0550  Gross per 24 hour   Intake 500 ml   Output 1300 ml   Net -800 ml Physical Exam:   Physical Exam  Vitals and nursing note reviewed  Constitutional:       General: She is not in acute distress  Appearance: Normal appearance  She is obese  HENT:      Head: Normocephalic and atraumatic  Right Ear: External ear normal       Left Ear: External ear normal       Nose: Nose normal  No rhinorrhea  Mouth/Throat:      Mouth: Mucous membranes are moist       Pharynx: Oropharynx is clear  Eyes:      General:         Right eye: No discharge  Left eye: No discharge  Pupils: Pupils are equal, round, and reactive to light  Cardiovascular:      Rate and Rhythm: Normal rate and regular rhythm  Pulses: Normal pulses  Heart sounds: Normal heart sounds  No murmur heard  Pulmonary:      Effort: Pulmonary effort is normal  No respiratory distress  Breath sounds: Normal breath sounds  Abdominal:      General: Bowel sounds are normal  There is no distension  Palpations: Abdomen is soft  There is no mass  Tenderness: There is no abdominal tenderness  Musculoskeletal:         General: No swelling or tenderness  Normal range of motion  Cervical back: Normal range of motion and neck supple  No muscular tenderness  Skin:     General: Skin is warm and dry  Capillary Refill: Capillary refill takes less than 2 seconds  Coloration: Skin is pale  Findings: No erythema or rash  Neurological:      General: No focal deficit present  Mental Status: She is alert and oriented to person, place, and time  Mental status is at baseline  Psychiatric:         Mood and Affect: Mood normal          Behavior: Behavior normal          Thought Content:  Thought content normal          Additional Data:     Labs:    Results from last 7 days   Lab Units 02/03/22  0426   WBC Thousand/uL 13 80*   HEMOGLOBIN g/dL 12 5   HEMATOCRIT % 39 5   PLATELETS Thousands/uL 278   NEUTROS PCT % 83*   LYMPHS PCT % 10*   MONOS PCT % 6   EOS PCT % 0 Results from last 7 days   Lab Units 02/03/22  0426   SODIUM mmol/L 131*   POTASSIUM mmol/L 4 9   CHLORIDE mmol/L 97   CO2 mmol/L 26   BUN mg/dL 26*   CREATININE mg/dL 0 64   CALCIUM mg/dL 9 1   ALK PHOS U/L 70   ALT U/L <3*   AST U/L 14     Results from last 7 days   Lab Units 01/31/22  1451   INR  0 99     Results from last 7 days   Lab Units 02/03/22  1101 02/03/22  0632 02/02/22  1937 02/02/22  1501 02/02/22  1210 02/02/22  1126   POC GLUCOSE mg/dl 171* 185* 192* 91 104 48*           * I Have Reviewed All Lab Data Listed Above  * Additional Pertinent Lab Tests Reviewed:  [place] 67 Admission  Reviewed    Imaging:  Imaging Reports Reviewed Today Include: n/a     Recent Cultures (last 7 days):           Last 24 Hours Medication List:   Current Facility-Administered Medications   Medication Dose Route Frequency Provider Last Rate    acetaminophen  650 mg Oral Q6H PRN ARTHUR Granger      amLODIPine  10 mg Oral Daily ARTHUR Granger      atorvastatin  40 mg Oral Daily ARTHUR Granger      carbidopa-levodopa  1 tablet Oral BID ARTHUR Granger      cefTRIAXone  1,000 mg Intravenous Q24H ARTHUR Granger 1,000 mg (02/02/22 2138)    dexamethasone  6 mg Intravenous Q24H ARTHUR Granger      dextrose 5 % and sodium chloride 0 45 %  50 mL/hr Intravenous Continuous ARTHUR Granger 50 mL/hr (02/03/22 1204)    divalproex sodium  500 mg Oral HS ARTHUR Granger      divalproex sodium  250 mg Oral Daily ARTHUR Granger      gabapentin  100 mg Oral TID ARTHUR Granger      heparin (porcine)  5,000 Units Subcutaneous [facility] ARTHUR Granger      levothyroxine  100 mcg Oral Early Morning ARTHUR Granger      meclizine  12 5 mg Oral Daily ARTHUR Granger      OLANZapine  7 5 mg Oral HS ARTHUR Granger      ondansetron  4 mg Intravenous Once PRN John Acevedo MD      polyethylene glycol  119 g Oral Once [place], [place]      propranolol  10 mg Oral BID ARTHUR Henry      remdesivir  100 mg Intravenous Q24H ARTHUR Henry 100 mg (02/02/22 1744)    zolpidem  5 mg Oral HS ARTHUR Henry          Today, Patient Was Seen By: ARTHUR Henry    ** Please Note: Dictation voice to text software may have been used in the creation of this document   **

## 2022-02-03 NOTE — ASSESSMENT & PLAN NOTE
· As evident on CT a/p with severe distension of the colon with gas and stool with a rub transition at the proximal sigmoid  Suspected this could be related to a colonic ileus  Distal esophagus is distended with material    · Gastroenterology input appreciated-  · Underwent EGD and flexible sigmoidoscopy 2/1- unable to visualize due to poor bowel prep  Area of ulceration noted  Biopsy pending  · At this time GI recommend to continue cautious MiraLax clean now- patient with BMs  Planning for a repeat flexible sigmoidoscopy 2/4 if bowel prep is successful   · Surgery input appreciated   · As the patient does not any peritoneal signs, recommend to continue with MiraLax    Monitor closely as she is at risk for stercoral perforation which will require urgent laparotomy with resection/diversion of sigmoid colon  · Okay for clear liquid for now and NPO after MN per GI   · IV fluid  · Continue with serial abdominal exam  · Supportive care

## 2022-02-03 NOTE — ASSESSMENT & PLAN NOTE
· Patient was NPO for possible repeat sigmoidoscopy  · She has not been drinking very well  · Started on clear liquid diet  · Gentle IV fluid with dextrose  · Hypoglycemia protocol  · 2/3 The patient received half an amp D50 for blood glucose of 48

## 2022-02-03 NOTE — PROGRESS NOTES
Progress Note- Caitlin Fernandez 77 y o  female MRN: [id number]    Unit/Bed#: -01 Encounter: [id number]      Assessment and Plan:    Patient is a 58-year-old female with PMH significant for hypothyroidism, HTN, mood disorder and schizophrenia, Parkinson disease, and GERD who presented from her personal care home on 01/30 for generalized weakness and one episode of nonbloody/nonbilious vomiting  During admission, patient was found to be COVID+ - Requiring O2 via NC   CT A/P with significant findings for severe distention of the colon with gas and stool with abrupt transition of the proximal sigmoid; the visualized distal esophagus is distended with material       EGD on 2/1 due to concern for esophageal dilation without retained material in the esophagus; hiatal hernia; presbyesophagus  Colonoscopy on 2/1 due to colonic dilation with solid stool despite tap water enema x2; area of ulcerated, erythematous mucosa at the extent of exam, unclear if stricture or mass present as solid immobile stool obscured views; biopsies taken of ulcerated mucosa  Surgery consulted, appreciate recommendation  Patient has complete a cautious bowel prep cleanse with MiraLAX (14 packets in 64 oz liquid w/ patient slowly consuming this), with three large volume loose brown/yellow BMs, continues passing flatus  Denies abdominal pain and abdominal distention is greatly improved       Given appropriate response to cautious bowel prep cleanse with MiraLAX, will continue (14 Packets in 64 oz of liquids to begin slowly consuming half now and 2nd half at 5:00 p m ) - With d/c parameters to immediately hold prep and inform GI and surgery if patient develops peritoneal signs  Tentative plans for repeat flex sigmoidoscopy tomorrow if successful prep - NPO at midnight     - Clear liquid diet; NPO at midnight  - Continue IVF  - Continue antiemetics and analgesics PRN  - Continue slow/cautious bowel prep with d/c parameters as stated above - 14 Packets in 64 oz of liquids to begin slowly consuming half now and 2nd half at 5:00 p m, ordered  - Tentative plans for repeat flex sig tomorrow (2/4) if prep successful  - Surgery following, appreciate recommendations      GI will continue to follow      ______________________________________________________________________    Subjective:     Patient found lying comfortably in bed  Has completed cautious bowel prep with resulting 3 large volume loose brown/yellow BMs per nursing staff  Continues passing flatus  No hematochezia/melena  Denies abdominal pain and improved abdominal distention  Tolerating a clear liquid diet, without nausea/vomiting  Denies all other GI related complaints      Medication Administration - last 24 hours from 02/02/2022 0844 to 02/03/2022 0844       Date/Time Order Dose Route Action Action by     02/02/2022 0919 amLODIPine (NORVASC) tablet 10 mg 10 mg Oral Given Milana Kingston RN     02/02/2022 0940 atorvastatin (LIPITOR) tablet 40 mg 40 mg Oral Given Milana Kingston RN     02/02/2022 2139 carbidopa-levodopa (SINEMET)  mg per tablet 1 tablet 1 tablet Oral Given Yaa Yu RN     02/02/2022 6323 carbidopa-levodopa (SINEMET)  mg per tablet 1 tablet 1 tablet Oral Given Milana Kingston RN     02/02/2022 2139 divalproex sodium (DEPAKOTE ER) 24 hr tablet 500 mg 500 mg Oral Given Yaa Yu RN     02/02/2022 0918 divalproex sodium (DEPAKOTE) EC tablet 250 mg 250 mg Oral Given Milana Kingston RN     02/02/2022 2139 gabapentin (NEURONTIN) capsule 100 mg 100 mg Oral Given Yaa Yu RN     02/02/2022 1619 gabapentin (NEURONTIN) capsule 100 mg 100 mg Oral Given Milana Kingston RN     02/02/2022 0340 gabapentin (NEURONTIN) capsule 100 mg 100 mg Oral Given Milana Kingston RN     02/03/2022 3778 levothyroxine tablet 100 mcg 100 mcg Oral Given Yaa Yu RN     02/02/2022 0900 meclizine (ANTIVERT) tablet 12 5 mg 12 5 mg Oral Given Milana Kingston RN     02/02/2022 2112 OLANZapine (ZyPREXA) tablet 7 5 mg 7 5 mg Oral Given Hipolito Arciniega, ELIAN     02/02/2022 1743 propranolol (INDERAL) tablet 10 mg 10 mg Oral Given Jennifer Mojica RN     02/02/2022 0919 propranolol (INDERAL) tablet 10 mg 10 mg Oral Given Jennifer Mojica RN     02/02/2022 2139 zolpidem (AMBIEN) tablet 5 mg 5 mg Oral Given Hipolito Arciniega RN     02/03/2022 0537 heparin (porcine) subcutaneous injection 5,000 Units 5,000 Units Subcutaneous Given Nazia Rosa RN     02/02/2022 2139 heparin (porcine) subcutaneous injection 5,000 Units 5,000 Units Subcutaneous Given Nazia Rosa RN     02/02/2022 1619 heparin (porcine) subcutaneous injection 5,000 Units 5,000 Units Subcutaneous Given Jennifer Mojica RN     02/02/2022 1744 remdesivir (Veklury) 100 mg in sodium chloride 0 9 % 270 mL IVPB 100 mg Intravenous New Bag Jennifer Mojica RN     02/02/2022 2138 cefTRIAXone (ROCEPHIN) IVPB (premix in dextrose) 1,000 mg 50 mL 1,000 mg Intravenous New Bag Nazia Rosa RN     02/02/2022 1144 dextrose 50 % IV solution 25 mL 25 mL Intravenous Given Jennifer Mojica RN     02/02/2022 2355 dextrose 5 % and sodium chloride 0 45 % infusion 100 mL/hr Intravenous New Bag Nazia Rosa RN     02/02/2022 1619 dextrose 5 % and sodium chloride 0 45 % infusion 100 mL/hr Intravenous New Bag Jennifer Mojica RN     02/02/2022 1624 dexamethasone (DECADRON) injection 6 mg 6 mg Intravenous Given Jennifer Mojica RN          Objective:     Vitals: Blood pressure 117/82, pulse 63, temperature 98 6 °F (37 °C), resp  rate 20, height 5' 6" (1 676 m), weight 92 5 kg (203 lb 14 8 oz), SpO2 90 %  ,Body mass index is 32 91 kg/m²        Intake/Output Summary (Last 24 hours) at 2/3/2022 0844  Last data filed at 2/3/2022 0550  Gross per 24 hour   Intake 500 ml   Output 1300 ml   Net -800 ml       Physical Exam:   General Appearance: Awake and alert, in no acute distress  Abdomen: Soft, non tender, +abd distention, greatly improved; +hypoactive bowel sounds; no masses, no organomegaly     Invasive Devices  Report    Peripheral Intravenous Line            Peripheral IV 02/03/22 Dorsal (posterior); Right Wrist <1 day          Drain            External Urinary Catheter 2 days                Lab Results:  Admission on 01/31/2022   Component Date Value    Color, UA 01/31/2022 Yellow     Clarity, UA 01/31/2022 Slightly Cloudy*    Specific Gravity, UA 01/31/2022 >=1 030*    pH, UA 01/31/2022 5 5     Leukocytes, UA 01/31/2022 Negative     Nitrite, UA 01/31/2022 Negative     Protein, UA 01/31/2022 Negative     Glucose, UA 01/31/2022 Negative     Ketones, UA 01/31/2022 Negative     Urobilinogen, UA 01/31/2022 0 2     Bilirubin, UA 01/31/2022 Negative     Blood, UA 01/31/2022 Negative     WBC 01/31/2022 8 63     RBC 01/31/2022 4 00     Hemoglobin 01/31/2022 11 1*    Hematocrit 01/31/2022 34 9     MCV 01/31/2022 87     MCH 01/31/2022 27 8     MCHC 01/31/2022 31 8     RDW 01/31/2022 14 7     MPV 01/31/2022 9 4     Platelets 18/69/0696 227     nRBC 01/31/2022 0     Neutrophils Relative 01/31/2022 65     Immat GRANS % 01/31/2022 2     Lymphocytes Relative 01/31/2022 18     Monocytes Relative 01/31/2022 14*    Eosinophils Relative 01/31/2022 1     Basophils Relative 01/31/2022 0     Neutrophils Absolute 01/31/2022 5 65     Immature Grans Absolute 01/31/2022 0 16     Lymphocytes Absolute 01/31/2022 1 52     Monocytes Absolute 01/31/2022 1 19     Eosinophils Absolute 01/31/2022 0 08     Basophils Absolute 01/31/2022 0 03     Sodium 01/31/2022 130*    Potassium 01/31/2022 4 8     Chloride 01/31/2022 95*    CO2 01/31/2022 29     ANION GAP 01/31/2022 6     BUN 01/31/2022 49*    Creatinine 01/31/2022 0 92     Glucose 01/31/2022 122     Calcium 01/31/2022 9 4     AST 01/31/2022 16     ALT 01/31/2022 5*    Alkaline Phosphatase 01/31/2022 70     Total Protein 01/31/2022 7 2     Albumin 01/31/2022 3 7     Total Bilirubin 01/31/2022 0 28     eGFR 01/31/2022 61  Protime 01/31/2022 13 0     INR 01/31/2022 0 99     PTT 01/31/2022 34     hs TnI 0hr 01/31/2022 5     SARS-CoV-2 01/31/2022 Positive*    INFLUENZA A PCR 01/31/2022 Negative     INFLUENZA B PCR 01/31/2022 Negative     RSV PCR 01/31/2022 Negative     Valproic Acid, Total 01/31/2022 60     TSH 3RD GENERATON 01/31/2022 5  136     Procalcitonin 01/31/2022 <0 05     CRP 01/31/2022 34 7*    D-Dimer, Quant 01/31/2022 1 20*    NT-proBNP 01/31/2022 253     Total CK 01/31/2022 104     LACTIC ACID 01/31/2022 0 5     Osmolality, Ur 01/31/2022 652     Sodium, Ur 01/31/2022 9     Procalcitonin 02/01/2022 <0 05     MRSA Culture Only 02/01/2022 Methicillin Resistant Staphylococcus aureus isolated*    MRSA Culture Only 02/01/2022 This patient requires contact isolation precautions per [place] law  Contact precautions are not required in [place] for nasal surveillance cultures       Sodium 02/01/2022 133*    Potassium 02/01/2022 4 4     Chloride 02/01/2022 99     CO2 02/01/2022 28     ANION GAP 02/01/2022 6     BUN 02/01/2022 40*    Creatinine 02/01/2022 0 73     Glucose 02/01/2022 103     Glucose, Fasting 02/01/2022 103*    Calcium 02/01/2022 9 1     Corrected Calcium 02/01/2022 9 6     AST 02/01/2022 15     ALT 02/01/2022 <3*    Alkaline Phosphatase 02/01/2022 69     Total Protein 02/01/2022 6 7     Albumin 02/01/2022 3 4*    Total Bilirubin 02/01/2022 0 22     eGFR 02/01/2022 80     Magnesium 02/01/2022 1 9     Phosphorus 02/01/2022 3 1     WBC 02/01/2022 7 00     RBC 02/01/2022 4 18     Hemoglobin 02/01/2022 11 7     Hematocrit 02/01/2022 35 8     MCV 02/01/2022 86     MCH 02/01/2022 28 0     MCHC 02/01/2022 32 7     RDW 02/01/2022 14 6     MPV 02/01/2022 9 7     Platelets 64/01/6239 223     Lipase 02/01/2022 12     CRP 02/01/2022 30 3*    Segmented % 02/01/2022 63     Bands % 02/01/2022 3     Lymphocytes % 02/01/2022 22     Monocytes % 02/01/2022 12     Eosinophils, % 02/01/2022 0     Basophils % 02/01/2022 0     Absolute Neutrophils 02/01/2022 4 62     Lymphocytes Absolute 02/01/2022 1 54     Monocytes Absolute 02/01/2022 0 84     Eosinophils Absolute 02/01/2022 0 00     Basophils Absolute 02/01/2022 0 00     RBC Morphology 02/01/2022 Normal     Platelet Estimate 74/34/2757 Adequate     Ventricular Rate 01/31/2022 64     Atrial Rate 01/31/2022 64     ID Interval 01/31/2022 140     QRSD Interval 01/31/2022 84     QT Interval 01/31/2022 408     QTC Interval 01/31/2022 420     P Axis 01/31/2022 38     QRS Axis 01/31/2022 -20     T Wave Axis 01/31/2022 17     POC Glucose 02/02/2022 48*    POC Glucose 02/02/2022 104     POC Glucose 02/02/2022 91     POC Glucose 02/02/2022 192*    WBC 02/03/2022 13 80*    RBC 02/03/2022 4 54     Hemoglobin 02/03/2022 12 5     Hematocrit 02/03/2022 39 5     MCV 02/03/2022 87     MCH 02/03/2022 27 5     MCHC 02/03/2022 31 6     RDW 02/03/2022 14 7     MPV 02/03/2022 9 4     Platelets 60/88/6888 278     nRBC 02/03/2022 0     Neutrophils Relative 02/03/2022 83*    Immat GRANS % 02/03/2022 1     Lymphocytes Relative 02/03/2022 10*    Monocytes Relative 02/03/2022 6     Eosinophils Relative 02/03/2022 0     Basophils Relative 02/03/2022 0     Neutrophils Absolute 02/03/2022 11 42*    Immature Grans Absolute 02/03/2022 0 20     Lymphocytes Absolute 02/03/2022 1 38     Monocytes Absolute 02/03/2022 0 77     Eosinophils Absolute 02/03/2022 0 00     Basophils Absolute 02/03/2022 0 03     Sodium 02/03/2022 131*    Potassium 02/03/2022 4 9     Chloride 02/03/2022 97     CO2 02/03/2022 26     ANION GAP 02/03/2022 8     BUN 02/03/2022 26*    Creatinine 02/03/2022 0 64     Glucose 02/03/2022 179*    Calcium 02/03/2022 9 1     Corrected Calcium 02/03/2022 9 6     AST 02/03/2022 14     ALT 02/03/2022 <3*    Alkaline Phosphatase 02/03/2022 70     Total Protein 02/03/2022 6 5     Albumin 02/03/2022 3 4*    Total Bilirubin 02/03/2022 0 25     eGFR 02/03/2022 87     POC Glucose 02/03/2022 185*       Imaging Studies: I have personally reviewed pertinent imaging studies

## 2022-02-03 NOTE — ASSESSMENT & PLAN NOTE
· Initially did not require any oxygen on admission; but became hypoxia and now on 2L NC - titrate off O2 to keep SpO2 greater than 90%  · Admitted under mild pathway   · Due to her comorbidity, started on remdesivir day #4/5  · Added dexamethasone 6 mg daily on 2/2/22- day #2/10  · D-dimers- 1 2- continue on heparin 5000 units t i d   · Will hold off on initiating baricitinib  · CT abdomen and pelvis- without evidence of pneumonia  · Trend inflammatory markers   · Incentive spirometry, prone/side-lying position, cough and deep breathing

## 2022-02-04 ENCOUNTER — ANESTHESIA EVENT (INPATIENT)
Dept: GASTROENTEROLOGY | Facility: HOSPITAL | Age: 77
DRG: 388 | End: 2022-02-04
Payer: MEDICARE

## 2022-02-04 ENCOUNTER — ANESTHESIA (INPATIENT)
Dept: GASTROENTEROLOGY | Facility: HOSPITAL | Age: 77
DRG: 388 | End: 2022-02-04
Payer: MEDICARE

## 2022-02-04 ENCOUNTER — APPOINTMENT (INPATIENT)
Dept: GASTROENTEROLOGY | Facility: HOSPITAL | Age: 77
DRG: 388 | End: 2022-02-04
Payer: MEDICARE

## 2022-02-04 PROBLEM — R39.89 SUSPECTED UTI: Status: ACTIVE | Noted: 2022-02-04

## 2022-02-04 LAB
ANION GAP SERPL CALCULATED.3IONS-SCNC: 6 MMOL/L (ref 4–13)
BUN SERPL-MCNC: 14 MG/DL (ref 5–25)
CALCIUM SERPL-MCNC: 8.9 MG/DL (ref 8.4–10.2)
CHLORIDE SERPL-SCNC: 97 MMOL/L (ref 96–108)
CO2 SERPL-SCNC: 29 MMOL/L (ref 21–32)
CREAT SERPL-MCNC: 0.53 MG/DL (ref 0.6–1.3)
ERYTHROCYTE [DISTWIDTH] IN BLOOD BY AUTOMATED COUNT: 14.8 % (ref 11.6–15.1)
GFR SERPL CREATININE-BSD FRML MDRD: 93 ML/MIN/1.73SQ M
GLUCOSE SERPL-MCNC: 116 MG/DL (ref 65–140)
GLUCOSE SERPL-MCNC: 128 MG/DL (ref 65–140)
GLUCOSE SERPL-MCNC: 143 MG/DL (ref 65–140)
GLUCOSE SERPL-MCNC: 165 MG/DL (ref 65–140)
GLUCOSE SERPL-MCNC: 172 MG/DL (ref 65–140)
GLUCOSE SERPL-MCNC: 175 MG/DL (ref 65–140)
HCT VFR BLD AUTO: 36.3 % (ref 34.8–46.1)
HGB BLD-MCNC: 11.9 G/DL (ref 11.5–15.4)
MCH RBC QN AUTO: 27.9 PG (ref 26.8–34.3)
MCHC RBC AUTO-ENTMCNC: 32.8 G/DL (ref 31.4–37.4)
MCV RBC AUTO: 85 FL (ref 82–98)
PLATELET # BLD AUTO: 249 THOUSANDS/UL (ref 149–390)
PMV BLD AUTO: 9 FL (ref 8.9–12.7)
POTASSIUM SERPL-SCNC: 4.6 MMOL/L (ref 3.5–5.3)
RBC # BLD AUTO: 4.27 MILLION/UL (ref 3.81–5.12)
SODIUM SERPL-SCNC: 132 MMOL/L (ref 135–147)
WBC # BLD AUTO: 12.32 THOUSAND/UL (ref 4.31–10.16)

## 2022-02-04 PROCEDURE — 45330 DIAGNOSTIC SIGMOIDOSCOPY: CPT | Performed by: STUDENT IN AN ORGANIZED HEALTH CARE EDUCATION/TRAINING PROGRAM

## 2022-02-04 PROCEDURE — 85027 COMPLETE CBC AUTOMATED: CPT | Performed by: NURSE PRACTITIONER

## 2022-02-04 PROCEDURE — 99232 SBSQ HOSP IP/OBS MODERATE 35: CPT | Performed by: SPECIALIST

## 2022-02-04 PROCEDURE — 0DJD8ZZ INSPECTION OF LOWER INTESTINAL TRACT, VIA NATURAL OR ARTIFICIAL OPENING ENDOSCOPIC: ICD-10-PCS | Performed by: STUDENT IN AN ORGANIZED HEALTH CARE EDUCATION/TRAINING PROGRAM

## 2022-02-04 PROCEDURE — 99232 SBSQ HOSP IP/OBS MODERATE 35: CPT | Performed by: NURSE PRACTITIONER

## 2022-02-04 PROCEDURE — 82948 REAGENT STRIP/BLOOD GLUCOSE: CPT

## 2022-02-04 PROCEDURE — 80048 BASIC METABOLIC PNL TOTAL CA: CPT | Performed by: NURSE PRACTITIONER

## 2022-02-04 RX ORDER — PROPOFOL 10 MG/ML
INJECTION, EMULSION INTRAVENOUS AS NEEDED
Status: DISCONTINUED | OUTPATIENT
Start: 2022-02-04 | End: 2022-02-04

## 2022-02-04 RX ORDER — PROPOFOL 10 MG/ML
INJECTION, EMULSION INTRAVENOUS CONTINUOUS PRN
Status: DISCONTINUED | OUTPATIENT
Start: 2022-02-04 | End: 2022-02-04

## 2022-02-04 RX ORDER — LIDOCAINE HYDROCHLORIDE 20 MG/ML
INJECTION, SOLUTION EPIDURAL; INFILTRATION; INTRACAUDAL; PERINEURAL AS NEEDED
Status: DISCONTINUED | OUTPATIENT
Start: 2022-02-04 | End: 2022-02-04

## 2022-02-04 RX ORDER — POLYETHYLENE GLYCOL 3350 17 G/17G
17 POWDER, FOR SOLUTION ORAL 2 TIMES DAILY
Status: DISCONTINUED | OUTPATIENT
Start: 2022-02-04 | End: 2022-02-08 | Stop reason: HOSPADM

## 2022-02-04 RX ORDER — GLYCOPYRROLATE 0.2 MG/ML
INJECTION INTRAMUSCULAR; INTRAVENOUS AS NEEDED
Status: DISCONTINUED | OUTPATIENT
Start: 2022-02-04 | End: 2022-02-04

## 2022-02-04 RX ADMIN — PROPRANOLOL HYDROCHLORIDE 10 MG: 10 TABLET ORAL at 09:28

## 2022-02-04 RX ADMIN — PROPRANOLOL HYDROCHLORIDE 10 MG: 10 TABLET ORAL at 17:40

## 2022-02-04 RX ADMIN — DEXTROSE AND SODIUM CHLORIDE 75 ML/HR: 5; .45 INJECTION, SOLUTION INTRAVENOUS at 14:03

## 2022-02-04 RX ADMIN — DIVALPROEX SODIUM 250 MG: 250 TABLET, DELAYED RELEASE ORAL at 09:30

## 2022-02-04 RX ADMIN — HEPARIN SODIUM 5000 UNITS: 5000 INJECTION INTRAVENOUS; SUBCUTANEOUS at 22:27

## 2022-02-04 RX ADMIN — GABAPENTIN 100 MG: 100 CAPSULE ORAL at 09:28

## 2022-02-04 RX ADMIN — GABAPENTIN 100 MG: 100 CAPSULE ORAL at 22:27

## 2022-02-04 RX ADMIN — LIDOCAINE HYDROCHLORIDE 50 MG: 20 INJECTION, SOLUTION EPIDURAL; INFILTRATION; INTRACAUDAL; PERINEURAL at 14:19

## 2022-02-04 RX ADMIN — AMLODIPINE BESYLATE 10 MG: 10 TABLET ORAL at 09:28

## 2022-02-04 RX ADMIN — GLYCOPYRROLATE 0.2 MG: 0.4 INJECTION INTRAMUSCULAR; INTRAVENOUS at 14:33

## 2022-02-04 RX ADMIN — CARBIDOPA AND LEVODOPA 1 TABLET: 25; 100 TABLET ORAL at 22:27

## 2022-02-04 RX ADMIN — DIVALPROEX SODIUM 500 MG: 500 TABLET, EXTENDED RELEASE ORAL at 22:27

## 2022-02-04 RX ADMIN — ZOLPIDEM TARTRATE 5 MG: 5 TABLET ORAL at 22:26

## 2022-02-04 RX ADMIN — POLYETHYLENE GLYCOL 3350 17 G: 17 POWDER, FOR SOLUTION ORAL at 22:27

## 2022-02-04 RX ADMIN — GABAPENTIN 100 MG: 100 CAPSULE ORAL at 17:00

## 2022-02-04 RX ADMIN — CARBIDOPA AND LEVODOPA 1 TABLET: 25; 100 TABLET ORAL at 09:28

## 2022-02-04 RX ADMIN — PROPOFOL 120 MCG/KG/MIN: 10 INJECTION, EMULSION INTRAVENOUS at 14:19

## 2022-02-04 RX ADMIN — DEXTROSE AND SODIUM CHLORIDE 75 ML/HR: 5; .45 INJECTION, SOLUTION INTRAVENOUS at 16:27

## 2022-02-04 RX ADMIN — PROPOFOL 80 MG: 10 INJECTION, EMULSION INTRAVENOUS at 14:19

## 2022-02-04 RX ADMIN — MECLIZINE 12.5 MG: 12.5 TABLET ORAL at 09:28

## 2022-02-04 RX ADMIN — ATORVASTATIN CALCIUM 40 MG: 40 TABLET, FILM COATED ORAL at 09:28

## 2022-02-04 RX ADMIN — REMDESIVIR 100 MG: 100 INJECTION, POWDER, LYOPHILIZED, FOR SOLUTION INTRAVENOUS at 17:40

## 2022-02-04 RX ADMIN — DEXAMETHASONE SODIUM PHOSPHATE 6 MG: 4 INJECTION, SOLUTION INTRAMUSCULAR; INTRAVENOUS at 17:40

## 2022-02-04 RX ADMIN — OLANZAPINE 7.5 MG: 5 TABLET, FILM COATED ORAL at 22:26

## 2022-02-04 NOTE — ASSESSMENT & PLAN NOTE
· Initially did not require any oxygen on admission; but became hypoxia and now on 2L NC - titrate off O2 to keep SpO2 greater than 90%  · Admitted under mild pathway   · Due to her comorbidity, started on remdesivir day #5/5  · Added dexamethasone 6 mg daily on 2/2/22- day #3/10  · D-dimers- 1 2- continue on heparin 5000 units t i d   · Will hold off on initiating baricitinib   · CT abdomen and pelvis- without evidence of pneumonia  · Trend inflammatory markers   · Incentive spirometry, prone/side-lying position, cough and deep breathing

## 2022-02-04 NOTE — ASSESSMENT & PLAN NOTE
· Likely due to hypovolemia hyponatremia with recent vomiting  · Continue gentle IVF   · Monitor Na level

## 2022-02-04 NOTE — PLAN OF CARE
Problem: MOBILITY - ADULT  Goal: Maintain or return to baseline ADL function  Description: INTERVENTIONS:  -  Assess patient's ability to carry out ADLs; assess patient's baseline for ADL function and identify physical deficits which impact ability to perform ADLs (bathing, care of mouth/teeth, toileting, grooming, dressing, etc )  - Assess/evaluate cause of self-care deficits   - Assess range of motion  - Assess patient's mobility; develop plan if impaired  - Assess patient's need for assistive devices and provide as appropriate  - Encourage maximum independence but intervene and supervise when necessary  - Involve family in performance of ADLs  - Assess for home care needs following discharge   - Consider OT consult to assist with ADL evaluation and planning for discharge  - Provide patient education as appropriate  Outcome: Progressing  Goal: Maintains/Returns to pre admission functional level  Description: INTERVENTIONS:  - Perform BMAT or MOVE assessment daily    - Set and communicate daily mobility goal to care team and patient/family/caregiver  - Collaborate with rehabilitation services on mobility goals if consulted  - Reposition patient every 2 hours    - Dangle patient 3 times a day  - Stand patient 3 times a day  - Ambulate patient 3 times a day  - Out of bed to chair 3 times a day   - Out of bed for [id number] a day  - Out of bed for toileting  - Record patient progress and toleration of activity level   Outcome: Progressing     Problem: Prexisting or High Potential for Compromised Skin Integrity  Goal: Skin integrity is maintained or improved  Description: INTERVENTIONS:  - Identify patients at risk for skin breakdown  - Assess and monitor skin integrity  - Assess and monitor nutrition and hydration status  - Monitor labs   - Assess for incontinence   - Turn and reposition patient  - Assist with mobility/ambulation  - Relieve pressure over bony prominences  - Avoid friction and shearing  - Provide appropriate hygiene as needed including keeping skin clean and dry  - Evaluate need for skin moisturizer/barrier cream  - Collaborate with interdisciplinary team   - Patient/family teaching  - Consider wound care consult   Outcome: Progressing     Problem: PAIN - ADULT  Goal: Verbalizes/displays adequate comfort level or baseline comfort level  Description: Interventions:  - Encourage patient to monitor pain and request assistance  - Assess pain using appropriate pain scale  - Administer analgesics based on type and severity of pain and evaluate response  - Implement non-pharmacological measures as appropriate and evaluate response  - Consider cultural and social influences on pain and pain management  - Notify physician/advanced practitioner if interventions unsuccessful or patient reports new pain  Outcome: Progressing     Problem: INFECTION - ADULT  Goal: Absence or prevention of progression during hospitalization  Description: INTERVENTIONS:  - Assess and monitor for signs and symptoms of infection  - Monitor lab/diagnostic results  - Monitor all insertion sites, i e  indwelling lines, tubes, and drains  - Monitor endotracheal if appropriate and nasal secretions for changes in amount and color  - [place] appropriate cooling/warming therapies per order  - Administer medications as ordered  - Instruct and encourage patient and family to use good hand hygiene technique  - Identify and instruct in appropriate isolation precautions for identified infection/condition  Outcome: Progressing  Goal: Absence of fever/infection during neutropenic period  Description: INTERVENTIONS:  - Monitor WBC    Outcome: Progressing     Problem: SAFETY ADULT  Goal: Patient will remain free of falls  Description: INTERVENTIONS:  - Educate patient/family on patient safety including physical limitations  - Instruct patient to call for assistance with activity   - Consult OT/PT to assist with strengthening/mobility   - Keep Call bell within reach  - Keep bed low and locked with side rails adjusted as appropriate  - Keep care items and personal belongings within reach  - Initiate and maintain comfort rounds  - Make Fall Risk Sign visible to staff  - Offer Toileting every 2 Hours, in advance of need  - Initiate/Maintain bed alarm  - Apply yellow socks and bracelet for high fall risk patients  - Consider moving patient to room near nurses station  Outcome: Progressing     Problem: DISCHARGE PLANNING  Goal: Discharge to home or other facility with appropriate resources  Description: INTERVENTIONS:  - Identify barriers to discharge w/patient and caregiver  - Arrange for needed discharge resources and transportation as appropriate  - Identify discharge learning needs (meds, wound care, etc )  - Arrange for interpretive services to assist at discharge as needed  - Refer to Case Management Department for coordinating discharge planning if the patient needs post-hospital services based on physician/advanced practitioner order or complex needs related to functional status, cognitive ability, or social support system  Outcome: Progressing     Problem: Knowledge Deficit  Goal: Patient/family/caregiver demonstrates understanding of disease process, treatment plan, medications, and discharge instructions  Description: Complete learning assessment and assess knowledge base    Interventions:  - Provide teaching at level of understanding  - Provide teaching via preferred learning methods  Outcome: Progressing     Problem: Potential for Falls  Goal: Patient will remain free of falls  Description: INTERVENTIONS:  - Educate patient/family on patient safety including physical limitations  - Instruct patient to call for assistance with activity   - Consult OT/PT to assist with strengthening/mobility   - Keep Call bell within reach  - Keep bed low and locked with side rails adjusted as appropriate  - Keep care items and personal belongings within reach  - Consider moving patient to room near nurses station  Outcome: Progressing

## 2022-02-04 NOTE — ASSESSMENT & PLAN NOTE
· With increasing urinary frequency  · The patient received 3 days of ceftriaxone  · This is ruled out

## 2022-02-04 NOTE — PROGRESS NOTES
Progress Note - General Surgery   Raissa Monet 77 y o  female MRN: [id number]  Unit/Bed#: -01 Encounter: [id number]    Assessment:  Alert, comfortable with flatus and liquid stool after Mirilax prep  Abdomen softer, but remains moderately distended  Minimal discomfort to deep palpation in LLQ, no peritoneal signs  Bowel sounds audible with Borborygmi  Plan:  Await follow up flex sig later today  Patient would be accepting of a diverting colostomy if necessary  Subjective/Objective   Chief Complaint: "Feeling better"    Subjective: Alert, pleasant, appears comfortable    Objective:     Blood pressure 156/73, pulse 60, temperature 98 6 °F (37 °C), resp  rate 17, height 5' 6" (1 676 m), weight 92 5 kg (203 lb 14 8 oz), SpO2 96 %  ,Body mass index is 32 91 kg/m²  No intake or output data in the 24 hours ending 02/04/22 1143    Invasive Devices  Report    Peripheral Intravenous Line            Peripheral IV 02/03/22 Dorsal (posterior); Right Wrist 1 day          Drain            External Urinary Catheter 3 days                Physical Exam:   Breathing comfortably with minimal supplemental Oxygen via NC  Abd:  Obese, moderately distended, tympanic to percussion, minimal tenderness to deep palpation in LLQ  Bowel sounds audible, with Borborygmi noted       Lab, Imaging and other studies:  CBC:   Lab Results   Component Value Date    WBC 12 32 (H) 02/04/2022    HGB 11 9 02/04/2022    HCT 36 3 02/04/2022    MCV 85 02/04/2022     02/04/2022    MCH 27 9 02/04/2022    MCHC 32 8 02/04/2022    RDW 14 8 02/04/2022    MPV 9 0 02/04/2022   , CMP:   Lab Results   Component Value Date    SODIUM 132 (L) 02/04/2022    K 4 6 02/04/2022    CL 97 02/04/2022    CO2 29 02/04/2022    BUN 14 02/04/2022    CREATININE 0 53 (L) 02/04/2022    CALCIUM 8 9 02/04/2022    EGFR 93 02/04/2022     VTE Pharmacologic Prophylaxis: Heparin  VTE Mechanical Prophylaxis: sequential compression device

## 2022-02-04 NOTE — APP STUDENT NOTE
S:      CC: generalized weakness     HPI: Patient is a 77year old female who presented to the ED 1/31 from personal care home with generalized weakness and urinary frequency, and nausea that developed after her lunch with emesis x1  She denied any history of fever or chills, cough or cold symptoms  She was incidentally found to be COVID positive in the ED, and was not short of breath or requiring oxygen at that point  A CT scan of her abdomen and pelvis revealed suspected colonic ileus and distended distal esophagus  Urinalysis was negative on 1/31  She was admitted for further management of her ileus, also COVID positive so admitted to med surg on mild COVID pathway      O:   Vitals: 98 2-60-/73,  96% at rest on oxygen at 2L nc     Physical exam: Patient is pleasant and talkative, denies any pain or discomfort  Has been NPO since midnight for flexible sigmoidoscopy today  Abdomen is soft and non-tender +BSx4  Incontinent of a large amount of liquid brown stool at this time  Denies any feeling of nausea and was tolerating her clear liquid diet prior to NPO after midnight without difficulty      Assessment/Plan:     1  Ileus:  -Patient presented to the ED 1/31 with weakness  Resident of [facility], poor historian at baseline  Did have an episode of non blood emesis at [facility] prior to arrival to the ED   -1/31 CT abdomen and pelvis with contrast revealed severe distention of colon questionable ileus and distended distal esophagus  -GI consulted and following patient  - 2/1 had flexible sigmoidoscopy which showed retained stool despite tap water enema x2  There was a area of ulceration that was biopsied  -2/1 EGD revealed hiatal hernia, normal esophagus and duodenum  -patient will have a repeat flex sig today  -surgery also consulted and following patient  -has been tolerating clear liquid diet  -continue D5 1/2 NS at 75/hr (decreased yesterday was 100/hr)  -Patient has been incontinent of multiple loose stools     2  COVID 23  -Patient is a resident of [facility] poor historian, presented with weakness, urinary frequency and one episode of non bloody emesis on 1/31  -1/31/22 tested positive for COVID  -did not require oxygen on arrival to ER, was admitted under mild covid pathway  - as of yesterday oxygen requirements increased to the point where she required 3-4L, today she is on 2L and 96% SaO2 on same  -monitor CBC daily  Component Ref Range & Units 2/4/22 1104 2/3/22 0426 2/1/22 0432 1/31/22 1451   WBC 4 31 - 10 16 Thousand/uL 12 32 High   13 80 High   7 00  8 63    RBC 3 81 - 5 12 Million/uL 4 27  4 54  4 18  4 00    Hemoglobin 11 5 - 15 4 g/dL 11 9  12 5  11 7  11 1 Low     Hematocrit 34 8 - 46 1 % 36 3  39 5  35 8  34 9    MCV 82 - 98 fL 85  87  86  87    MCH 26 8 - 34 3 pg 27 9  27 5  28 0  27 8    MCHC 31 4 - 37 4 g/dL 32 8  31 6  32 7  31 8    RDW 11 6 - 15 1 % 14 8  14 7  14 6  14 7    Platelets 615 - 926 Thousands/uL 249  278  223  227    MPV 8 9 - 12 7 fL 9 0  9 4  9 7  9 4      -monitor chemistry daily, chem from this am pending, multiple nurses tried to get blood on patient  -1/31 troponin x1 was 5  -1/31   -1/31 D dimer 1 20  -continue heparin 5000 units SQ every 8 hours for prophylaxis  -CRP trending down on 1/31 34 7, 2/1 was 30 3  -1/31 UA negative  -1/31 lactic acid 0 5  -encourage cough and deep breathing  -administer oxygen to keep SaO2 greater than or equal to 90%, continue to wean as tolerates  -encourage mobilization OOB to chair as tolerated   -PT/OT are following patient  -encourage to lay prone   -continue remdesivir day day 5/5  -continue decadron 6mg IV every 24 hours day 3/10    3   Hypoglycemia   -2/2 patient had hypoglycemic episode with blood sugar of 48 had amp dextrose improved to 104    -patient was NPO for flex sig possibly contributing factoe  -continue to monitor accuchecks every 6 hours  -monitor for S/S hypoglycemia  -monitor chemistries, results today pending  -follow hypoglycemia protocol  -continue D5 1/2 NS at 75/hr  -NPO since midnight for flex sig today, was tolerating clears prior    4  Hyponatremia  -felt probably a volume issue, patient wit poor PO intake and nausea prior to arrival  -continue to monitor chemistries, results today pending  continue hydration with IVF D5 1/2NS at 75/hr  -NPo since midnight for flex sig  Was tolerating clears prior  -monitor I/O     Plan: Patient is a resident of [place] [id number]   Return to [place] [address] when stable for discharge

## 2022-02-04 NOTE — ASSESSMENT & PLAN NOTE
· Patient was NPO   · She has not been drinking very well  · Started on clear liquid diet  · Resolved   · Hypoglycemia protocol  · 2/3 The patient received half an amp D50 for blood glucose of 48

## 2022-02-04 NOTE — CASE MANAGEMENT
Case Management Discharge Planning Note    Patient name Edilson Woody  Location [address] [phone]/-[id number] MRN [id number]  : 1945 Date 2022       Current Admission Date: 2022  Current Admission Diagnosis:Ileus [facility])   Patient Active Problem List    Diagnosis Date Noted    Hypoglycemia 2022    [facility] 20[facility]    [facility]'s [facility] 2022    Primary hypertension 2022    Urinary incontinence 2022    Acquired hypothyroidism 2022    COVID-19 2022    [facility]us [facility] 2022    Hyponatremia 2022    Seizure disorder ([facility] ) 2022      LOS (days): 3  Geometric Mean LOS (GMLOS) (days): 4 70  Days to GMLOS:1 6     OBJECTIVE:  Risk of Unplanned Readmission Score: 15         Current admission status: Inpatient   Preferred Pharmacy: No Pharmacies Listed  Primary Care Provider: No primary care provider on file  Primary Insurance: MEDICARE  Secondary Insurance[place] [employer] [place]    DISCHARGE DETAILS:       Freedom of Choice: Yes  Comments - Freedom of Choice: Pt not yet stable for discharge per SLIM and surgery  Anticipate pt requiring inpatient treatment through the weekend  Discharge plan remains for pt to return to [address] with [facility]  Dee ambrose [facility] and pt Keila Horne updated on plan of care  They voiced agreement and understanding  CM to follow

## 2022-02-04 NOTE — PLAN OF CARE
Problem: MOBILITY - ADULT  Goal: Maintain or return to baseline ADL function  Description: INTERVENTIONS:  -  Assess patient's ability to carry out ADLs; assess patient's baseline for ADL function and identify physical deficits which impact ability to perform ADLs (bathing, care of mouth/teeth, toileting, grooming, dressing, etc )  - Assess/evaluate cause of self-care deficits   - Assess range of motion  - Assess patient's mobility; develop plan if impaired  - Assess patient's need for assistive devices and provide as appropriate  - Encourage maximum independence but intervene and supervise when necessary  - Involve family in performance of ADLs  - Assess for home care needs following discharge   - Consider OT consult to assist with ADL evaluation and planning for discharge  - Provide patient education as appropriate  Outcome: Progressing  Goal: Maintains/Returns to pre admission functional level  Description: INTERVENTIONS:  - Perform BMAT or MOVE assessment daily    - Set and communicate daily mobility goal to care team and patient/family/caregiver  - Collaborate with rehabilitation services on mobility goals if consulted  - Perform Range of Motion 3 times a day  - Reposition patient every 2 hours    - Dangle patient 3 times a day  - Stand patient 3 times a day  - Ambulate patient 3 times a day  - Out of bed to chair 3 times a day   - Out of bed for meals 3 times a day  - Out of bed for toileting  - Record patient progress and toleration of activity level   Outcome: Progressing     Problem: Prexisting or High Potential for Compromised Skin Integrity  Goal: Skin integrity is maintained or improved  Description: INTERVENTIONS:  - Identify patients at risk for skin breakdown  - Assess and monitor skin integrity  - Assess and monitor nutrition and hydration status  - Monitor labs   - Assess for incontinence   - Turn and reposition patient  - Assist with mobility/ambulation  - Relieve pressure over bony prominences  - Avoid friction and shearing  - Provide appropriate hygiene as needed including keeping skin clean and dry  - Evaluate need for skin moisturizer/barrier cream  - Collaborate with interdisciplinary team   - Patient/family teaching  - Consider wound care consult   Outcome: Progressing     Problem: PAIN - ADULT  Goal: Verbalizes/displays adequate comfort level or baseline comfort level  Description: Interventions:  - Encourage patient to monitor pain and request assistance  - Assess pain using appropriate pain scale  - Administer analgesics based on type and severity of pain and evaluate response  - Implement non-pharmacological measures as appropriate and evaluate response  - Consider cultural and social influences on pain and pain management  - Notify physician/advanced practitioner if interventions unsuccessful or patient reports new pain  Outcome: Progressing     Problem: INFECTION - ADULT  Goal: Absence or prevention of progression during hospitalization  Description: INTERVENTIONS:  - Assess and monitor for signs and symptoms of infection  - Monitor lab/diagnostic results  - Monitor all insertion sites, i e  indwelling lines, tubes, and drains  - Monitor endotracheal if appropriate and nasal secretions for changes in amount and color  - [place] appropriate cooling/warming therapies per order  - Administer medications as ordered  - Instruct and encourage patient and family to use good hand hygiene technique  - Identify and instruct in appropriate isolation precautions for identified infection/condition  Outcome: Progressing  Goal: Absence of fever/infection during neutropenic period  Description: INTERVENTIONS:  - Monitor WBC    Outcome: Progressing     Problem: SAFETY ADULT  Goal: Patient will remain free of falls  Description: INTERVENTIONS:  - Educate patient/family on patient safety including physical limitations  - Instruct patient to call for assistance with activity   - Consult OT/PT to assist with strengthening/mobility   - Keep Call bell within reach  - Keep bed low and locked with side rails adjusted as appropriate  - Keep care items and personal belongings within reach  - Initiate and maintain comfort rounds  - Make Fall Risk Sign visible to staff  - Offer Toileting every 2 Hours, in advance of need  - Initiate/Maintain bed alarm  - Obtain necessary fall risk management equipment: walker   - Apply yellow socks and bracelet for high fall risk patients  - Consider moving patient to room near nurses station  Outcome: Progressing     Problem: DISCHARGE PLANNING  Goal: Discharge to home or other facility with appropriate resources  Description: INTERVENTIONS:  - Identify barriers to discharge w/patient and caregiver  - Arrange for needed discharge resources and transportation as appropriate  - Identify discharge learning needs (meds, wound care, etc )  - Arrange for interpretive services to assist at discharge as needed  - Refer to Case Management Department for coordinating discharge planning if the patient needs post-hospital services based on physician/advanced practitioner order or complex needs related to functional status, cognitive ability, or social support system  Outcome: Progressing     Problem: Knowledge Deficit  Goal: Patient/family/caregiver demonstrates understanding of disease process, treatment plan, medications, and discharge instructions  Description: Complete learning assessment and assess knowledge base    Interventions:  - Provide teaching at level of understanding  - Provide teaching via preferred learning methods  Outcome: Progressing     Problem: Potential for Falls  Goal: Patient will remain free of falls  Description: INTERVENTIONS:  - Educate patient/family on patient safety including physical limitations  - Instruct patient to call for assistance with activity   - Consult OT/PT to assist with strengthening/mobility   - Keep Call bell within reach  - Keep bed low and locked with side rails adjusted as appropriate  - Keep care items and personal belongings within reach  - Initiate and maintain comfort rounds  - Make Fall Risk Sign visible to staff  - Offer Toileting every 2 Hours, in advance of need  - Initiate/Maintain bed alarm  - Obtain necessary fall risk management equipment: walker   - Apply yellow socks and bracelet for high fall risk patients  - Consider moving patient to room near nurses station  Outcome: Progressing

## 2022-02-04 NOTE — PROGRESS NOTES
[facility] [address]  Progress Note Janny Leroy 1945, 77 y o  female MRN: [id number]  Unit/Bed#: -01 Encounter: [id number]  Primary Care Provider: No primary care provider on file  Date and time admitted to hospital: 1/31/2022  1:56 PM    * Ileus [facility])  Assessment & Plan  · As evident on CT a/p with severe distension of the colon with gas and stool with a rub transition at the proximal sigmoid  Suspected this could be related to a colonic ileus  Distal esophagus is distended with material    · Gastroenterology input appreciated-  · Underwent EGD and flexible sigmoidoscopy 2/1- unable to visualize due to poor bowel prep  Area of ulceration noted  Biopsy pending  · 2/4 repeat flexible sigmoidoscopy today   · Surgery input appreciated   · As the patient does not any peritoneal signs, recommend to continue with MiraLax    Monitor closely as she is at risk for stercoral perforation which will require urgent laparotomy with resection/diversion of sigmoid colon    · IV fluid  · Continue with serial abdominal exam  · Supportive care    COVID-19  Assessment & Plan  · Initially did not require any oxygen on admission; but became hypoxia and now on 2L NC - titrate off O2 to keep SpO2 greater than 90%  · Admitted under mild pathway   · Due to her comorbidity, started on remdesivir day #5/5  · Added dexamethasone 6 mg daily on 2/2/22- day #3/10  · D-dimers- 1 2- continue on heparin 5000 units t i d   · Will hold off on initiating baricitinib   · CT abdomen and pelvis- without evidence of pneumonia  · Trend inflammatory markers   · Incentive spirometry, prone/side-lying position, cough and deep breathing    Suspected UTI  Assessment & Plan  · With increasing urinary frequency  · The patient received 3 days of ceftriaxone  · This is ruled out       Hypoglycemia  Assessment & Plan  · Patient was NPO   · She has not been drinking very well  · Started on clear liquid diet  · Resolved   · Hypoglycemia protocol  · 2/3 The patient received half an amp D50 for blood glucose of 48      Seizure disorder (HCC)  Assessment & Plan  · Valproic acid level-60  · Continue with home regimen        Hyponatremia  Assessment & Plan  · Likely due to hypovolemia hyponatremia with recent vomiting  · Continue gentle IVF   · Monitor Na level      Acquired hypothyroidism  Assessment & Plan  · TSH - 5 136  · Continue with levothyroxine       Primary hypertension  Assessment & Plan  · Monitor BP closely    · Continue with lisinopril, propranolol and amlodipine      Parkinson's disease (HCC)  Assessment & Plan  · Continue sinemet   · PT/OT evaluation         Other schizophrenia ([facility] )  Assessment & Plan  · Continue with home regimen          VTE Prophylaxis:  Heparin    Patient Centered Rounds: I have performed bedside rounds with nursing staff today  Discussions with Specialists or Other Care Team Provider: GI, surgery   Education and Discussions with Family / Patient: patient and POA via phone     Current Length of Stay: 3 day(s)    Current Patient Status: Inpatient   Certification Statement: The patient will continue to require additional inpatient hospital stay due to ileus    Discharge Plan: pending hospital course     Code Status: Level 1 - Full Code    Subjective:   Feeling okay  Denies any pain  Objective:     Vitals:   No data recorded  HR:  [60-61] 60  Resp:  [17-20] 17  BP: (144-156)/(73-80) 156/73  SpO2:  [93 %-96 %] 96 %  Body mass index is 32 91 kg/m²  Input and Output Summary (last 24 hours):     No intake or output data in the 24 hours ending 02/04/22 1422    Physical Exam:   Physical Exam  Vitals and nursing note reviewed  Constitutional:       General: She is not in acute distress  Appearance: Normal appearance  HENT:      Head: Normocephalic and atraumatic  Right Ear: External ear normal       Left Ear: External ear normal       Nose: Nose normal  No rhinorrhea        Mouth/Throat:      Mouth: Mucous membranes are moist       Pharynx: Oropharynx is clear  Eyes:      General:         Right eye: No discharge  Left eye: No discharge  Pupils: Pupils are equal, round, and reactive to light  Cardiovascular:      Rate and Rhythm: Normal rate and regular rhythm  Pulses: Normal pulses  Heart sounds: Normal heart sounds  No murmur heard  Pulmonary:      Effort: Pulmonary effort is normal  No respiratory distress  Breath sounds: Normal breath sounds  Abdominal:      General: Bowel sounds are normal  There is no distension  Palpations: Abdomen is soft  There is no mass  Tenderness: There is no abdominal tenderness  Musculoskeletal:         General: No swelling or tenderness  Normal range of motion  Cervical back: Normal range of motion and neck supple  No muscular tenderness  Skin:     General: Skin is warm and dry  Capillary Refill: Capillary refill takes less than 2 seconds  Findings: No erythema or rash  Neurological:      General: No focal deficit present  Mental Status: She is alert and oriented to person, place, and time  Mental status is at baseline  Psychiatric:         Mood and Affect: Mood normal          Behavior: Behavior normal          Thought Content: Thought content normal          Additional Data:     Labs:    Results from last 7 days   Lab Units 02/04/22  1104 02/03/22 0426 02/03/22  0426   WBC Thousand/uL 12 32*   < > 13 80*   HEMOGLOBIN g/dL 11 9   < > 12 5   HEMATOCRIT % 36 3   < > 39 5   PLATELETS Thousands/uL 249   < > 278   NEUTROS PCT %  --   --  83*   LYMPHS PCT %  --   --  10*   MONOS PCT %  --   --  6   EOS PCT %  --   --  0    < > = values in this interval not displayed       Results from last 7 days   Lab Units 02/04/22  1104 02/03/22  0426 02/03/22  0426   SODIUM mmol/L 132*   < > 131*   POTASSIUM mmol/L 4 6   < > 4 9   CHLORIDE mmol/L 97   < > 97   CO2 mmol/L 29   < > 26   BUN mg/dL 14   < > 26*   CREATININE mg/dL 0 53*   < > 0 64   CALCIUM mg/dL 8 9   < > 9 1   ALK PHOS U/L  --   --  70   ALT U/L  --   --  <3*   AST U/L  --   --  14    < > = values in this interval not displayed  Results from last 7 days   Lab Units 01/31/22  1451   INR  0 99     Results from last 7 days   Lab Units 02/04/22  1126 02/04/22  0638 02/04/22  0026 02/03/22  2022 02/03/22  1532 02/03/22  1101 02/03/22  0632 02/02/22  1937 02/02/22  1501 02/02/22  1210 02/02/22  1126   POC GLUCOSE mg/dl 116 172* 165* 131 149* 171* 185* 192* 91 104 48*           * I Have Reviewed All Lab Data Listed Above  * Additional Pertinent Lab Tests Reviewed:  Mynor Godoy Admission  Reviewed    Imaging:  Imaging Reports Reviewed Today Include: n/a     Recent Cultures (last 7 days):           Last 24 Hours Medication List:   Current Facility-Administered Medications   Medication Dose Route Frequency Provider Last Rate    acetaminophen  650 mg Oral Q6H PRN Tay Caitie, CRNP      amLODIPine  10 mg Oral Daily Tay Caitie, CRNP      atorvastatin  40 mg Oral Daily Tay Caitie, CRNP      carbidopa-levodopa  1 tablet Oral BID Tay Caitie, CRNP      dexamethasone  6 mg Intravenous Q24H Tay Caitie, CRNP      dextrose 5 % and sodium chloride 0 45 %  75 mL/hr Intravenous Continuous Tay Caitie, CRNP 75 mL/hr (02/04/22 1413)    divalproex sodium  500 mg Oral HS Tay Caitie, CRNP      divalproex sodium  250 mg Oral Daily Tay Caitie, CRNP      gabapentin  100 mg Oral TID Tay Caitie, CRNP      heparin (porcine)  5,000 Units Subcutaneous [facility] Tay Caitie, CRNP      levothyroxine  100 mcg Oral Early Morning Tay Caitie, CRNP      meclizine  12 5 mg Oral Daily Tay Caitie, CRNP      OLANZapine  7 5 mg Oral HS Tay Caitie, CRNP      ondansetron  4 mg Intravenous Once PRN Manish Orozco MD      propranolol  10 mg Oral BID Tay Caitie, CRNP      remdesivir  100 mg Intravenous Q24H ARTHUR Henry 100 mg (02/02/22 7051)    zolpidem  5 mg Oral HS ARTHUR Henry          Today, Patient Was Seen By: ARTHUR Henry    ** Please Note: Dictation voice to text software may have been used in the creation of this document   **

## 2022-02-04 NOTE — ANESTHESIA POSTPROCEDURE EVALUATION
Post-Op Assessment Note    CV Status:  Stable  Pain Score: 0    Pain management: adequate     Mental Status:  Sleepy   Hydration Status:  Stable   PONV Controlled:  None   Airway Patency:  Patent      Post Op Vitals Reviewed: Yes      Staff: CRNA         No complications documented      BP   101/63   Temp      Pulse  52   Resp   12   SpO2   99

## 2022-02-04 NOTE — ASSESSMENT & PLAN NOTE
· As evident on CT a/p with severe distension of the colon with gas and stool with a rub transition at the proximal sigmoid  Suspected this could be related to a colonic ileus  Distal esophagus is distended with material    · Gastroenterology input appreciated-  · Underwent EGD and flexible sigmoidoscopy 2/1- unable to visualize due to poor bowel prep  Area of ulceration noted  Biopsy pending  · 2/4 repeat flexible sigmoidoscopy today   · Surgery input appreciated   · As the patient does not any peritoneal signs, recommend to continue with MiraLax    Monitor closely as she is at risk for stercoral perforation which will require urgent laparotomy with resection/diversion of sigmoid colon    · IV fluid  · Continue with serial abdominal exam  · Supportive care

## 2022-02-05 LAB
ANION GAP SERPL CALCULATED.3IONS-SCNC: 5 MMOL/L (ref 4–13)
BUN SERPL-MCNC: 14 MG/DL (ref 5–25)
CALCIUM SERPL-MCNC: 8.8 MG/DL (ref 8.4–10.2)
CHLORIDE SERPL-SCNC: 97 MMOL/L (ref 96–108)
CO2 SERPL-SCNC: 30 MMOL/L (ref 21–32)
CREAT SERPL-MCNC: 0.6 MG/DL (ref 0.6–1.3)
ERYTHROCYTE [DISTWIDTH] IN BLOOD BY AUTOMATED COUNT: 15 % (ref 11.6–15.1)
GFR SERPL CREATININE-BSD FRML MDRD: 89 ML/MIN/1.73SQ M
GLUCOSE SERPL-MCNC: 112 MG/DL (ref 65–140)
GLUCOSE SERPL-MCNC: 139 MG/DL (ref 65–140)
GLUCOSE SERPL-MCNC: 186 MG/DL (ref 65–140)
HCT VFR BLD AUTO: 35.2 % (ref 34.8–46.1)
HGB BLD-MCNC: 11.7 G/DL (ref 11.5–15.4)
MCH RBC QN AUTO: 28.4 PG (ref 26.8–34.3)
MCHC RBC AUTO-ENTMCNC: 33.2 G/DL (ref 31.4–37.4)
MCV RBC AUTO: 85 FL (ref 82–98)
PLATELET # BLD AUTO: 226 THOUSANDS/UL (ref 149–390)
PMV BLD AUTO: 9 FL (ref 8.9–12.7)
POTASSIUM SERPL-SCNC: 4.7 MMOL/L (ref 3.5–5.3)
RBC # BLD AUTO: 4.12 MILLION/UL (ref 3.81–5.12)
SODIUM SERPL-SCNC: 132 MMOL/L (ref 135–147)
WBC # BLD AUTO: 8.87 THOUSAND/UL (ref 4.31–10.16)

## 2022-02-05 PROCEDURE — 99232 SBSQ HOSP IP/OBS MODERATE 35: CPT | Performed by: NURSE PRACTITIONER

## 2022-02-05 PROCEDURE — 80048 BASIC METABOLIC PNL TOTAL CA: CPT | Performed by: NURSE PRACTITIONER

## 2022-02-05 PROCEDURE — 99232 SBSQ HOSP IP/OBS MODERATE 35: CPT | Performed by: PHYSICIAN ASSISTANT

## 2022-02-05 PROCEDURE — 99232 SBSQ HOSP IP/OBS MODERATE 35: CPT | Performed by: INTERNAL MEDICINE

## 2022-02-05 PROCEDURE — 85027 COMPLETE CBC AUTOMATED: CPT | Performed by: NURSE PRACTITIONER

## 2022-02-05 PROCEDURE — 82948 REAGENT STRIP/BLOOD GLUCOSE: CPT

## 2022-02-05 RX ORDER — PREDNISONE 20 MG/1
20 TABLET ORAL DAILY
Status: COMPLETED | OUTPATIENT
Start: 2022-02-07 | End: 2022-02-07

## 2022-02-05 RX ORDER — PREDNISONE 10 MG/1
10 TABLET ORAL DAILY
Status: COMPLETED | OUTPATIENT
Start: 2022-02-08 | End: 2022-02-08

## 2022-02-05 RX ORDER — PREDNISONE 20 MG/1
40 TABLET ORAL DAILY
Status: COMPLETED | OUTPATIENT
Start: 2022-02-06 | End: 2022-02-06

## 2022-02-05 RX ADMIN — HEPARIN SODIUM 5000 UNITS: 5000 INJECTION INTRAVENOUS; SUBCUTANEOUS at 06:13

## 2022-02-05 RX ADMIN — DEXTROSE AND SODIUM CHLORIDE 75 ML/HR: 5; .45 INJECTION, SOLUTION INTRAVENOUS at 06:12

## 2022-02-05 RX ADMIN — ACETAMINOPHEN 650 MG: 325 TABLET ORAL at 10:09

## 2022-02-05 RX ADMIN — PROPRANOLOL HYDROCHLORIDE 10 MG: 10 TABLET ORAL at 08:02

## 2022-02-05 RX ADMIN — DIVALPROEX SODIUM 250 MG: 250 TABLET, DELAYED RELEASE ORAL at 08:03

## 2022-02-05 RX ADMIN — MECLIZINE 12.5 MG: 12.5 TABLET ORAL at 08:02

## 2022-02-05 RX ADMIN — PROPRANOLOL HYDROCHLORIDE 10 MG: 10 TABLET ORAL at 18:17

## 2022-02-05 RX ADMIN — DIVALPROEX SODIUM 500 MG: 500 TABLET, EXTENDED RELEASE ORAL at 22:55

## 2022-02-05 RX ADMIN — POLYETHYLENE GLYCOL 3350 17 G: 17 POWDER, FOR SOLUTION ORAL at 23:06

## 2022-02-05 RX ADMIN — HEPARIN SODIUM 5000 UNITS: 5000 INJECTION INTRAVENOUS; SUBCUTANEOUS at 18:17

## 2022-02-05 RX ADMIN — CARBIDOPA AND LEVODOPA 1 TABLET: 25; 100 TABLET ORAL at 08:03

## 2022-02-05 RX ADMIN — POLYETHYLENE GLYCOL 3350 17 G: 17 POWDER, FOR SOLUTION ORAL at 08:03

## 2022-02-05 RX ADMIN — CARBIDOPA AND LEVODOPA 1 TABLET: 25; 100 TABLET ORAL at 23:06

## 2022-02-05 RX ADMIN — GABAPENTIN 100 MG: 100 CAPSULE ORAL at 08:03

## 2022-02-05 RX ADMIN — ATORVASTATIN CALCIUM 40 MG: 40 TABLET, FILM COATED ORAL at 08:02

## 2022-02-05 RX ADMIN — GABAPENTIN 100 MG: 100 CAPSULE ORAL at 18:18

## 2022-02-05 RX ADMIN — ZOLPIDEM TARTRATE 5 MG: 5 TABLET ORAL at 22:55

## 2022-02-05 RX ADMIN — OLANZAPINE 7.5 MG: 5 TABLET, FILM COATED ORAL at 22:55

## 2022-02-05 RX ADMIN — LEVOTHYROXINE SODIUM 100 MCG: 100 TABLET ORAL at 06:13

## 2022-02-05 RX ADMIN — GABAPENTIN 100 MG: 100 CAPSULE ORAL at 23:06

## 2022-02-05 RX ADMIN — HEPARIN SODIUM 5000 UNITS: 5000 INJECTION INTRAVENOUS; SUBCUTANEOUS at 22:55

## 2022-02-05 RX ADMIN — AMLODIPINE BESYLATE 10 MG: 10 TABLET ORAL at 08:03

## 2022-02-05 NOTE — PROGRESS NOTES
Progress Note- Arnold Jameson 77 y o  female MRN: [id number]    Unit/Bed#: -01 Encounter: [id number]      Assessment and Plan:    79-year-old female with hypothyroidism, Parkinson disease, schizophrenia presenting 1/30 due to generalized weakness and 1 episode of vomiting  CT abdomen pelvis demonstrates severe distention of the colon with abrupt transition the proximal sigmoid  Colonoscopy on 02/01 limited due to solid stool despite tap water enemas, but noted area of ulceration and erythematous mucosa  Biopsies taken of abnormal mucosa  Repeat flex sig yesterday using EGD scope advanced to 45 cm with significant angulation and luminal narrowing but no discrete mass or focal stricture identified  Unclear etiology for significant luminal narrowing  At this time would continue recommendation of aggressive bowel regimen and she should remain on MiraLax b i d  with goal of 1 soft bowel movement per day  She should be counseled that stool should be soft infrequent to avoid obstruction  Appreciate surgical recommendations  Agree at this time with continued conservative management   ______________________________________________________________________    Subjective:     No acute overnight events  She did have a bowel movement this morning    She has been on MiraLax b i d     Medication Administration - last 24 hours from 02/04/2022 1217 to 02/05/2022 1217       Date/Time Order Dose Route Action Action by     02/05/2022 0803 amLODIPine (NORVASC) tablet 10 mg 10 mg Oral Given Duane Campanile, RN     02/05/2022 0802 atorvastatin (LIPITOR) tablet 40 mg 40 mg Oral Given Duane Campanile, RN     02/05/2022 8247 carbidopa-levodopa (SINEMET)  mg per tablet 1 tablet 1 tablet Oral Given Duane Campanile, RN     02/04/2022 2227 carbidopa-levodopa (SINEMET)  mg per tablet 1 tablet 1 tablet Oral Given Olamide Mayen RN     02/04/2022 2227 divalproex sodium (DEPAKOTE ER) 24 hr tablet 500 mg 500 mg Oral Given Mendel Beal, RN     02/05/2022 0803 divalproex sodium (DEPAKOTE) EC tablet 250 mg 250 mg [facility]     02/05/2022 5735 gabapentin (NEURONTIN) capsule 100 mg 100 mg Oral Given Madolyn Rides, RN     02/04/2022 2227 gabapentin (NEURONTIN) capsule 100 mg 100 mg Oral Given Mendel Beal, RN     02/04/2022 1700 gabapentin (NEURONTIN) capsule 100 mg 100 mg Oral Given Bethany Hockey, RN     02/05/2022 5016 levothyroxine tablet 100 mcg 100 mcg Oral Given Mendel Beal, RN     02/05/2022 0802 meclizine (ANTIVERT) tablet 12 5 mg 12 5 mg Oral Given Madolyn Rides, RN     02/04/2022 2226 OLANZapine (ZyPREXA) tablet 7 5 mg 7 5 mg Oral Given Mendel Beal, RN     02/05/2022 0802 propranolol (INDERAL) tablet 10 mg 10 mg [facility]     02/04/2022 1740 propranolol (INDERAL) tablet 10 mg 10 mg Oral Given Bethany Hockey, RN     02/04/2022 2226 zolpidem (AMBIEN) tablet 5 mg 5 mg Oral Given Mendel Beal, RN     02/05/2022 1009 acetaminophen (TYLENOL) tablet 650 mg 650 mg Oral Given Madolyn Rides, RN     02/05/2022 1182 heparin (porcine) subcutaneous injection 5,000 Units 5,000 Units Subcutaneous Given Mendel Twyla, RN     02/04/2022 2227 heparin (porcine) subcutaneous injection 5,000 Units 5,000 Units Subcutaneous Given Mendel Beal, RN     02/04/2022 1358 heparin (porcine) subcutaneous injection 5,000 Units 0 Units Subcutaneous Hold Bethanye Hockey, RN     02/04/2022 1740 remdesivir (Veklury) 100 mg in sodium chloride 0 9 % 270 mL IVPB 100 mg Intravenous [address] [facility], RN     02/05/2022 0612 dextrose 5 % and sodium chloride 0 45 % infusion 75 mL/hr Intravenous [address] [facility]     02/04/2022 1627 dextrose 5 % and sodium chloride 0 45 % infusion 75 mL/hr Intravenous [address] Bethany Lopez, RN     02/04/2022 1413 dextrose 5 % and sodium chloride 0 45 % infusion   Intravenous Continue from Pre-op Karolina Camara, CRNA     02/04/2022 1403 dextrose 5 % and sodium chloride 0 45 % infusion 75 mL/hr Intravenous [address] Megan Barakat, ELIAN     02/04/2022 1740 dexamethasone (DECADRON) injection 6 mg 6 mg Intravenous Given Megan Barakat, ELIAN     02/05/2022 0803 polyethylene glycol (MIRALAX) packet 17 g 17 g Oral Given Otis Weathers RN     02/04/2022 2227 polyethylene glycol (MIRALAX) packet 17 g 17 g Oral Given Timothy Ram RN          Objective:     Vitals: Blood pressure (!) 174/98, pulse 64, temperature (!) 97 °F (36 1 °C), resp  rate 18, height 5' 6" (1 676 m), weight 92 5 kg (203 lb 14 8 oz), SpO2 97 %  ,Body mass index is 32 91 kg/m²  Intake/Output Summary (Last 24 hours) at 2/5/2022 1217  Last data filed at 2/4/2022 2330  Gross per 24 hour   Intake --   Output 1200 ml   Net -1200 ml       Physical Exam:   General Appearance: Awake and alert, in no acute distress  Abdomen: Soft, non-tender, non-distended; bowel sounds normal; no masses or no organomegaly    Invasive Devices  Report    Peripheral Intravenous Line            Peripheral IV 02/03/22 Dorsal (posterior); Right Wrist 2 days          Drain            External Urinary Catheter 4 days                Lab Results:  No results displayed because visit has over 200 results  Imaging Studies: I have personally reviewed pertinent imaging studies

## 2022-02-05 NOTE — PROGRESS NOTES
[address] 46  Progress Note Zaina Adams 1945, 77 y o  female MRN: [id number]  Unit/Bed#: -01 Encounter: [id number]  Primary Care Provider: No primary care provider on file  Date and time admitted to hospital: 1/31/2022  1:56 PM    * Ileus [facility])  Assessment & Plan  · As evident on CT a/p with severe distension of the colon with gas and stool with a rub transition at the proximal sigmoid  Suspected this could be related to a colonic ileus  Distal esophagus is distended with material    · Gastroenterology input appreciated-  · Underwent EGD and flexible sigmoidoscopy 2/1- unable to visualize due to poor bowel prep  Area of ulceration noted  Biopsy pending  · 2/4 underwent repeat flexible sigmoidoscopy- no mass or obvious stricture  · GI recommend to continue with MiraLax BID  Do not hold even with loose stool as there appears to be angulation and lumen narrowing in sigmoid colon  · Surgery input appreciated   · As the patient does not any peritoneal signs, recommend to continue with MiraLax  Monitor closely as she is at risk for stercoral perforation which will require urgent laparotomy with resection/diversion of sigmoid colon  Patient is clinically improving  No further surgical intervention indicated at this time  May follow up with surgery as needed outpatient       · Continue with serial abdominal exam  · Supportive care    COVID-19  Assessment & Plan  · Initially did not require any oxygen on admission; but became hypoxia and now on 2L NC - titrate off O2 to keep SpO2 greater than 90%  · Admitted under mild pathway   · Due to her comorbidity, started on remdesivir completed course on 2/4/22  · Added dexamethasone 6 mg daily on 2/2/22- day #4/10; patient does not require any O2 further will taper with PO prednisone   · D-dimers- 1 2- continue on heparin 5000 units t i d   · Will hold off on initiating baricitinib   · CT abdomen and pelvis- without evidence of pneumonia  · Trend inflammatory markers   · Incentive spirometry, prone/side-lying position, cough and deep breathing     Suspected UTI  Assessment & Plan  · With increasing urinary frequency  · The patient received 3 days of ceftriaxone  · This is ruled out        Hypoglycemia  Assessment & Plan  · Patient was NPO   · She has not been drinking very well  · Started on clear liquid diet  · Resolved   · Hypoglycemia protocol  · 2/3 The patient received half an amp D50 for blood glucose of 48       Seizure disorder (HCC)  Assessment & Plan  · Valproic acid level-60  · Continue with home regimen         Hyponatremia  Assessment & Plan  · Likely due to hypovolemia hyponatremia with recent vomiting  · Received IVF  · Monitor Na level      Acquired hypothyroidism  Assessment & Plan  · TSH - 5 136  · Continue with levothyroxine        Primary hypertension  Assessment & Plan  · Monitor BP closely    · Continue with lisinopril, propranolol and amlodipine       Parkinson's disease ([facility])  Assessment & Plan  · Continue sinemet   · PT/OT evaluation          Other schizophrenia ([facility] [address] )  Assessment & Plan  · Continue with home regimen             VTE Prophylaxis:  Heparin    Patient Centered Rounds: I have performed bedside rounds with nursing staff today  Discussions with Specialists or Other Care Team Provider: GI, surgery   Education and Discussions with Family / Patient: patient and POA via phone     Current Length of Stay: 4 day(s)    Current Patient Status: Inpatient   Certification Statement: The patient will continue to require additional inpatient hospital stay due to ileus    Discharge Plan: hopeful discharge back to personal care home Monday     Code Status: Level 1 - Full Code    Subjective:   Feeling okay  Denies any n/v/abdominal pain  Patient had one loose stool today per staff       Objective:     Vitals:   Temp (24hrs), Av 6 °F (35 9 °C), Min:95 7 °F (35 4 °C), Max:98 6 °F (37 °C)    Temp:  [95 7 °F (35 4 °C)-98 6 °F (37 °C)] 97 °F (36 1 °C)  HR:  [55-72] 64  Resp:  [16-20] 18  BP: (104-174)/(58-98) 174/98  SpO2:  [94 %-100 %] 97 %  Body mass index is 32 91 kg/m²  Input and Output Summary (last 24 hours): Intake/Output Summary (Last 24 hours) at 2/5/2022 1119  Last data filed at 2/4/2022 2330  Gross per 24 hour   Intake --   Output 1200 ml   Net -1200 ml       Physical Exam:   Physical Exam  Vitals and nursing note reviewed  Constitutional:       General: She is not in acute distress  Appearance: Normal appearance  HENT:      Head: Normocephalic and atraumatic  Right Ear: External ear normal       Left Ear: External ear normal       Nose: Nose normal  No rhinorrhea  Mouth/Throat:      Mouth: Mucous membranes are moist       Pharynx: Oropharynx is clear  Eyes:      General:         Right eye: No discharge  Left eye: No discharge  Pupils: Pupils are equal, round, and reactive to light  Cardiovascular:      Rate and Rhythm: Normal rate and regular rhythm  Pulses: Normal pulses  Heart sounds: Normal heart sounds  No murmur heard  Pulmonary:      Effort: Pulmonary effort is normal  No respiratory distress  Breath sounds: Normal breath sounds  Abdominal:      General: Bowel sounds are normal  There is distension (much improved since admission, soft )  Palpations: Abdomen is soft  There is no mass  Tenderness: There is no abdominal tenderness  Musculoskeletal:         General: No swelling or tenderness  Normal range of motion  Cervical back: Normal range of motion and neck supple  No muscular tenderness  Skin:     General: Skin is warm and dry  Capillary Refill: Capillary refill takes less than 2 seconds  Coloration: Skin is pale  Findings: No erythema or rash  Neurological:      General: No focal deficit present  Mental Status: She is alert and oriented to person, place, and time  Mental status is at baseline  Psychiatric:         Mood and Affect: Mood normal          Behavior: Behavior normal          Thought Content: Thought content normal          Additional Data:     Labs:    Results from last 7 days   Lab Units 02/05/22  0446 02/04/22  1104 02/03/22  0426   WBC Thousand/uL 8 87   < > 13 80*   HEMOGLOBIN g/dL 11 7   < > 12 5   HEMATOCRIT % 35 2   < > 39 5   PLATELETS Thousands/uL 226   < > 278   NEUTROS PCT %  --   --  83*   LYMPHS PCT %  --   --  10*   MONOS PCT %  --   --  6   EOS PCT %  --   --  0    < > = values in this interval not displayed  Results from last 7 days   Lab Units 02/05/22  0446 02/04/22  1104 02/03/22  0426   SODIUM mmol/L 132*   < > 131*   POTASSIUM mmol/L 4 7   < > 4 9   CHLORIDE mmol/L 97   < > 97   CO2 mmol/L 30   < > 26   BUN mg/dL 14   < > 26*   CREATININE mg/dL 0 60   < > 0 64   CALCIUM mg/dL 8 8   < > 9 1   ALK PHOS U/L  --   --  70   ALT U/L  --   --  <3*   AST U/L  --   --  14    < > = values in this interval not displayed  Results from last 7 days   Lab Units 01/31/22  1451   INR  0 99     Results from last 7 days   Lab Units 02/04/22 2028 02/04/22  1539 02/04/22  1126 02/04/22  0638 02/04/22  0026 02/03/22 2022 02/03/22  1532 02/03/22  1101 02/03/22  0632 02/02/22  1937 02/02/22  1501 02/02/22  1210   POC GLUCOSE mg/dl 175* 143* 116 172* 165* 131 149* 171* 185* 192* 91 104           * I Have Reviewed All Lab Data Listed Above  * Additional Pertinent Lab Tests Reviewed:  [address] Admission  Reviewed    Imaging:  Imaging Reports Reviewed Today Include: n/a     Recent Cultures (last 7 days):           Last 24 Hours Medication List:   Current Facility-Administered Medications   Medication Dose Route Frequency Provider Last Rate    acetaminophen  650 mg Oral Q6H PRN ARTHUR Mcgrath      amLODIPine  10 mg Oral Daily ARTHUR Mcgrath      atorvastatin  40 mg Oral Daily ARTHUR Mcgrath      carbidopa-levodopa  1 tablet Oral BID Mark Benson Koki Fillers, CRNP      divalproex sodium  500 mg Oral HS ARTHUR Daall      divalproex sodium  250 mg Oral Daily ARTHUR Dalal      gabapentin  100 mg Oral TID ARTHUR Dalal      heparin (porcine)  5,000 Units Subcutaneous [facility] ARTHUR Dalal      levothyroxine  100 mcg Oral Early Morning ARTHUR Dalal      meclizine  12 5 mg Oral Daily ARTHUR Dalal      OLANZapine  7 5 mg Oral HS ARTHUR Dalal      ondansetron  4 mg Intravenous Once PRN Manuel Pearl MD      polyethylene glycol  17 g Oral BID ARTHUR Dalal      [START ON 2/6/2022] predniSONE  40 mg Oral Daily ARTHUR Dalal      Followed by   Authur Sever ON 2/7/2022] predniSONE  20 mg Oral Daily ARTHUR Dalal      Followed by   Authur Sever ON 2/8/2022] predniSONE  10 mg Oral Daily ARTHUR Dalal      propranolol  10 mg Oral BID ARTHUR Dalal      zolpidem  5 mg Oral HS ARTHUR Dalal          Today, Patient Was Seen By: ARTHUR Dalal    ** Please Note: Dictation voice to text software may have been used in the creation of this document   **

## 2022-02-05 NOTE — PLAN OF CARE
Problem: MOBILITY - ADULT  Goal: Maintain or return to baseline ADL function  Description: INTERVENTIONS:  -  Assess patient's ability to carry out ADLs; assess patient's baseline for ADL function and identify physical deficits which impact ability to perform ADLs (bathing, care of mouth/teeth, toileting, grooming, dressing, etc )  - Assess/evaluate cause of self-care deficits   - Assess range of motion  - Assess patient's mobility; develop plan if impaired  - Assess patient's need for assistive devices and provide as appropriate  - Encourage maximum independence but intervene and supervise when necessary  - Involve family in performance of ADLs  - Assess for home care needs following discharge   - Consider OT consult to assist with ADL evaluation and planning for discharge  - Provide patient education as appropriate  Outcome: Progressing  Goal: Maintains/Returns to pre admission functional level  Description: INTERVENTIONS:  - Perform BMAT or MOVE assessment daily    - Set and communicate daily mobility goal to care team and patient/family/caregiver     - Collaborate with rehabilitation services on mobility goals if consulted  - Stand patient 3 times a day  - Ambulate patient 3 times a day  - Out of bed to chair 3 times a day   - Out of bed for toileting  - Record patient progress and toleration of activity level   Outcome: Progressing     Problem: Prexisting or High Potential for Compromised Skin Integrity  Goal: Skin integrity is maintained or improved  Description: INTERVENTIONS:  - Identify patients at risk for skin breakdown  - Assess and monitor skin integrity  - Assess and monitor nutrition and hydration status  - Monitor labs   - Assess for incontinence   - Turn and reposition patient  - Assist with mobility/ambulation  - Relieve pressure over bony prominences  - Avoid friction and shearing  - Provide appropriate hygiene as needed including keeping skin clean and dry  - Evaluate need for skin moisturizer/barrier cream  - Collaborate with interdisciplinary team   - Patient/family teaching  - Consider wound care consult   Outcome: Progressing     Problem: PAIN - ADULT  Goal: Verbalizes/displays adequate comfort level or baseline comfort level  Description: Interventions:  - Encourage patient to monitor pain and request assistance  - Assess pain using appropriate pain scale  - Administer analgesics based on type and severity of pain and evaluate response  - Implement non-pharmacological measures as appropriate and evaluate response  - Consider cultural and social influences on pain and pain management  - Notify physician/advanced practitioner if interventions unsuccessful or patient reports new pain  Outcome: Progressing     Problem: INFECTION - ADULT  Goal: Absence or prevention of progression during hospitalization  Description: INTERVENTIONS:  - Assess and monitor for signs and symptoms of infection  - Monitor lab/diagnostic results  - Monitor all insertion sites, i e  indwelling lines, tubes, and drains  - Monitor endotracheal if appropriate and nasal secretions for changes in amount and color  - [place] appropriate cooling/warming therapies per order  - Administer medications as ordered  - Instruct and encourage patient and family to use good hand hygiene technique  - Identify and instruct in appropriate isolation precautions for identified infection/condition  Outcome: Progressing  Goal: Absence of fever/infection during neutropenic period  Description: INTERVENTIONS:  - Monitor WBC    Outcome: Progressing     Problem: SAFETY ADULT  Goal: Patient will remain free of falls  Description: INTERVENTIONS:  - Educate patient/family on patient safety including physical limitations  - Instruct patient to call for assistance with activity   - Consult OT/PT to assist with strengthening/mobility   - Keep Call bell within reach  - Keep bed low and locked with side rails adjusted as appropriate  - Keep care items and personal belongings within reach  - Initiate and maintain comfort rounds  - Make Fall Risk Sign visible to staff  - Apply yellow socks and bracelet for high fall risk patients  - Consider moving patient to room near nurses station  Outcome: Progressing     Problem: DISCHARGE PLANNING  Goal: Discharge to home or other facility with appropriate resources  Description: INTERVENTIONS:  - Identify barriers to discharge w/patient and caregiver  - Arrange for needed discharge resources and transportation as appropriate  - Identify discharge learning needs (meds, wound care, etc )  - Arrange for interpretive services to assist at discharge as needed  - Refer to Case Management Department for coordinating discharge planning if the patient needs post-hospital services based on physician/advanced practitioner order or complex needs related to functional status, cognitive ability, or social support system  Outcome: Progressing     Problem: Knowledge Deficit  Goal: Patient/family/caregiver demonstrates understanding of disease process, treatment plan, medications, and discharge instructions  Description: Complete learning assessment and assess knowledge base    Interventions:  - Provide teaching at level of understanding  - Provide teaching via preferred learning methods  Outcome: Progressing     Problem: Potential for Falls  Goal: Patient will remain free of falls  Description: INTERVENTIONS:  - Educate patient/family on patient safety including physical limitations  - Instruct patient to call for assistance with activity   - Consult OT/PT to assist with strengthening/mobility   - Keep Call bell within reach  - Keep bed low and locked with side rails adjusted as appropriate  - Keep care items and personal belongings within reach  - Initiate and maintain comfort rounds  - Make Fall Risk Sign visible to staff  - Apply yellow socks and bracelet for high fall risk patients  - Consider moving patient to room near nurses station  Outcome: Progressing     Problem: Nutrition/Hydration-ADULT  Goal: Nutrient/Hydration intake appropriate for improving, restoring or maintaining nutritional needs  Description: Monitor and assess patient's nutrition/hydration status for malnutrition  Collaborate with interdisciplinary team and initiate plan and interventions as ordered  Monitor patient's weight and dietary intake as ordered or per policy  Utilize nutrition screening tool and intervene as necessary  Determine patient's food preferences and provide high-protein, high-caloric foods as appropriate       INTERVENTIONS:  - Monitor oral intake, urinary output, labs, and treatment plans  - Assess nutrition and hydration status and recommend course of action  - Evaluate amount of meals eaten  - Assist patient with eating if necessary   - Allow adequate time for meals  - Recommend/ encourage appropriate diets, oral nutritional supplements, and vitamin/mineral supplements  - Order, calculate, and assess calorie counts as needed  - Recommend, monitor, and adjust tube feedings and TPN/PPN based on assessed needs  - Assess need for intravenous fluids  - Provide specific nutrition/hydration education as appropriate  - Include patient/family/caregiver in decisions related to nutrition  Outcome: Progressing

## 2022-02-05 NOTE — ASSESSMENT & PLAN NOTE
· As evident on CT a/p with severe distension of the colon with gas and stool with a rub transition at the proximal sigmoid  Suspected this could be related to a colonic ileus  Distal esophagus is distended with material    · Gastroenterology input appreciated-  · Underwent EGD and flexible sigmoidoscopy 2/1- unable to visualize due to poor bowel prep  Area of ulceration noted  Biopsy pending  · 2/4 underwent repeat flexible sigmoidoscopy- no mass or obvious stricture  · GI recommend to continue with MiraLax BID  Do not hold even with loose stool as there appears to be angulation and lumen narrowing in sigmoid colon  · Surgery input appreciated   · As the patient does not any peritoneal signs, recommend to continue with MiraLax  Monitor closely as she is at risk for stercoral perforation which will require urgent laparotomy with resection/diversion of sigmoid colon  Patient is clinically improving  No further surgical intervention indicated at this time  May follow up with surgery as needed outpatient       · Continue with serial abdominal exam  · Supportive care

## 2022-02-05 NOTE — ASSESSMENT & PLAN NOTE
78 yo F with possible LBO on admission CT  Tolerating diet, no pain, having BMs  Abdomen with no tenderness, fullness in RUQ  T 97, HR 64, RR 18, /98, SpO2 97%  WBC 8 87, Na 132    Assessment:  Transient colonic ileus, no signs of obstruction per GI work-up    Plan:  Stable surgically  Bowel regimen per GI recs  Can follow-up with Surgery on as needed basis  Surgery will sign off at this time

## 2022-02-05 NOTE — PROGRESS NOTES
[address]  Progress Note Zaina Adams 1945, 77 y o  female MRN: [id number]  Unit/Bed#: -01 Encounter: [id number]  Primary Care Provider: No primary care provider on file  Date and time admitted to hospital: 1/31/2022  1:56 PM    * [facility])  Assessment & Plan  78 yo F with possible LBO on admission CT  Tolerating diet, no pain, having BMs  Abdomen with no tenderness, fullness in RUQ  T 97, HR 64, RR 18, /98, SpO2 97%  WBC 8 87, Na 132    Assessment:  Transient colonic ileus, no signs of obstruction per GI work-up    Plan:  Stable surgically  Bowel regimen per GI recs  Can follow-up with Surgery on as needed basis  Surgery will sign off at this time  Subjective/Objective   Chief Complaint: having BM    Subjective: reports multiple BMs and stool incontinence, no abdominal pain, ate entire breakfast, no N/V    Objective: no overnight events per patient    Blood pressure (!) 174/98, pulse 64, temperature (!) 97 °F (36 1 °C), resp  rate 18, height 5' 6" (1 676 m), weight 92 5 kg (203 lb 14 8 oz), SpO2 97 %  ,Body mass index is 32 91 kg/m²  Intake/Output Summary (Last 24 hours) at 2/5/2022 0958  Last data filed at 2/4/2022 2330  Gross per 24 hour   Intake --   Output 1200 ml   Net -1200 ml       Invasive Devices  Report    Peripheral Intravenous Line            Peripheral IV 02/03/22 Dorsal (posterior); Right Wrist 2 days          Drain            External Urinary Catheter 4 days                Physical Exam  Vitals and nursing note reviewed  Constitutional:       General: She is not in acute distress  Appearance: She is well-developed  She is obese  She is not diaphoretic  HENT:      Head: Normocephalic and atraumatic  Eyes:      Conjunctiva/sclera: Conjunctivae normal       Pupils: Pupils are equal, round, and reactive to light  Pulmonary:      Effort: No respiratory distress  Abdominal:      Comments: Obese abdomen   Overall soft, but firm in RUQ  No tenderness  Normal bowel sounds  Musculoskeletal:         General: Normal range of motion  Cervical back: Normal range of motion  Skin:     General: Skin is warm and dry  Capillary Refill: Capillary refill takes less than 2 seconds  Neurological:      Mental Status: She is alert and oriented to person, place, and time  Psychiatric:         Behavior: Behavior normal            Lab, Imaging and other studies:  I have personally reviewed pertinent lab results    , CBC:   Lab Results   Component Value Date    WBC 8 87 02/05/2022    HGB 11 7 02/05/2022    HCT 35 2 02/05/2022    MCV 85 02/05/2022     02/05/2022    MCH 28 4 02/05/2022    MCHC 33 2 02/05/2022    RDW 15 0 02/05/2022    MPV 9 0 02/05/2022   , CMP:   Lab Results   Component Value Date    SODIUM 132 (L) 02/05/2022    K 4 7 02/05/2022    CL 97 02/05/2022    CO2 30 02/05/2022    BUN 14 02/05/2022    CREATININE 0 60 02/05/2022    CALCIUM 8 8 02/05/2022    EGFR 89 02/05/2022     VTE Pharmacologic Prophylaxis: Reason for no pharmacologic prophylaxis per primary  VTE Mechanical Prophylaxis: sequential compression device

## 2022-02-05 NOTE — ASSESSMENT & PLAN NOTE
· Initially did not require any oxygen on admission; but became hypoxia and now on 2L NC - titrate off O2 to keep SpO2 greater than 90%  · Admitted under mild pathway   · Due to her comorbidity, started on remdesivir completed course on 2/4/22  · Added dexamethasone 6 mg daily on 2/2/22- day #4/10; patient does not require any O2 further will taper with PO prednisone   · D-dimers- 1 2- continue on heparin 5000 units t i d   · Will hold off on initiating baricitinib   · CT abdomen and pelvis- without evidence of pneumonia  · Trend inflammatory markers   · Incentive spirometry, prone/side-lying position, cough and deep breathing

## 2022-02-06 PROBLEM — E16.2 HYPOGLYCEMIA: Status: RESOLVED | Noted: 2022-02-02 | Resolved: 2022-02-06

## 2022-02-06 LAB
ANION GAP SERPL CALCULATED.3IONS-SCNC: 5 MMOL/L (ref 4–13)
BUN SERPL-MCNC: 18 MG/DL (ref 5–25)
CALCIUM SERPL-MCNC: 8.7 MG/DL (ref 8.4–10.2)
CHLORIDE SERPL-SCNC: 94 MMOL/L (ref 96–108)
CO2 SERPL-SCNC: 33 MMOL/L (ref 21–32)
CREAT SERPL-MCNC: 0.72 MG/DL (ref 0.6–1.3)
ERYTHROCYTE [DISTWIDTH] IN BLOOD BY AUTOMATED COUNT: 15.3 % (ref 11.6–15.1)
GFR SERPL CREATININE-BSD FRML MDRD: 82 ML/MIN/1.73SQ M
GLUCOSE SERPL-MCNC: 101 MG/DL (ref 65–140)
GLUCOSE SERPL-MCNC: 147 MG/DL (ref 65–140)
GLUCOSE SERPL-MCNC: 186 MG/DL (ref 65–140)
GLUCOSE SERPL-MCNC: 74 MG/DL (ref 65–140)
GLUCOSE SERPL-MCNC: 78 MG/DL (ref 65–140)
HCT VFR BLD AUTO: 37.7 % (ref 34.8–46.1)
HGB BLD-MCNC: 12.5 G/DL (ref 11.5–15.4)
MCH RBC QN AUTO: 28.5 PG (ref 26.8–34.3)
MCHC RBC AUTO-ENTMCNC: 33.2 G/DL (ref 31.4–37.4)
MCV RBC AUTO: 86 FL (ref 82–98)
PLATELET # BLD AUTO: 275 THOUSANDS/UL (ref 149–390)
PMV BLD AUTO: 9.1 FL (ref 8.9–12.7)
POTASSIUM SERPL-SCNC: 4.7 MMOL/L (ref 3.5–5.3)
RBC # BLD AUTO: 4.38 MILLION/UL (ref 3.81–5.12)
SODIUM SERPL-SCNC: 132 MMOL/L (ref 135–147)
WBC # BLD AUTO: 14.05 THOUSAND/UL (ref 4.31–10.16)

## 2022-02-06 PROCEDURE — 99232 SBSQ HOSP IP/OBS MODERATE 35: CPT | Performed by: NURSE PRACTITIONER

## 2022-02-06 PROCEDURE — 99231 SBSQ HOSP IP/OBS SF/LOW 25: CPT | Performed by: INTERNAL MEDICINE

## 2022-02-06 PROCEDURE — 85027 COMPLETE CBC AUTOMATED: CPT | Performed by: NURSE PRACTITIONER

## 2022-02-06 PROCEDURE — 80048 BASIC METABOLIC PNL TOTAL CA: CPT | Performed by: NURSE PRACTITIONER

## 2022-02-06 PROCEDURE — 82948 REAGENT STRIP/BLOOD GLUCOSE: CPT

## 2022-02-06 RX ADMIN — MECLIZINE 12.5 MG: 12.5 TABLET ORAL at 08:01

## 2022-02-06 RX ADMIN — OLANZAPINE 7.5 MG: 5 TABLET, FILM COATED ORAL at 21:58

## 2022-02-06 RX ADMIN — HEPARIN SODIUM 5000 UNITS: 5000 INJECTION INTRAVENOUS; SUBCUTANEOUS at 06:47

## 2022-02-06 RX ADMIN — POLYETHYLENE GLYCOL 3350 17 G: 17 POWDER, FOR SOLUTION ORAL at 21:58

## 2022-02-06 RX ADMIN — CARBIDOPA AND LEVODOPA 1 TABLET: 25; 100 TABLET ORAL at 21:58

## 2022-02-06 RX ADMIN — GABAPENTIN 100 MG: 100 CAPSULE ORAL at 16:51

## 2022-02-06 RX ADMIN — PROPRANOLOL HYDROCHLORIDE 10 MG: 10 TABLET ORAL at 16:52

## 2022-02-06 RX ADMIN — PROPRANOLOL HYDROCHLORIDE 10 MG: 10 TABLET ORAL at 08:01

## 2022-02-06 RX ADMIN — HEPARIN SODIUM 5000 UNITS: 5000 INJECTION INTRAVENOUS; SUBCUTANEOUS at 13:29

## 2022-02-06 RX ADMIN — HEPARIN SODIUM 5000 UNITS: 5000 INJECTION INTRAVENOUS; SUBCUTANEOUS at 21:58

## 2022-02-06 RX ADMIN — DIVALPROEX SODIUM 500 MG: 500 TABLET, EXTENDED RELEASE ORAL at 21:58

## 2022-02-06 RX ADMIN — ATORVASTATIN CALCIUM 40 MG: 40 TABLET, FILM COATED ORAL at 08:02

## 2022-02-06 RX ADMIN — AMLODIPINE BESYLATE 10 MG: 10 TABLET ORAL at 08:01

## 2022-02-06 RX ADMIN — POLYETHYLENE GLYCOL 3350 17 G: 17 POWDER, FOR SOLUTION ORAL at 08:02

## 2022-02-06 RX ADMIN — DIVALPROEX SODIUM 250 MG: 250 TABLET, DELAYED RELEASE ORAL at 08:01

## 2022-02-06 RX ADMIN — GABAPENTIN 100 MG: 100 CAPSULE ORAL at 08:01

## 2022-02-06 RX ADMIN — PREDNISONE 40 MG: 20 TABLET ORAL at 08:01

## 2022-02-06 RX ADMIN — LEVOTHYROXINE SODIUM 100 MCG: 100 TABLET ORAL at 06:47

## 2022-02-06 RX ADMIN — GABAPENTIN 100 MG: 100 CAPSULE ORAL at 21:59

## 2022-02-06 RX ADMIN — CARBIDOPA AND LEVODOPA 1 TABLET: 25; 100 TABLET ORAL at 08:02

## 2022-02-06 RX ADMIN — ZOLPIDEM TARTRATE 5 MG: 5 TABLET ORAL at 21:59

## 2022-02-06 NOTE — PROGRESS NOTES
Progress Note- Reynaldo Scott 77 y o  female MRN: [id number]    Unit/Bed#: -01 Encounter: [id number]      Assessment and Plan:    78-year-old female with hypothyroidism, Parkinson disease, schizophrenia presenting 1/30 due to generalized weakness and 1 episode of vomiting  CT abdomen pelvis demonstrates severe distention of the colon with abrupt transition the proximal sigmoid  Colonoscopy on 02/01 limited due to solid stool despite tap water enemas, but noted area of ulceration and erythematous mucosa  Biopsies taken of abnormal mucosa  Repeat flex sig using EGD scope advanced to 45 cm with significant angulation and luminal narrowing but no discrete mass or focal stricture identified      Unclear etiology for significant luminal narrowing  At this time would continue recommendation of aggressive bowel regimen and she should remain on MiraLax b i d  with goal of 1 soft bowel movement per day  She should be counseled that stool should be soft infrequent to avoid obstruction  Would provide instructions to group home the patient should be having a bowel movement at least every other day  She is planned for discharge tomorrow  GI will sign off, please call with questions    ______________________________________________________________________    Subjective:     No complaints today  She had a bowel movement small in size yesterday evening  She is tolerating p o  without difficulty      Medication Administration - last 24 hours from 02/05/2022 1128 to 02/06/2022 1128       Date/Time Order Dose Route Action Action by     02/06/2022 0801 amLODIPine (NORVASC) tablet 10 mg 10 mg Oral Given Israel Gutierrez RN     02/06/2022 0802 atorvastatin (LIPITOR) tablet 40 mg 40 mg Oral Given Israel Gutierrez RN     02/06/2022 0802 carbidopa-levodopa (SINEMET)  mg per tablet 1 tablet 1 tablet Oral Given Israel Gutierrez RN     02/05/2022 2306 carbidopa-levodopa (SINEMET)  mg per tablet 1 tablet 1 tablet Oral Given Riddhi Walton RN     02/05/2022 2255 divalproex sodium (DEPAKOTE ER) 24 hr tablet 500 mg 500 mg Oral Given Riddhi Walton RN     02/06/2022 0801 divalproex sodium (DEPAKOTE) EC tablet 250 mg 250 mg Oral Given Israel Gutierrez RN     02/06/2022 0801 gabapentin (NEURONTIN) capsule 100 mg 100 mg Oral Given Israel Gutierrez RN     02/05/2022 2306 gabapentin (NEURONTIN) capsule 100 mg 100 mg Oral Given Riddhi Walton RN     02/05/2022 1818 gabapentin (NEURONTIN) capsule 100 mg 100 mg Oral Given Israel Gutierrez RN     02/06/2022 9501 levothyroxine tablet 100 mcg 100 mcg Oral Given Riddhi Walton RN     02/06/2022 0801 meclizine (ANTIVERT) tablet 12 5 mg 12 5 mg Oral Given Irsael Gutierrez RN     02/05/2022 2255 OLANZapine (ZyPREXA) tablet 7 5 mg 7 5 mg Oral Given Riddhi Walton RN     02/06/2022 0801 propranolol (INDERAL) tablet 10 mg 10 mg Oral Given Israel Gutierrez RN     02/05/2022 1817 propranolol (INDERAL) tablet 10 mg 10 mg Oral Given Israel Gutierrez RN     02/05/2022 2255 zolpidem (AMBIEN) tablet 5 mg 5 mg Oral Given Riddhi Walton RN     02/06/2022 1035 heparin (porcine) subcutaneous injection 5,000 Units 5,000 Units Subcutaneous Given Riddhi Walton RN     02/05/2022 2255 heparin (porcine) subcutaneous injection 5,000 Units 5,000 Units Subcutaneous Given Riddhi Walton RN     02/05/2022 1817 heparin (porcine) subcutaneous injection 5,000 Units 5,000 Units Subcutaneous Given Israel Gutierrez RN     02/06/2022 0802 polyethylene glycol (MIRALAX) packet 17 g 17 g Oral Given Israel Gutierrez RN     02/05/2022 2306 polyethylene glycol (MIRALAX) packet 17 g 17 g Oral Given Riddhi Walton RN     02/06/2022 0801 predniSONE tablet 40 mg 40 mg Oral Given Israel Gutierrez RN          Objective:     Vitals: Blood pressure 149/79, pulse 55, temperature (!) 96 8 °F (36 °C), resp   rate 18, height 5' 6" (1 676 m), weight 92 5 kg (203 lb 14 8 oz), SpO2 97 %  ,Body mass index is 32 91 kg/m²  Intake/Output Summary (Last 24 hours) at 2/6/2022 1128  Last data filed at 2/5/2022 2301  Gross per 24 hour   Intake --   Output 900 ml   Net -900 ml       Physical Exam:   General Appearance: Awake and alert, in no acute distress  Abdomen: Soft, non-tender, mildly distended, bowel sounds normal; no masses or no organomegaly    Invasive Devices  Report    Drain            External Urinary Catheter 5 days                Lab Results:  No results displayed because visit has over 200 results  Imaging Studies: I have personally reviewed pertinent imaging studies

## 2022-02-06 NOTE — PROGRESS NOTES
[address] 46  Progress Note Ishaan Christianson 1945, 77 y o  female MRN: [id number]  Unit/Bed#: -01 Encounter: [id number]  Primary Care Provider: No primary care provider on file  Date and time admitted to hospital: 1/31/2022  1:56 PM    * [facility])  Assessment & Plan  · As evident on CT a/p with severe distension of the colon with gas and stool with a rub transition at the proximal sigmoid  Suspected this could be related to a colonic ileus  Distal esophagus is distended with material    · Gastroenterology input appreciated-  · Underwent EGD and flexible sigmoidoscopy 2/1- unable to visualize due to poor bowel prep  Area of ulceration noted  Biopsy pending  · 2/4 underwent repeat flexible sigmoidoscopy- no mass or obvious stricture  · GI recommend to continue with MiraLax BID  Do not hold even with loose stool as there appears to be angulation and lumen narrowing in sigmoid colon  · Surgery input appreciated   · As the patient does not any peritoneal signs, recommend to continue with MiraLax  Monitor closely as she is at risk for stercoral perforation which will require urgent laparotomy with resection/diversion of sigmoid colon  Patient is clinically improving  No further surgical intervention indicated at this time  May follow up with surgery as needed outpatient       · Continue with serial abdominal exam   · Supportive care    COVID-19  Assessment & Plan  · Initially did not require any oxygen on admission; but became hypoxia and now on 2L NC - titrate off O2 to keep SpO2 greater than 90%  · Admitted under mild pathway   · Due to her comorbidity, started on remdesivir completed course on 2/4/22  · Added dexamethasone 6 mg daily on 2/2/22- day #4/10; patient does not require any O2 further and started on PO prednisone   · D-dimers- 1 2- continue on heparin 5000 units t i d   · Will hold off on initiating baricitinib   · CT abdomen and pelvis- without evidence of pneumonia  · Trend inflammatory markers   · Incentive spirometry, prone/side-lying position, cough and deep breathing    · Leukocytosis likely related to steroid induced; afebrile     Suspected UTI  Assessment & Plan  · With increasing urinary frequency  · The patient received 3 days of ceftriaxone  · This is ruled out         Seizure disorder (HCC)  Assessment & Plan  · Valproic acid level-60  · Continue with home regimen          Hyponatremia  Assessment & Plan  · Likely due to hypovolemia hyponatremia with recent vomiting  · Received IVF  · Monitor Na level    · Placed on fluid restriction     Acquired hypothyroidism  Assessment & Plan  · TSH - 5 136  · Continue with levothyroxine         Primary hypertension  Assessment & Plan  · Monitor BP closely     · Continue with lisinopril, propranolol and amlodipine       Parkinson's disease (HCC)  Assessment & Plan  · Continue sinemet   · PT/OT evaluation           Other schizophrenia ([facility] [address] )  Assessment & Plan  · Continue with home regimen          Hypoglycemia-resolved as of 2022  Assessment & Plan  · Patient was NPO   · Resolved   · Hypoglycemia protocol  · 2/3 The patient received half an amp D50 for blood glucose of 48           VTE Prophylaxis:  Heparin    Patient Centered Rounds: I have performed bedside rounds with nursing staff today  Discussions with Specialists or Other Care Team Provider:  GI  Education and Discussions with Family / Patient: patient and Jose Elias KRISHNAN     Current Length of Stay: 5 day(s)    Current Patient Status: Inpatient   Certification Statement: The patient will continue to require additional inpatient hospital stay due to ileus     Discharge Plan: pending hospital course  Plan to return to personal care home tomorrow  Code Status: Level 1 - Full Code    Subjective:   Feeling okay  No abdominal pain       Objective:     Vitals:   Temp (24hrs), Av 3 °F (35 7 °C), Min:95 8 °F (35 4 °C), Max:96 8 °F (36 °C)    Temp:  [95 8 °F (35 4 °C)-96 8 °F (36 °C)] 96 8 °F (36 °C)  HR:  [55-71] 55  BP: (126-149)/(74-80) 149/79  SpO2:  [94 %-97 %] 97 %  Body mass index is 32 91 kg/m²  Input and Output Summary (last 24 hours): Intake/Output Summary (Last 24 hours) at 2/6/2022 1546  Last data filed at 2/5/2022 2301  Gross per 24 hour   Intake --   Output 900 ml   Net -900 ml       Physical Exam:   Physical Exam    Additional Data:     Labs:    Results from last 7 days   Lab Units 02/06/22  0650 02/04/22  1104 02/03/22  0426   WBC Thousand/uL 14 05*   < > 13 80*   HEMOGLOBIN g/dL 12 5   < > 12 5   HEMATOCRIT % 37 7   < > 39 5   PLATELETS Thousands/uL 275   < > 278   NEUTROS PCT %  --   --  83*   LYMPHS PCT %  --   --  10*   MONOS PCT %  --   --  6   EOS PCT %  --   --  0    < > = values in this interval not displayed  Results from last 7 days   Lab Units 02/06/22  0650 02/04/22  1104 02/03/22  0426   SODIUM mmol/L 132*   < > 131*   POTASSIUM mmol/L 4 7   < > 4 9   CHLORIDE mmol/L 94*   < > 97   CO2 mmol/L 33*   < > 26   BUN mg/dL 18   < > 26*   CREATININE mg/dL 0 72   < > 0 64   CALCIUM mg/dL 8 7   < > 9 1   ALK PHOS U/L  --   --  70   ALT U/L  --   --  <3*   AST U/L  --   --  14    < > = values in this interval not displayed  Results from last 7 days   Lab Units 01/31/22  1451   INR  0 99     Results from last 7 days   Lab Units 02/06/22  1103 02/06/22  0646 02/05/22  2259 02/05/22  1629 02/04/22 2028 02/04/22  1539 02/04/22  1126 02/04/22  0276 02/04/22  0026 02/03/22 2022 02/03/22  1532 02/03/22  1101   POC GLUCOSE mg/dl 101 78 112 139 175* 143* 116 172* 165* 131 149* 171*           * I Have Reviewed All Lab Data Listed Above  * Additional Pertinent Lab Tests Reviewed:  [facility] Admission  Reviewed    Imaging:  Imaging Reports Reviewed Today Include: n/a     Recent Cultures (last 7 days):           Last 24 Hours Medication List:   Current Facility-Administered Medications   Medication Dose Route Frequency Provider Last Rate    acetaminophen  650 mg Oral Q6H PRN Yamileth Vang, CRNP      amLODIPine  10 mg Oral Daily Yamileth Vang, CRNP      atorvastatin  40 mg Oral Daily Yamileth Vang, CRNP      carbidopa-levodopa  1 tablet Oral BID Yamileth Vang, CRNP      divalproex sodium  500 mg Oral HS Yamileth Vang, CRNP      divalproex sodium  250 mg Oral Daily Yamileth Vang, CRNP      gabapentin  100 mg Oral TID Yamileth Vang, CRNP      heparin (porcine)  5,000 Units Subcutaneous [facility] Yamileth Vang, CRNP      levothyroxine  100 mcg Oral Early Morning Yamileth Vang, CRNP      meclizine  12 5 mg Oral Daily Yamileth Vang, CRNP      OLANZapine  7 5 mg Oral HS Yamileth Vang, CRNP      polyethylene glycol  17 g Oral BID Yamileth Vang, CRNP      [START ON 2/7/2022] predniSONE  20 mg Oral Daily Yamileth Vang, CRNP      Followed by   Cecelia Reese ON 2/8/2022] predniSONE  10 mg Oral Daily Yamileth Vang, CRNP      propranolol  10 mg Oral BID Yamileth Vang, CRNP      zolpidem  5 mg Oral HS Yamileth Vang, CRNP          Today, Patient Was Seen By: ARTHUR Be    ** Please Note: Dictation voice to text software may have been used in the creation of this document   **

## 2022-02-06 NOTE — ASSESSMENT & PLAN NOTE
· Likely due to hypovolemia hyponatremia with recent vomiting  · Received IVF  · Monitor Na level    · Placed on fluid restriction

## 2022-02-06 NOTE — ASSESSMENT & PLAN NOTE
· Patient was NPO   · Resolved   · Hypoglycemia protocol  · 2/3 The patient received half an amp D50 for blood glucose of 48

## 2022-02-06 NOTE — PLAN OF CARE
Problem: Prexisting or High Potential for Compromised Skin Integrity  Goal: Skin integrity is maintained or improved  Description: INTERVENTIONS:  - Identify patients at risk for skin breakdown  - Assess and monitor skin integrity  - Assess and monitor nutrition and hydration status  - Monitor labs   - Assess for incontinence   - Turn and reposition patient  - Assist with mobility/ambulation  - Relieve pressure over bony prominences  - Avoid friction and shearing  - Provide appropriate hygiene as needed including keeping skin clean and dry  - Evaluate need for skin moisturizer/barrier cream  - Collaborate with interdisciplinary team   - Patient/family teaching  - Consider wound care consult   Outcome: Progressing     Problem: DISCHARGE PLANNING  Goal: Discharge to home or other facility with appropriate resources  Description: INTERVENTIONS:  - Identify barriers to discharge w/patient and caregiver  - Arrange for needed discharge resources and transportation as appropriate  - Identify discharge learning needs (meds, wound care, etc )  - Arrange for interpretive services to assist at discharge as needed  - Refer to Case Management Department for coordinating discharge planning if the patient needs post-hospital services based on physician/advanced practitioner order or complex needs related to functional status, cognitive ability, or social support system  Outcome: Progressing     Problem: Knowledge Deficit  Goal: Patient/family/caregiver demonstrates understanding of disease process, treatment plan, medications, and discharge instructions  Description: Complete learning assessment and assess knowledge base    Interventions:  - Provide teaching at level of understanding  - Provide teaching via preferred learning methods  Outcome: Progressing

## 2022-02-06 NOTE — ASSESSMENT & PLAN NOTE
· Initially did not require any oxygen on admission; but became hypoxia and now on 2L NC - titrate off O2 to keep SpO2 greater than 90%  · Admitted under mild pathway   · Due to her comorbidity, started on remdesivir completed course on 2/4/22  · Added dexamethasone 6 mg daily on 2/2/22- day #4/10; patient does not require any O2 further and started on PO prednisone   · D-dimers- 1 2- continue on heparin 5000 units t i d   · Will hold off on initiating baricitinib   · CT abdomen and pelvis- without evidence of pneumonia  · Trend inflammatory markers   · Incentive spirometry, prone/side-lying position, cough and deep breathing    · Leukocytosis likely related to steroid induced; afebrile

## 2022-02-06 NOTE — DISCHARGE INSTR - AVS FIRST PAGE
Please continue with MiraLax twice daily for now  Knee decreased to once daily the patient has multiple episodes of diarrhea in 1 day  We would like patient to have at least 1 bowel movement every day or every other day to avoid any constipation in the future

## 2022-02-07 LAB
ANION GAP SERPL CALCULATED.3IONS-SCNC: 6 MMOL/L (ref 4–13)
BUN SERPL-MCNC: 22 MG/DL (ref 5–25)
CALCIUM SERPL-MCNC: 8.9 MG/DL (ref 8.4–10.2)
CHLORIDE SERPL-SCNC: 95 MMOL/L (ref 96–108)
CO2 SERPL-SCNC: 31 MMOL/L (ref 21–32)
CREAT SERPL-MCNC: 0.68 MG/DL (ref 0.6–1.3)
ERYTHROCYTE [DISTWIDTH] IN BLOOD BY AUTOMATED COUNT: 15.4 % (ref 11.6–15.1)
GFR SERPL CREATININE-BSD FRML MDRD: 85 ML/MIN/1.73SQ M
GLUCOSE SERPL-MCNC: 116 MG/DL (ref 65–140)
GLUCOSE SERPL-MCNC: 120 MG/DL (ref 65–140)
GLUCOSE SERPL-MCNC: 132 MG/DL (ref 65–140)
GLUCOSE SERPL-MCNC: 157 MG/DL (ref 65–140)
GLUCOSE SERPL-MCNC: 166 MG/DL (ref 65–140)
HCT VFR BLD AUTO: 35.3 % (ref 34.8–46.1)
HGB BLD-MCNC: 11.6 G/DL (ref 11.5–15.4)
MCH RBC QN AUTO: 28.3 PG (ref 26.8–34.3)
MCHC RBC AUTO-ENTMCNC: 32.9 G/DL (ref 31.4–37.4)
MCV RBC AUTO: 86 FL (ref 82–98)
PLATELET # BLD AUTO: 275 THOUSANDS/UL (ref 149–390)
PMV BLD AUTO: 9.3 FL (ref 8.9–12.7)
POTASSIUM SERPL-SCNC: 5.1 MMOL/L (ref 3.5–5.3)
RBC # BLD AUTO: 4.1 MILLION/UL (ref 3.81–5.12)
SODIUM SERPL-SCNC: 132 MMOL/L (ref 135–147)
WBC # BLD AUTO: 14.94 THOUSAND/UL (ref 4.31–10.16)

## 2022-02-07 PROCEDURE — 85027 COMPLETE CBC AUTOMATED: CPT | Performed by: NURSE PRACTITIONER

## 2022-02-07 PROCEDURE — 80048 BASIC METABOLIC PNL TOTAL CA: CPT | Performed by: NURSE PRACTITIONER

## 2022-02-07 PROCEDURE — 82948 REAGENT STRIP/BLOOD GLUCOSE: CPT

## 2022-02-07 PROCEDURE — 99232 SBSQ HOSP IP/OBS MODERATE 35: CPT | Performed by: PHYSICIAN ASSISTANT

## 2022-02-07 RX ADMIN — LEVOTHYROXINE SODIUM 100 MCG: 100 TABLET ORAL at 05:49

## 2022-02-07 RX ADMIN — HEPARIN SODIUM 5000 UNITS: 5000 INJECTION INTRAVENOUS; SUBCUTANEOUS at 18:08

## 2022-02-07 RX ADMIN — CARBIDOPA AND LEVODOPA 1 TABLET: 25; 100 TABLET ORAL at 09:43

## 2022-02-07 RX ADMIN — POLYETHYLENE GLYCOL 3350 17 G: 17 POWDER, FOR SOLUTION ORAL at 21:38

## 2022-02-07 RX ADMIN — GABAPENTIN 100 MG: 100 CAPSULE ORAL at 21:38

## 2022-02-07 RX ADMIN — DIVALPROEX SODIUM 250 MG: 250 TABLET, DELAYED RELEASE ORAL at 09:43

## 2022-02-07 RX ADMIN — PROPRANOLOL HYDROCHLORIDE 10 MG: 10 TABLET ORAL at 18:08

## 2022-02-07 RX ADMIN — PREDNISONE 20 MG: 20 TABLET ORAL at 09:43

## 2022-02-07 RX ADMIN — ATORVASTATIN CALCIUM 40 MG: 40 TABLET, FILM COATED ORAL at 09:42

## 2022-02-07 RX ADMIN — PROPRANOLOL HYDROCHLORIDE 10 MG: 10 TABLET ORAL at 09:42

## 2022-02-07 RX ADMIN — DIVALPROEX SODIUM 500 MG: 500 TABLET, EXTENDED RELEASE ORAL at 21:38

## 2022-02-07 RX ADMIN — POLYETHYLENE GLYCOL 3350 17 G: 17 POWDER, FOR SOLUTION ORAL at 09:43

## 2022-02-07 RX ADMIN — ZOLPIDEM TARTRATE 5 MG: 5 TABLET ORAL at 21:38

## 2022-02-07 RX ADMIN — HEPARIN SODIUM 5000 UNITS: 5000 INJECTION INTRAVENOUS; SUBCUTANEOUS at 05:51

## 2022-02-07 RX ADMIN — AMLODIPINE BESYLATE 10 MG: 10 TABLET ORAL at 09:43

## 2022-02-07 RX ADMIN — CARBIDOPA AND LEVODOPA 1 TABLET: 25; 100 TABLET ORAL at 21:38

## 2022-02-07 RX ADMIN — OLANZAPINE 7.5 MG: 5 TABLET, FILM COATED ORAL at 21:38

## 2022-02-07 RX ADMIN — GABAPENTIN 100 MG: 100 CAPSULE ORAL at 18:08

## 2022-02-07 RX ADMIN — GABAPENTIN 100 MG: 100 CAPSULE ORAL at 09:42

## 2022-02-07 RX ADMIN — MECLIZINE 12.5 MG: 12.5 TABLET ORAL at 09:42

## 2022-02-07 NOTE — ASSESSMENT & PLAN NOTE
· As evident on CT a/p with severe distension of the colon with gas and stool with a rub transition at the proximal sigmoid  Suspected this could be related to a colonic ileus  Distal esophagus is distended with material    · Gastroenterology input appreciated-  · Underwent EGD and flexible sigmoidoscopy 2/1- unable to visualize due to poor bowel prep  Area of ulceration noted  Biopsy pending  · 2/4 underwent repeat flexible sigmoidoscopy- no mass or obvious stricture  · GI recommend to continue with MiraLax BID  Do not hold even with loose stool as there appears to be angulation and lumen narrowing in sigmoid colon  · Surgery input appreciated   · As the patient does not have any peritoneal signs, recommend to continue with MiraLax  Monitor closely as she is at risk for stercoral perforation which will require urgent laparotomy with resection/diversion of sigmoid colon  Patient is clinically improving  No further surgical intervention indicated at this time  May follow up with surgery as needed outpatient       · Continue with serial abdominal exam   · Supportive care  · Planned discharge for tomorrow 2/8

## 2022-02-07 NOTE — ASSESSMENT & PLAN NOTE
· Likely a component of hypovolemia hyponatremia with recent vomiting  · Received IVF however sodium level still low     · Monitor Na level    · Placed on fluid restriction- sodium stable

## 2022-02-07 NOTE — PROGRESS NOTES
[address]  Progress Note Judy Lara 1945, 77 y o  female MRN: [id number]  Unit/Bed#: -01 Encounter: [id number]  Primary Care Provider: No primary care provider on file  Date and time admitted to hospital: 1/31/2022  1:56 PM    * [facility])  Assessment & Plan  · As evident on CT a/p with severe distension of the colon with gas and stool with a rub transition at the proximal sigmoid  Suspected this could be related to a colonic ileus  Distal esophagus is distended with material    · Gastroenterology input appreciated-  · Underwent EGD and flexible sigmoidoscopy 2/1- unable to visualize due to poor bowel prep  Area of ulceration noted  Biopsy pending  · 2/4 underwent repeat flexible sigmoidoscopy- no mass or obvious stricture  · GI recommend to continue with MiraLax BID  Do not hold even with loose stool as there appears to be angulation and lumen narrowing in sigmoid colon  · Surgery input appreciated   · As the patient does not have any peritoneal signs, recommend to continue with MiraLax  Monitor closely as she is at risk for stercoral perforation which will require urgent laparotomy with resection/diversion of sigmoid colon  Patient is clinically improving  No further surgical intervention indicated at this time  May follow up with surgery as needed outpatient       · Continue with serial abdominal exam   · Supportive care  · Planned discharge for tomorrow 2/8    COVID-19  Assessment & Plan  · Initially did not require any oxygen on admission; but became hypoxia and now on 2L NC - titrate off O2 to keep SpO2 greater than 90%  · Continue mild pathway   · Due to her comorbidity, started on remdesivir completed course on 2/4/22  · Added dexamethasone 6 mg daily on 2/2/22- day #5/10; patient does not require any O2 further and started on PO prednisone   · D-dimers- 1 2- continue on heparin 5000 units t i d   · Will hold off on initiating baricitinib   · CT abdomen and pelvis- without evidence of pneumonia  · Trend inflammatory markers   · Incentive spirometry, prone/side-lying position, cough and deep breathing    · Leukocytosis likely related to steroid induced; afebrile     Suspected UTI  Assessment & Plan  · With increasing urinary frequency  · The patient received 3 days of ceftriaxone  · This is ruled out         Seizure disorder (HCC)  Assessment & Plan  · Valproic acid level-60  · Continue with home regimen          Hyponatremia  Assessment & Plan  · Likely a component of hypovolemia hyponatremia with recent vomiting  · Received IVF however sodium level still low  · Monitor Na level    · Placed on fluid restriction- sodium stable    Acquired hypothyroidism  Assessment & Plan  · TSH - 5 136  · Continue with levothyroxine         Primary hypertension  Assessment & Plan  · Monitor BP closely     · Continue with lisinopril, propranolol and amlodipine       Parkinson's disease (HCC)  Assessment & Plan  · Continue sinemet   · PT/OT evaluation           Other schizophrenia ([facility] [address] )  Assessment & Plan  · Continue with home regimen              VTE Pharmacologic Prophylaxis: VTE Score: 4 Moderate Risk (Score 3-4) - Pharmacological DVT Prophylaxis Ordered: heparin  Patient Centered Rounds: I performed bedside rounds with nursing staff today  Discussions with Specialists or Other Care Team Provider: nursing, CM    Education and Discussions with Family / Patient: patient  Time Spent for Care: 20 minutes  More than 50% of total time spent on counseling and coordination of care as described above  Current Length of Stay: 6 day(s)  Current Patient Status: Inpatient   Certification Statement: The patient will continue to require additional inpatient hospital stay due to discharge planning  Discharge Plan: Anticipate discharge tomorrow to prior assisted or independent living facility  Code Status: Level 1 - Full Code    Subjective: The patient was seen and examined   The patient denies any complaints  Nursing reports bowel movement yesterday     Objective:     Vitals:   Temp (24hrs), Av 8 °F (36 6 °C), Min:97 4 °F (36 3 °C), Max:98 1 °F (36 7 °C)    Temp:  [97 4 °F (36 3 °C)-98 1 °F (36 7 °C)] 98 1 °F (36 7 °C)  HR:  [52-57] 55  Resp:  [17-18] 18  BP: (131-160)/() 140/77  SpO2:  [94 %-96 %] 96 %  Body mass index is 32 91 kg/m²  Input and Output Summary (last 24 hours): Intake/Output Summary (Last 24 hours) at 2022 1054  Last data filed at 2022 0940  Gross per 24 hour   Intake 180 ml   Output 600 ml   Net -420 ml       Physical Exam:   Physical Exam  Vitals and nursing note reviewed  Constitutional:       General: She is awake  Appearance: Normal appearance  Cardiovascular:      Rate and Rhythm: Normal rate and regular rhythm  Pulmonary:      Effort: Pulmonary effort is normal       Breath sounds: Normal breath sounds  Abdominal:      General: Bowel sounds are normal       Palpations: Abdomen is soft  Tenderness: There is no abdominal tenderness  Skin:     General: Skin is warm and dry  Neurological:      General: No focal deficit present  Mental Status: She is alert and oriented to person, place, and time  Psychiatric:         Attention and Perception: Attention normal          Mood and Affect: Mood normal          Speech: Speech normal          Behavior: Behavior is cooperative            Additional Data:     Labs:  Results from last 7 days   Lab Units 22  0436 22  1104 22  0426 22  0432 22  0432   WBC Thousand/uL 14 94*   < > 13 80*   < > 7 00   HEMOGLOBIN g/dL 11 6   < > 12 5   < > 11 7   HEMATOCRIT % 35 3   < > 39 5   < > 35 8   PLATELETS Thousands/uL 275   < > 278   < > 223   BANDS PCT %  --   --   --   --  3   NEUTROS PCT %  --   --  83*  --   --    LYMPHS PCT %  --   --  10*  --   --    LYMPHO PCT %  --   --   --   --  22   MONOS PCT %  --   --  6  --   --    MONO PCT %  --   --   --   -- 12   EOS PCT %  --   --  0  --  0    < > = values in this interval not displayed  Results from last 7 days   Lab Units 02/07/22  0436 02/04/22  1104 02/03/22  0426   SODIUM mmol/L 132*   < > 131*   POTASSIUM mmol/L 5 1   < > 4 9   CHLORIDE mmol/L 95*   < > 97   CO2 mmol/L 31   < > 26   BUN mg/dL 22   < > 26*   CREATININE mg/dL 0 68   < > 0 64   ANION GAP mmol/L 6   < > 8   CALCIUM mg/dL 8 9   < > 9 1   ALBUMIN g/dL  --   --  3 4*   TOTAL BILIRUBIN mg/dL  --   --  0 25   ALK PHOS U/L  --   --  70   ALT U/L  --   --  <3*   AST U/L  --   --  14   GLUCOSE RANDOM mg/dL 132   < > 179*    < > = values in this interval not displayed  Results from last 7 days   Lab Units 01/31/22  1451   INR  0 99     Results from last 7 days   Lab Units 02/07/22  0557 02/06/22  2333 02/06/22  1637 02/06/22  1103 02/06/22  0646 02/05/22  2259 02/05/22  1629 02/04/22  2028 02/04/22  1539 02/04/22  1126 02/04/22  0638 02/04/22  0026   POC GLUCOSE mg/dl 120 186* 147* 101 78 112 139 175* 143* 116 172* 165*         Results from last 7 days   Lab Units 02/01/22  0432 01/31/22  1919   LACTIC ACID mmol/L  --  0 5   PROCALCITONIN ng/ml <0 05 <0 05       Lines/Drains:  Invasive Devices  Report    Drain            External Urinary Catheter <1 day                      Imaging: No pertinent imaging reviewed      Recent Cultures (last 7 days):         Last 24 Hours Medication List:   Current Facility-Administered Medications   Medication Dose Route Frequency Provider Last Rate    acetaminophen  650 mg Oral Q6H PRN Roshan Cleverly, CRNP      amLODIPine  10 mg Oral Daily Roshan Cleverly, CRNP      atorvastatin  40 mg Oral Daily Roshan Cleverly, CRNP      carbidopa-levodopa  1 tablet Oral BID Roshan Cleverly, CRNP      divalproex sodium  500 mg Oral HS Roshan Cleverly, CRNP      divalproex sodium  250 mg Oral Daily Roshan Cleverly, CRNP      gabapentin  100 mg Oral TID Roshan Cleverly, CRNP      heparin (porcine)  5,000 Units Subcutaneous Q8H [address] Mamie Jennifer, CRNP      levothyroxine  100 mcg Oral Early Morning Mamie Jennifer, CRNP      meclizine  12 5 mg Oral Daily Mamie Jennifer, CRNP      OLANZapine  7 5 mg Oral HS Mamie Jennifer, CRNP      polyethylene glycol  17 g Oral BID Mamie Jennifer, CRNP      [START ON 2/8/2022] predniSONE  10 mg Oral Daily Mamie Jennifer, CRNP      propranolol  10 mg Oral BID Mamie Jennifer, CRNP      zolpidem  5 mg Oral HS Mamie Jennifer, CRNP          Today, Patient Was Seen By: Genna Neal PA-C    **Please Note: This note may have been constructed using a voice recognition system  **

## 2022-02-07 NOTE — ASSESSMENT & PLAN NOTE
· Initially did not require any oxygen on admission; but became hypoxia and now on 2L NC - titrate off O2 to keep SpO2 greater than 90%  · Continue mild pathway   · Due to her comorbidity, started on remdesivir completed course on 2/4/22  · Added dexamethasone 6 mg daily on 2/2/22- day #5/10; patient does not require any O2 further and started on PO prednisone   · D-dimers- 1 2- continue on heparin 5000 units t i d   · Will hold off on initiating baricitinib   · CT abdomen and pelvis- without evidence of pneumonia  · Trend inflammatory markers   · Incentive spirometry, prone/side-lying position, cough and deep breathing    · Leukocytosis likely related to steroid induced; afebrile

## 2022-02-07 NOTE — PLAN OF CARE
Problem: PAIN - ADULT  Goal: Verbalizes/displays adequate comfort level or baseline comfort level  Description: Interventions:  - Encourage patient to monitor pain and request assistance  - Assess pain using appropriate pain scale  - Administer analgesics based on type and severity of pain and evaluate response  - Implement non-pharmacological measures as appropriate and evaluate response  - Consider cultural and social influences on pain and pain management  - Notify physician/advanced practitioner if interventions unsuccessful or patient reports new pain  Outcome: Progressing     Problem: INFECTION - ADULT  Goal: Absence or prevention of progression during hospitalization  Description: INTERVENTIONS:  - Assess and monitor for signs and symptoms of infection  - Monitor lab/diagnostic results  - Monitor all insertion sites, i e  indwelling lines, tubes, and drains  - Monitor endotracheal if appropriate and nasal secretions for changes in amount and color  - [place] appropriate cooling/warming therapies per order  - Administer medications as ordered  - Instruct and encourage patient and family to use good hand hygiene technique  - Identify and instruct in appropriate isolation precautions for identified infection/condition  Outcome: Progressing  Goal: Absence of fever/infection during neutropenic period  Description: INTERVENTIONS:  - Monitor WBC    Outcome: Progressing     Problem: Prexisting or High Potential for Compromised Skin Integrity  Goal: Skin integrity is maintained or improved  Description: INTERVENTIONS:  - Identify patients at risk for skin breakdown  - Assess and monitor skin integrity  - Assess and monitor nutrition and hydration status  - Monitor labs   - Assess for incontinence   - Turn and reposition patient  - Assist with mobility/ambulation  - Relieve pressure over bony prominences  - Avoid friction and shearing  - Provide appropriate hygiene as needed including keeping skin clean and dry  - Evaluate need for skin moisturizer/barrier cream  - Collaborate with interdisciplinary team   - Patient/family teaching  - Consider wound care consult   Outcome: Progressing

## 2022-02-07 NOTE — CASE MANAGEMENT
Case Management Discharge Planning Note    Patient name Keiry Lynn  Location [address] 206/-57 MRN [id number]  : 1945 Date 2022       Current Admission Date: 2022  Current Admission Diagnosis:[facility])   Patient Active Problem List    Diagnosis Date Noted    Suspected UTI 2022    [facility]    Parkinson's disease ([facility] ) 2022    Primary hypertension 2022    Urinary incontinence 2022    Acquired hypothyroidism 2022    COVID-19 2022    I[facility]us ([facility] ) 2022    Hyponatremia 2022    Seizure disorder ([facility] ) 2022      LOS (days): 6  Geometric Mean LOS (GMLOS) (days): 4 70  Days to GMLOS:-1 4     OBJECTIVE:  Risk of Unplanned Readmission Score: 15         Current admission status: Inpatient   Preferred Pharmacy: No Pharmacies Listed  Primary Care Provider: No primary care provider on file  Primary Insurance: MEDICARE  [place]    DISCHARGE DETAILS:    Discharge planning discussed with[de-identified] Yan Jacobo of Choice: Yes  Comments - Freedom of Choice: pt for d/c back to [address] [place] [address] on   Niall Josef at [facility] aware and reports pt able to return 2/8  CM also made [facility] aware of plan for d/c tomorrow  Pt MORGANA Jorden Stanford aware of plan for d c back to [facility] tomorrow  and in agreement  She is aware of plan for w/c [facility] transport and cost associated with same                  Contacts  Patient Contacts: Roberto KRISHNAN  Relationship to Patient[de-identified] Other (Comment)  Contact Method: Phone  Phone Number: [phone]  Reason/Outcome: Discharge [address]         Is the patient interested in Dariojan[facility] Ocasio at discharge?: Yes  [address] requested[de-identified] [place] Name[de-identified] Other ([facility])  [address] Provider[de-identified] PCP  Home Health Services Needed[de-identified] Strengthening/Theraputic Exercises to Improve Function,Gait/ADL Training,Evaluate Functional Status and Safety  Homebound Criteria Met[de-identified] Requires the Assistance of Another Person for Safe Ambulation or to Leave the Home,Uses an Assist Device (i e  cane, walker, etc)  Supporting Clincal Findings[de-identified] Limited Endurance,Fatigues Easliy in Short Distances              Would you like to participate in our [address] service program?  : No - Declined    Treatment Team Recommendation: Home with [facility]  Discharge Destination Plan[de-identified] Home with Gwen at Discharge : [address] [facility] of Transport (Date): 02/08/22  ETA of Transport (Time): 1000     Transfer Mode: Wheelchair        IMM Given (Date):: 02/07/22  IMM Given to[de-identified] Designee  Family notified[de-identified] [employer]

## 2022-02-08 VITALS
RESPIRATION RATE: 18 BRPM | TEMPERATURE: 97.5 F | OXYGEN SATURATION: 92 % | BODY MASS INDEX: 32.77 KG/M2 | WEIGHT: 203.93 LBS | HEIGHT: 66 IN | HEART RATE: 53 BPM | DIASTOLIC BLOOD PRESSURE: 75 MMHG | SYSTOLIC BLOOD PRESSURE: 142 MMHG

## 2022-02-08 LAB
ANION GAP SERPL CALCULATED.3IONS-SCNC: 3 MMOL/L (ref 4–13)
BASOPHILS # BLD MANUAL: 0 THOUSAND/UL (ref 0–0.1)
BASOPHILS NFR MAR MANUAL: 0 % (ref 0–1)
BUN SERPL-MCNC: 25 MG/DL (ref 5–25)
BURR CELLS BLD QL SMEAR: PRESENT
CALCIUM SERPL-MCNC: 8.8 MG/DL (ref 8.4–10.2)
CHLORIDE SERPL-SCNC: 97 MMOL/L (ref 96–108)
CO2 SERPL-SCNC: 32 MMOL/L (ref 21–32)
CREAT SERPL-MCNC: 0.69 MG/DL (ref 0.6–1.3)
EOSINOPHIL # BLD MANUAL: 0.16 THOUSAND/UL (ref 0–0.4)
EOSINOPHIL NFR BLD MANUAL: 1 % (ref 0–6)
ERYTHROCYTE [DISTWIDTH] IN BLOOD BY AUTOMATED COUNT: 15.7 % (ref 11.6–15.1)
GFR SERPL CREATININE-BSD FRML MDRD: 85 ML/MIN/1.73SQ M
GLUCOSE SERPL-MCNC: 107 MG/DL (ref 65–140)
HCT VFR BLD AUTO: 34.4 % (ref 34.8–46.1)
HGB BLD-MCNC: 11.4 G/DL (ref 11.5–15.4)
LYMPHOCYTES # BLD AUTO: 26 % (ref 14–44)
LYMPHOCYTES # BLD AUTO: 4.28 THOUSAND/UL (ref 0.6–4.47)
MCH RBC QN AUTO: 28.6 PG (ref 26.8–34.3)
MCHC RBC AUTO-ENTMCNC: 33.1 G/DL (ref 31.4–37.4)
MCV RBC AUTO: 86 FL (ref 82–98)
METAMYELOCYTES NFR BLD MANUAL: 5 % (ref 0–1)
MONOCYTES # BLD AUTO: 0.99 THOUSAND/UL (ref 0–1.22)
MONOCYTES NFR BLD: 6 % (ref 4–12)
NEUTROPHILS # BLD MANUAL: 10.22 THOUSAND/UL (ref 1.85–7.62)
NEUTS SEG NFR BLD AUTO: 62 % (ref 43–75)
PLATELET # BLD AUTO: 216 THOUSANDS/UL (ref 149–390)
PLATELET BLD QL SMEAR: ADEQUATE
PMV BLD AUTO: 8.8 FL (ref 8.9–12.7)
POLYCHROMASIA BLD QL SMEAR: PRESENT
POTASSIUM SERPL-SCNC: 4.7 MMOL/L (ref 3.5–5.3)
RBC # BLD AUTO: 3.98 MILLION/UL (ref 3.81–5.12)
RBC MORPH BLD: PRESENT
SODIUM SERPL-SCNC: 132 MMOL/L (ref 135–147)
WBC # BLD AUTO: 16.48 THOUSAND/UL (ref 4.31–10.16)

## 2022-02-08 PROCEDURE — 80048 BASIC METABOLIC PNL TOTAL CA: CPT | Performed by: PHYSICIAN ASSISTANT

## 2022-02-08 PROCEDURE — 99238 HOSP IP/OBS DSCHRG MGMT 30/<: CPT | Performed by: PHYSICIAN ASSISTANT

## 2022-02-08 PROCEDURE — 85027 COMPLETE CBC AUTOMATED: CPT | Performed by: PHYSICIAN ASSISTANT

## 2022-02-08 PROCEDURE — 85007 BL SMEAR W/DIFF WBC COUNT: CPT | Performed by: PHYSICIAN ASSISTANT

## 2022-02-08 RX ORDER — POLYETHYLENE GLYCOL 3350 17 G/17G
17 POWDER, FOR SOLUTION ORAL 2 TIMES DAILY
Refills: 0
Start: 2022-02-08

## 2022-02-08 RX ADMIN — MECLIZINE 12.5 MG: 12.5 TABLET ORAL at 08:16

## 2022-02-08 RX ADMIN — GABAPENTIN 100 MG: 100 CAPSULE ORAL at 08:17

## 2022-02-08 RX ADMIN — AMLODIPINE BESYLATE 10 MG: 10 TABLET ORAL at 08:17

## 2022-02-08 RX ADMIN — CARBIDOPA AND LEVODOPA 1 TABLET: 25; 100 TABLET ORAL at 08:17

## 2022-02-08 RX ADMIN — PROPRANOLOL HYDROCHLORIDE 10 MG: 10 TABLET ORAL at 08:16

## 2022-02-08 RX ADMIN — HEPARIN SODIUM 5000 UNITS: 5000 INJECTION INTRAVENOUS; SUBCUTANEOUS at 02:40

## 2022-02-08 RX ADMIN — LEVOTHYROXINE SODIUM 100 MCG: 100 TABLET ORAL at 05:11

## 2022-02-08 RX ADMIN — POLYETHYLENE GLYCOL 3350 17 G: 17 POWDER, FOR SOLUTION ORAL at 08:16

## 2022-02-08 RX ADMIN — PREDNISONE 10 MG: 10 TABLET ORAL at 08:16

## 2022-02-08 RX ADMIN — DIVALPROEX SODIUM 250 MG: 250 TABLET, DELAYED RELEASE ORAL at 08:17

## 2022-02-08 RX ADMIN — ATORVASTATIN CALCIUM 40 MG: 40 TABLET, FILM COATED ORAL at 08:17

## 2022-02-08 NOTE — ASSESSMENT & PLAN NOTE
· Likely a component of hypovolemia hyponatremia with recent vomiting  · Received IVF however sodium level still low     · Placed on fluid restriction- sodium stable

## 2022-02-08 NOTE — PLAN OF CARE
Problem: MOBILITY - ADULT  Goal: Maintain or return to baseline ADL function  Description: INTERVENTIONS:  -  Assess patient's ability to carry out ADLs; assess patient's baseline for ADL function and identify physical deficits which impact ability to perform ADLs (bathing, care of mouth/teeth, toileting, grooming, dressing, etc )  - Assess/evaluate cause of self-care deficits   - Assess range of motion  - Assess patient's mobility; develop plan if impaired  - Assess patient's need for assistive devices and provide as appropriate  - Encourage maximum independence but intervene and supervise when necessary  - Involve family in performance of ADLs  - Assess for home care needs following discharge   - Consider OT consult to assist with ADL evaluation and planning for discharge  - Provide patient education as appropriate  Outcome: Progressing  Goal: Maintains/Returns to pre admission functional level  Description: INTERVENTIONS:  - Perform BMAT or MOVE assessment daily    - Set and communicate daily mobility goal to care team and patient/family/caregiver     - Collaborate with rehabilitation services on mobility goals if consulted  - Out of bed for toileting  - Record patient progress and toleration of activity level   Outcome: Progressing     Problem: Prexisting or High Potential for Compromised Skin Integrity  Goal: Skin integrity is maintained or improved  Description: INTERVENTIONS:  - Identify patients at risk for skin breakdown  - Assess and monitor skin integrity  - Assess and monitor nutrition and hydration status  - Monitor labs   - Assess for incontinence   - Turn and reposition patient  - Assist with mobility/ambulation  - Relieve pressure over bony prominences  - Avoid friction and shearing  - Provide appropriate hygiene as needed including keeping skin clean and dry  - Evaluate need for skin moisturizer/barrier cream  - Collaborate with interdisciplinary team   - Patient/family teaching  - Consider wound care consult   Outcome: Progressing     Problem: PAIN - ADULT  Goal: Verbalizes/displays adequate comfort level or baseline comfort level  Description: Interventions:  - Encourage patient to monitor pain and request assistance  - Assess pain using appropriate pain scale  - Administer analgesics based on type and severity of pain and evaluate response  - Implement non-pharmacological measures as appropriate and evaluate response  - Consider cultural and social influences on pain and pain management  - Notify physician/advanced practitioner if interventions unsuccessful or patient reports new pain  Outcome: Progressing     Problem: INFECTION - ADULT  Goal: Absence or prevention of progression during hospitalization  Description: INTERVENTIONS:  - Assess and monitor for signs and symptoms of infection  - Monitor lab/diagnostic results  - Monitor all insertion sites, i e  indwelling lines, tubes, and drains  - Monitor endotracheal if appropriate and nasal secretions for changes in amount and color  - [place] appropriate cooling/warming therapies per order  - Administer medications as ordered  - Instruct and encourage patient and family to use good hand hygiene technique  - Identify and instruct in appropriate isolation precautions for identified infection/condition  Outcome: Progressing  Goal: Absence of fever/infection during neutropenic period  Description: INTERVENTIONS:  - Monitor WBC    Outcome: Progressing     Problem: SAFETY ADULT  Goal: Patient will remain free of falls  Description: INTERVENTIONS:  - Educate patient/family on patient safety including physical limitations  - Instruct patient to call for assistance with activity   - Consult OT/PT to assist with strengthening/mobility   - Keep Call bell within reach  - Keep bed low and locked with side rails adjusted as appropriate  - Keep care items and personal belongings within reach  - Initiate and maintain comfort rounds  - Make Fall Risk Sign visible to staff  - Offer Toileting in advance of need  - Initiate/Maintain bed alarm  - Obtain necessary fall risk management equipment:   - Apply yellow socks and bracelet for high fall risk patients  - Consider moving patient to room near nurses station  Outcome: Progressing     Problem: DISCHARGE PLANNING  Goal: Discharge to home or other facility with appropriate resources  Description: INTERVENTIONS:  - Identify barriers to discharge w/patient and caregiver  - Arrange for needed discharge resources and transportation as appropriate  - Identify discharge learning needs (meds, wound care, etc )  - Refer to Case Management Department for coordinating discharge planning if the patient needs post-hospital services based on physician/advanced practitioner order or complex needs related to functional status, cognitive ability, or social support system  Outcome: Progressing     Problem: Knowledge Deficit  Goal: Patient/family/caregiver demonstrates understanding of disease process, treatment plan, medications, and discharge instructions  Description: Complete learning assessment and assess knowledge base    Interventions:  - Provide teaching at level of understanding  - Provide teaching via preferred learning methods  Outcome: Progressing     Problem: Potential for Falls  Goal: Patient will remain free of falls  Description: INTERVENTIONS:  - Educate patient/family on patient safety including physical limitations  - Instruct patient to call for assistance with activity   - Consult OT/PT to assist with strengthening/mobility   - Keep Call bell within reach  - Keep bed low and locked with side rails adjusted as appropriate  - Keep care items and personal belongings within reach  - Initiate and maintain comfort rounds  - Make Fall Risk Sign visible to staff  - Offer Toileting in advance of need  - Initiate/Maintain bed alarm  - Obtain necessary fall risk management equipment:   - Apply yellow socks and bracelet for high fall risk patients  - Consider moving patient to room near nurses station  Outcome: Progressing     Problem: Nutrition/Hydration-ADULT  Goal: Nutrient/Hydration intake appropriate for improving, restoring or maintaining nutritional needs  Description: Monitor and assess patient's nutrition/hydration status for malnutrition  Collaborate with interdisciplinary team and initiate plan and interventions as ordered  Monitor patient's weight and dietary intake as ordered or per policy  Utilize nutrition screening tool and intervene as necessary  Determine patient's food preferences and provide high-protein, high-caloric foods as appropriate       INTERVENTIONS:  - Monitor oral intake, urinary output, labs, and treatment plans  - Assess nutrition and hydration status and recommend course of action  - Evaluate amount of meals eaten  - Assist patient with eating if necessary   - Allow adequate time for meals  - Recommend/ encourage appropriate diets, oral nutritional supplements, and vitamin/mineral supplements  - Order, calculate, and assess calorie counts as needed  - Recommend, monitor, and adjust tube feedings and TPN/PPN based on assessed needs  - Assess need for intravenous fluids  - Provide specific nutrition/hydration education as appropriate  - Include patient/family/caregiver in decisions related to nutrition  Outcome: Progressing

## 2022-02-08 NOTE — DISCHARGE SUMMARY
[address]  Discharge- Lashae Avila 1945, 77 y o  female MRN: [id number]  Unit/Bed#: -01 Encounter: [id number]  Primary Care Provider: No primary care provider on file  Date and time admitted to hospital: 1/31/2022  1:56 PM    * Ileus [facility])  Assessment & Plan  · As evident on CT a/p with severe distension of the colon with gas and stool with a rub transition at the proximal sigmoid  Suspected this could be related to a colonic ileus  Distal esophagus is distended with material    · Gastroenterology input appreciated-  · Underwent EGD and flexible sigmoidoscopy 2/1- unable to visualize due to poor bowel prep  Area of ulceration noted  Biopsy pending  · 2/4 underwent repeat flexible sigmoidoscopy- no mass or obvious stricture  · GI recommend to continue with MiraLax BID  Do not hold even with loose stool as there appears to be angulation and lumen narrowing in sigmoid colon  · Outpatient follow-up with GI    · Surgery input appreciated   · As the patient does not have any peritoneal signs, recommend to continue with MiraLax  Monitor closely as she is at risk for stercoral perforation which will require urgent laparotomy with resection/diversion of sigmoid colon  Patient is clinically improving  No further surgical intervention indicated at this time  May follow up with surgery as needed outpatient  · Continue with serial abdominal exam   · Supportive care  · Planned discharge for tomorrow 2/8    COVID-19  Assessment & Plan  · Initially did not require any oxygen on admission; but became hypoxia requiring 2L NC -weaned off oxygen   · Admitted on mild pathway   · Due to her comorbidity, started on remdesivir completed course on 2/4/22  · Added dexamethasone 6 mg daily on 2/2/22; on 2/5/22 patient was weaned off O2 and started on PO prednisone- patient completed prednisone taper on day of discharge    · D-dimers- 1 2- patient received heparin 5000 units t i d  during admission  · CT abdomen and pelvis- without evidence of pneumonia  · Leukocytosis likely related to steroid induced; afebrile   · Outpatient follow-up with PCP    Suspected UTI  Assessment & Plan  · With increasing urinary frequency  · The patient received 3 days of ceftriaxone  · This is ruled out         Seizure disorder [facility])  Assessment & Plan  · Valproic acid level-60  · Continue with home regimen          Hyponatremia  Assessment & Plan  · Likely a component of hypovolemia hyponatremia with recent vomiting  · Received IVF however sodium level still low  · Placed on fluid restriction- sodium stable    Acquired hypothyroidism  Assessment & Plan  · TSH - 5 136  · Continue with levothyroxine         Primary hypertension  Assessment & Plan  · Continue with lisinopril, propranolol and amlodipine       Parkinson's disease ([facility] [address] )  Assessment & Plan  · Continue sinemet      Other schizophrenia [facility])  Assessment & Plan  · Continue with home regimen            Medical Problems             Resolved Problems  Date Reviewed: 2/8/2022          Resolved    Hypoglycemia 2/6/2022     Resolved by  Santiago Tucker, [address]              Discharging Physician / Practitioner: Zena Barcenas PA-C  PCP: No primary care provider on file  Admission Date:   Admission Orders (From admission, onward)     Ordered        02/01/22 1002  Inpatient Admission  Once            01/31/22 1711  Place in Observation  Once                      Discharge Date: 02/08/22    Consultations During Hospital Stay:  · Gastroenterology  · General surgery    Procedures Performed:   · EGD 2/1/2022  · Flexible Sigmoidoscopy 2/4/2022    Significant Findings / Test Results:   EGD    Result Date: 2/1/2022  Impression: No retained material in the esophagus as had been seen on CT Hiatal hernia  Presbyesophagus Otherwise normal EGD RECOMMENDATION: There is no recommended follow-up for this procedure     Moisés Chen MD     CT head without contrast    Result Date: 1/31/2022  Impression: Probable 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, age indeterminate  Otherwise, no evidence of acute intracranial abnormality  Workstation performed: [id number]     CT abdomen pelvis with contrast    Result Date: 1/31/2022  Impression: 1  Severe distention of the colon with gas and stool with abrupt transition at the proximal sigmoid  While a colonic ileus can be considered, a subtle obstructing process at the proximal sigmoid colon is not excluded and follow-up colonoscopy is recommended  2   The visualized distal esophagus is distended with material   Uncertain if this is related to the clinical history of vomiting  Cannot exclude a distal esophageal process such as achalasia  The study was marked in [facility] for immediate notification  Workstation performed: [id number]     Flexible Sigmoidoscopy    Result Date: 2/4/2022  Impression: Etiology of colonic obstruction unclear  No mass or focal stricture though there may be area of narrowing and angulation at 30cm RECOMMENDATION: No further screening sigmoidoscopies necessary due to life expectancy < 10 years  [facility] from GI perspective for solid diet Miralax twice daily  Avoid stimulant laxatives  Leighton Zaman MD     Flexible Sigmoidoscopy    Result Date: 2/1/2022  · Impression: See EGD report 2/1/22 for pictures Solid stool present upon entry despite tap water enema x2  Endoscope could not be advanced past 30cm due to solid stool obstructing lumen  Area of ulcerated, erythematous mucosa at the extent of exam, unclear if stricture or mass present as solid, immobile stool obscured views  Biopsies taken of ulcerated mucosa  RECOMMENDATION: Await pathology results due to inadequate prep  Start cautious bowel cleanse with miralax (14 packets in 64oz liquid with patient slowly consuming this)  If worsening distension or abdominal pain, stop PO intake and notify GI   Ok for clear liquid diet Will likely need repeat colonoscopy  Will determine timing based on response to bowel cleanse, abdominal exam, and biopsy results  Reyna Ansari MD   ·     Incidental Findings:   · none     Test Results Pending at Discharge (will require follow up): · Biopsy results     Outpatient Tests Requested:  · none    Complications:  none    Reason for Admission: ileus    Hospital Course:   Lalo Cortez is a 77 y o  female patient who originally presented to the hospital on 1/31/2022 due to generalized weakness  Per HPI "Lalo Cortez is a 77 y o  female who presents with generalized weakness  The patient past medical history of Parkinson's, seizure disorder, and hypertension  She is a resident of [facility] who was brought in to the ED with increasing generalized weakness and urinary frequency  The patient is somewhat a poor historian  She states that she has been feeling okay however after lunch time she developed nausea and had 1 episodes of nonbloody vomiting  She complains of some mild generalized abdominal pain than however this has resolved currently  She denies any fevers, chills, nausea, vomiting, abdominal pain currently  In the ED, the patient was found to have COVID-19 positive  She does not require any oxygen currently  CT scan of the abdomen and pelvis shows suspected colonic ileus with distended distal esophagus"  Please see above list of diagnoses and related plan for additional information  Condition at Discharge: fair    Discharge Day Visit / Exam:   Subjective:    Vitals: Blood Pressure: 142/75 (02/08/22 0741)  Pulse: (!) 53 (02/08/22 0741)  Temperature: 97 5 °F (36 4 °C) (02/08/22 0741)  Temp Source: Tympanic (02/08/22 0741)  Respirations: 18 (02/08/22 0741)  Height: 5' 6" (167 6 cm) (01/31/22 2019)  Weight - Scale: 92 5 kg (203 lb 14 8 oz) (01/31/22 2019)  SpO2: 92 % (02/08/22 0741)  Exam:   Physical Exam  Vitals and nursing note reviewed  Constitutional:       Appearance: Normal appearance  HENT:      Head: Normocephalic and atraumatic  Cardiovascular:      Rate and Rhythm: Normal rate and regular rhythm  Pulmonary:      Effort: Pulmonary effort is normal       Breath sounds: Normal breath sounds  Abdominal:      General: There is no distension  Palpations: Abdomen is soft  Tenderness: There is no abdominal tenderness  Skin:     General: Skin is warm and dry  Neurological:      General: No focal deficit present  Mental Status: She is alert and oriented to person, place, and time  Discharge instructions/Information to patient and family:   See after visit summary for information provided to patient and family  Provisions for Follow-Up Care:  See after visit summary for information related to follow-up care and any pertinent home health orders  Disposition:   Long Term Acute Care (LTAC) Facility at [address] [place] [address] Readmission: no     Discharge Statement:  I spent 25 minutes discharging the patient  This time was spent on the day of discharge  I had direct contact with the patient on the day of discharge  Greater than 50% of the total time was spent examining patient, answering all patient questions, arranging and discussing plan of care with patient as well as directly providing post-discharge instructions  Additional time then spent on discharge activities  Discharge Medications:  See after visit summary for reconciled discharge medications provided to patient and/or family        **Please Note: This note may have been constructed using a voice recognition system**

## 2022-02-08 NOTE — ASSESSMENT & PLAN NOTE
· As evident on CT a/p with severe distension of the colon with gas and stool with a rub transition at the proximal sigmoid  Suspected this could be related to a colonic ileus  Distal esophagus is distended with material    · Gastroenterology input appreciated-  · Underwent EGD and flexible sigmoidoscopy 2/1- unable to visualize due to poor bowel prep  Area of ulceration noted  Biopsy pending  · 2/4 underwent repeat flexible sigmoidoscopy- no mass or obvious stricture  · GI recommend to continue with MiraLax BID  Do not hold even with loose stool as there appears to be angulation and lumen narrowing in sigmoid colon  · Outpatient follow-up with GI    · Surgery input appreciated   · As the patient does not have any peritoneal signs, recommend to continue with MiraLax  Monitor closely as she is at risk for stercoral perforation which will require urgent laparotomy with resection/diversion of sigmoid colon  Patient is clinically improving  No further surgical intervention indicated at this time  May follow up with surgery as needed outpatient       · Continue with serial abdominal exam   · Supportive care  · Planned discharge for tomorrow 2/8

## 2022-02-08 NOTE — CASE MANAGEMENT
Case Management Discharge Planning Note    Patient name Eryn Parents  Location [id number] MRN [id number]  : 1945 Date 2022       Current Admission Date: 2022  Current Admission Diagnosis:Ileus [facility])   Patient Active Problem List    Diagnosis Date Noted    Suspected UTI 2022    [facility] 20[facility]    Parkinson's disease ([facility] ) 2022    Primary hypertension 2022    Urinary incontinence 2022    Acquired hypothyroidism 2022    COVID-19 2022    [facility] 2022    Hyponatremia 2022    Seizure disorder ([facility] ) 2022      LOS (days): 7  Geometric Mean LOS (GMLOS) (days): 4 70  Days to GMLOS:-2 2     OBJECTIVE:  Risk of Unplanned Readmission Score: 17         Current admission status: Inpatient   Preferred Pharmacy: No Pharmacies Listed  Primary Care Provider: No primary care provider on file  Primary Insurance: MEDICARE  Secondary Insurance: [place] [employer]    DISCHARGE DETAILS:          Comments - Freedom of Choice: Pt for d/c back to [address] [place] [address] today  Anali Poon at [address] [place] [address] aware and reports pt able to return today  CM also made [facility] aware of plan for d/c today  Pt EULOGIO Jefferson aware of plan for d/c back to [facility] today  and in agreement  She is aware of plan for w/c [facility] transport and cost associated with same  [facility] form [facility] aware and will d/c  Pt nurse aware of transport time                                               Transport at Discharge : Wheelchair [employer] by Veronica and Unit #):  Rao w/c Jolene Hamming of Transport (Date): 22  ETA of Transport (Time): 1000     Transfer Mode: Wheelchair

## 2022-02-08 NOTE — ASSESSMENT & PLAN NOTE
· Initially did not require any oxygen on admission; but became hypoxia requiring 2L NC -weaned off oxygen   · Admitted on mild pathway   · Due to her comorbidity, started on remdesivir completed course on 2/4/22  · Added dexamethasone 6 mg daily on 2/2/22; on 2/5/22 patient was weaned off O2 and started on PO prednisone- patient completed prednisone taper on day of discharge    · D-dimers- 1 2- patient received heparin 5000 units t i d  during admission  · CT abdomen and pelvis- without evidence of pneumonia  · Leukocytosis likely related to steroid induced; afebrile   · Outpatient follow-up with PCP

## 2022-10-15 ENCOUNTER — APPOINTMENT (EMERGENCY)
Dept: RADIOLOGY | Facility: HOSPITAL | Age: 77
DRG: 441 | End: 2022-10-15
Payer: COMMERCIAL

## 2022-10-15 ENCOUNTER — HOSPITAL ENCOUNTER (INPATIENT)
Facility: HOSPITAL | Age: 77
LOS: 3 days | Discharge: NURSING, REHAB OR LONG-TERM CARE | DRG: 441 | End: 2022-10-19
Attending: EMERGENCY MEDICINE | Admitting: INTERNAL MEDICINE
Payer: COMMERCIAL

## 2022-10-15 DIAGNOSIS — K56.7 ILEUS (HCC): ICD-10-CM

## 2022-10-15 DIAGNOSIS — G40.909 SEIZURE DISORDER (HCC): ICD-10-CM

## 2022-10-15 DIAGNOSIS — F20.89 OTHER SCHIZOPHRENIA (HCC): ICD-10-CM

## 2022-10-15 DIAGNOSIS — G93.40 ACUTE ENCEPHALOPATHY: Primary | ICD-10-CM

## 2022-10-15 PROBLEM — J96.00 RESPIRATORY FAILURE, ACUTE (HCC): Status: ACTIVE | Noted: 2022-10-15

## 2022-10-15 PROBLEM — D69.6 THROMBOCYTOPENIA (HCC): Status: ACTIVE | Noted: 2022-10-15

## 2022-10-15 PROBLEM — N17.9 AKI (ACUTE KIDNEY INJURY) (HCC): Status: ACTIVE | Noted: 2022-10-15

## 2022-10-15 PROBLEM — E72.20 HYPERAMMONEMIA (HCC): Status: ACTIVE | Noted: 2022-10-15

## 2022-10-15 LAB
2HR DELTA HS TROPONIN: -1 NG/L
ALBUMIN SERPL BCP-MCNC: 2 G/DL (ref 3.5–5)
ALP SERPL-CCNC: 73 U/L (ref 46–116)
ALT SERPL W P-5'-P-CCNC: 58 U/L (ref 12–78)
AMMONIA PLAS-SCNC: 60 UMOL/L (ref 11–35)
ANION GAP SERPL CALCULATED.3IONS-SCNC: 6 MMOL/L (ref 4–13)
AST SERPL W P-5'-P-CCNC: 61 U/L (ref 5–45)
BACTERIA UR QL AUTO: ABNORMAL /HPF
BASE EX.OXY STD BLDV CALC-SCNC: 93.6 % (ref 60–80)
BASE EXCESS BLDV CALC-SCNC: -2 MMOL/L
BASOPHILS # BLD AUTO: 0.02 THOUSANDS/ÂΜL (ref 0–0.1)
BASOPHILS NFR BLD AUTO: 0 % (ref 0–1)
BILIRUB SERPL-MCNC: 0.44 MG/DL (ref 0.2–1)
BILIRUB UR QL STRIP: NEGATIVE
BUN SERPL-MCNC: 52 MG/DL (ref 5–25)
CALCIUM ALBUM COR SERPL-MCNC: 10.5 MG/DL (ref 8.3–10.1)
CALCIUM SERPL-MCNC: 8.9 MG/DL (ref 8.3–10.1)
CARDIAC TROPONIN I PNL SERPL HS: 8 NG/L
CARDIAC TROPONIN I PNL SERPL HS: 9 NG/L
CHLORIDE SERPL-SCNC: 112 MMOL/L (ref 96–108)
CK MB SERPL-MCNC: <1 % (ref 0–2.5)
CK MB SERPL-MCNC: <1 NG/ML (ref 0–5)
CK SERPL-CCNC: 187 U/L (ref 26–192)
CLARITY UR: ABNORMAL
CO2 SERPL-SCNC: 23 MMOL/L (ref 21–32)
COLOR UR: YELLOW
CREAT SERPL-MCNC: 1.24 MG/DL (ref 0.6–1.3)
EOSINOPHIL # BLD AUTO: 0.01 THOUSAND/ÂΜL (ref 0–0.61)
EOSINOPHIL NFR BLD AUTO: 0 % (ref 0–6)
ERYTHROCYTE [DISTWIDTH] IN BLOOD BY AUTOMATED COUNT: 15.8 % (ref 11.6–15.1)
GFR SERPL CREATININE-BSD FRML MDRD: 42 ML/MIN/1.73SQ M
GLUCOSE SERPL-MCNC: 202 MG/DL (ref 65–140)
GLUCOSE UR STRIP-MCNC: NEGATIVE MG/DL
HCO3 BLDV-SCNC: 22 MMOL/L (ref 24–30)
HCT VFR BLD AUTO: 31.6 % (ref 34.8–46.1)
HGB BLD-MCNC: 10 G/DL (ref 11.5–15.4)
HGB UR QL STRIP.AUTO: ABNORMAL
HYALINE CASTS #/AREA URNS LPF: ABNORMAL /LPF
IMM GRANULOCYTES # BLD AUTO: 0.07 THOUSAND/UL (ref 0–0.2)
IMM GRANULOCYTES NFR BLD AUTO: 1 % (ref 0–2)
KETONES UR STRIP-MCNC: NEGATIVE MG/DL
LEUKOCYTE ESTERASE UR QL STRIP: ABNORMAL
LYMPHOCYTES # BLD AUTO: 0.96 THOUSANDS/ÂΜL (ref 0.6–4.47)
LYMPHOCYTES NFR BLD AUTO: 12 % (ref 14–44)
MCH RBC QN AUTO: 28 PG (ref 26.8–34.3)
MCHC RBC AUTO-ENTMCNC: 31.6 G/DL (ref 31.4–37.4)
MCV RBC AUTO: 89 FL (ref 82–98)
MONOCYTES # BLD AUTO: 1.2 THOUSAND/ÂΜL (ref 0.17–1.22)
MONOCYTES NFR BLD AUTO: 15 % (ref 4–12)
NEUTROPHILS # BLD AUTO: 5.71 THOUSANDS/ÂΜL (ref 1.85–7.62)
NEUTS SEG NFR BLD AUTO: 72 % (ref 43–75)
NITRITE UR QL STRIP: NEGATIVE
NON-SQ EPI CELLS URNS QL MICRO: ABNORMAL /HPF
NRBC BLD AUTO-RTO: 0 /100 WBCS
NT-PROBNP SERPL-MCNC: 505 PG/ML
O2 CT BLDV-SCNC: 13.1 ML/DL
PCO2 BLDV: 34.9 MM HG (ref 42–50)
PH BLDV: 7.42 [PH] (ref 7.3–7.4)
PH UR STRIP.AUTO: 6 [PH]
PLATELET # BLD AUTO: 126 THOUSANDS/UL (ref 149–390)
PMV BLD AUTO: 11 FL (ref 8.9–12.7)
PO2 BLDV: 73.3 MM HG (ref 35–45)
POTASSIUM SERPL-SCNC: 4.5 MMOL/L (ref 3.5–5.3)
PROT SERPL-MCNC: 6.7 G/DL (ref 6.4–8.4)
PROT UR STRIP-MCNC: ABNORMAL MG/DL
RBC # BLD AUTO: 3.57 MILLION/UL (ref 3.81–5.12)
RBC #/AREA URNS AUTO: ABNORMAL /HPF
SARS-COV-2 RNA RESP QL NAA+PROBE: NEGATIVE
SODIUM SERPL-SCNC: 141 MMOL/L (ref 135–147)
SP GR UR STRIP.AUTO: 1.02 (ref 1–1.03)
UROBILINOGEN UR STRIP-ACNC: <2 MG/DL
VALPROATE SERPL-MCNC: 59 UG/ML (ref 50–100)
WBC # BLD AUTO: 7.97 THOUSAND/UL (ref 4.31–10.16)
WBC #/AREA URNS AUTO: ABNORMAL /HPF
WBC CLUMPS # UR AUTO: PRESENT /UL

## 2022-10-15 PROCEDURE — G1004 CDSM NDSC: HCPCS

## 2022-10-15 PROCEDURE — 82553 CREATINE MB FRACTION: CPT

## 2022-10-15 PROCEDURE — 82550 ASSAY OF CK (CPK): CPT

## 2022-10-15 PROCEDURE — 71250 CT THORAX DX C-: CPT

## 2022-10-15 PROCEDURE — U0005 INFEC AGEN DETEC AMPLI PROBE: HCPCS | Performed by: INTERNAL MEDICINE

## 2022-10-15 PROCEDURE — 99285 EMERGENCY DEPT VISIT HI MDM: CPT | Performed by: EMERGENCY MEDICINE

## 2022-10-15 PROCEDURE — 87186 SC STD MICRODIL/AGAR DIL: CPT

## 2022-10-15 PROCEDURE — 36415 COLL VENOUS BLD VENIPUNCTURE: CPT

## 2022-10-15 PROCEDURE — U0003 INFECTIOUS AGENT DETECTION BY NUCLEIC ACID (DNA OR RNA); SEVERE ACUTE RESPIRATORY SYNDROME CORONAVIRUS 2 (SARS-COV-2) (CORONAVIRUS DISEASE [COVID-19]), AMPLIFIED PROBE TECHNIQUE, MAKING USE OF HIGH THROUGHPUT TECHNOLOGIES AS DESCRIBED BY CMS-2020-01-R: HCPCS | Performed by: INTERNAL MEDICINE

## 2022-10-15 PROCEDURE — 99285 EMERGENCY DEPT VISIT HI MDM: CPT

## 2022-10-15 PROCEDURE — 81001 URINALYSIS AUTO W/SCOPE: CPT

## 2022-10-15 PROCEDURE — 80053 COMPREHEN METABOLIC PANEL: CPT

## 2022-10-15 PROCEDURE — 93005 ELECTROCARDIOGRAM TRACING: CPT

## 2022-10-15 PROCEDURE — 84484 ASSAY OF TROPONIN QUANT: CPT

## 2022-10-15 PROCEDURE — NC001 PR NO CHARGE: Performed by: INTERNAL MEDICINE

## 2022-10-15 PROCEDURE — 80164 ASSAY DIPROPYLACETIC ACD TOT: CPT

## 2022-10-15 PROCEDURE — 71045 X-RAY EXAM CHEST 1 VIEW: CPT

## 2022-10-15 PROCEDURE — 82805 BLOOD GASES W/O2 SATURATION: CPT

## 2022-10-15 PROCEDURE — 70450 CT HEAD/BRAIN W/O DYE: CPT

## 2022-10-15 PROCEDURE — 83880 ASSAY OF NATRIURETIC PEPTIDE: CPT

## 2022-10-15 PROCEDURE — 87086 URINE CULTURE/COLONY COUNT: CPT

## 2022-10-15 PROCEDURE — 96361 HYDRATE IV INFUSION ADD-ON: CPT

## 2022-10-15 PROCEDURE — 85025 COMPLETE CBC W/AUTO DIFF WBC: CPT

## 2022-10-15 PROCEDURE — 82140 ASSAY OF AMMONIA: CPT

## 2022-10-15 PROCEDURE — 82607 VITAMIN B-12: CPT

## 2022-10-15 PROCEDURE — 87077 CULTURE AEROBIC IDENTIFY: CPT

## 2022-10-15 PROCEDURE — 72125 CT NECK SPINE W/O DYE: CPT

## 2022-10-15 PROCEDURE — 96360 HYDRATION IV INFUSION INIT: CPT

## 2022-10-15 RX ORDER — POTASSIUM CHLORIDE 750 MG/1
10 TABLET, EXTENDED RELEASE ORAL 2 TIMES DAILY
Status: DISCONTINUED | OUTPATIENT
Start: 2022-10-15 | End: 2022-10-19 | Stop reason: HOSPADM

## 2022-10-15 RX ORDER — PROPRANOLOL HYDROCHLORIDE 10 MG/1
10 TABLET ORAL 2 TIMES DAILY
Status: DISCONTINUED | OUTPATIENT
Start: 2022-10-15 | End: 2022-10-19 | Stop reason: HOSPADM

## 2022-10-15 RX ORDER — AMLODIPINE BESYLATE 10 MG/1
10 TABLET ORAL DAILY
Status: DISCONTINUED | OUTPATIENT
Start: 2022-10-16 | End: 2022-10-19 | Stop reason: HOSPADM

## 2022-10-15 RX ORDER — GABAPENTIN 100 MG/1
100 CAPSULE ORAL 3 TIMES DAILY
Status: DISCONTINUED | OUTPATIENT
Start: 2022-10-15 | End: 2022-10-19 | Stop reason: HOSPADM

## 2022-10-15 RX ORDER — ACETAMINOPHEN 325 MG/1
650 TABLET ORAL EVERY 8 HOURS PRN
Status: DISCONTINUED | OUTPATIENT
Start: 2022-10-15 | End: 2022-10-19 | Stop reason: HOSPADM

## 2022-10-15 RX ORDER — SODIUM CHLORIDE, SODIUM GLUCONATE, SODIUM ACETATE, POTASSIUM CHLORIDE, MAGNESIUM CHLORIDE, SODIUM PHOSPHATE, DIBASIC, AND POTASSIUM PHOSPHATE .53; .5; .37; .037; .03; .012; .00082 G/100ML; G/100ML; G/100ML; G/100ML; G/100ML; G/100ML; G/100ML
75 INJECTION, SOLUTION INTRAVENOUS CONTINUOUS
Status: DISCONTINUED | OUTPATIENT
Start: 2022-10-15 | End: 2022-10-16

## 2022-10-15 RX ORDER — ASPIRIN 81 MG/1
81 TABLET ORAL DAILY
Status: DISCONTINUED | OUTPATIENT
Start: 2022-10-16 | End: 2022-10-17

## 2022-10-15 RX ORDER — POLYETHYLENE GLYCOL 3350 17 G/17G
17 POWDER, FOR SOLUTION ORAL 2 TIMES DAILY
Status: DISCONTINUED | OUTPATIENT
Start: 2022-10-15 | End: 2022-10-19 | Stop reason: HOSPADM

## 2022-10-15 RX ORDER — LACTULOSE 20 G/30ML
20 SOLUTION ORAL 2 TIMES DAILY
Status: DISCONTINUED | OUTPATIENT
Start: 2022-10-15 | End: 2022-10-19 | Stop reason: HOSPADM

## 2022-10-15 RX ORDER — LISINOPRIL 10 MG/1
10 TABLET ORAL DAILY
Status: DISCONTINUED | OUTPATIENT
Start: 2022-10-16 | End: 2022-10-15

## 2022-10-15 RX ORDER — PANTOPRAZOLE SODIUM 40 MG/1
40 TABLET, DELAYED RELEASE ORAL
Status: DISCONTINUED | OUTPATIENT
Start: 2022-10-16 | End: 2022-10-19 | Stop reason: HOSPADM

## 2022-10-15 RX ORDER — ATORVASTATIN CALCIUM 40 MG/1
40 TABLET, FILM COATED ORAL DAILY
Status: DISCONTINUED | OUTPATIENT
Start: 2022-10-16 | End: 2022-10-19 | Stop reason: HOSPADM

## 2022-10-15 RX ORDER — SENNOSIDES 8.6 MG
1 TABLET ORAL
Status: DISCONTINUED | OUTPATIENT
Start: 2022-10-15 | End: 2022-10-19 | Stop reason: HOSPADM

## 2022-10-15 RX ORDER — HEPARIN SODIUM 5000 [USP'U]/ML
5000 INJECTION, SOLUTION INTRAVENOUS; SUBCUTANEOUS EVERY 8 HOURS SCHEDULED
Status: DISCONTINUED | OUTPATIENT
Start: 2022-10-15 | End: 2022-10-19 | Stop reason: HOSPADM

## 2022-10-15 RX ORDER — OLANZAPINE 7.5 MG/1
7.5 TABLET ORAL
Status: DISCONTINUED | OUTPATIENT
Start: 2022-10-15 | End: 2022-10-17

## 2022-10-15 RX ORDER — LEVOTHYROXINE SODIUM 0.1 MG/1
100 TABLET ORAL
Status: DISCONTINUED | OUTPATIENT
Start: 2022-10-16 | End: 2022-10-19 | Stop reason: HOSPADM

## 2022-10-15 RX ADMIN — POTASSIUM CHLORIDE 10 MEQ: 750 TABLET, EXTENDED RELEASE ORAL at 21:57

## 2022-10-15 RX ADMIN — OLANZAPINE 7.5 MG: 7.5 TABLET, FILM COATED ORAL at 21:57

## 2022-10-15 RX ADMIN — HEPARIN SODIUM 5000 UNITS: 5000 INJECTION INTRAVENOUS; SUBCUTANEOUS at 21:58

## 2022-10-15 RX ADMIN — PROPRANOLOL HYDROCHLORIDE 10 MG: 10 TABLET ORAL at 21:57

## 2022-10-15 RX ADMIN — CEFTRIAXONE SODIUM 1000 MG: 10 INJECTION, POWDER, FOR SOLUTION INTRAVENOUS at 18:14

## 2022-10-15 RX ADMIN — GABAPENTIN 100 MG: 100 CAPSULE ORAL at 21:57

## 2022-10-15 RX ADMIN — LACTULOSE 20 G: 20 SOLUTION ORAL at 21:58

## 2022-10-15 RX ADMIN — CARBIDOPA AND LEVODOPA 1 TABLET: 25; 100 TABLET ORAL at 21:58

## 2022-10-15 RX ADMIN — SODIUM CHLORIDE, SODIUM GLUCONATE, SODIUM ACETATE, POTASSIUM CHLORIDE, MAGNESIUM CHLORIDE, SODIUM PHOSPHATE, DIBASIC, AND POTASSIUM PHOSPHATE 75 ML/HR: .53; .5; .37; .037; .03; .012; .00082 INJECTION, SOLUTION INTRAVENOUS at 22:03

## 2022-10-15 RX ADMIN — SODIUM CHLORIDE 1000 ML: 0.9 INJECTION, SOLUTION INTRAVENOUS at 13:03

## 2022-10-15 NOTE — ED PROVIDER NOTES
History  Chief Complaint   Patient presents with   • Possible UTI     On Keflex for UTI, increased confusion and lethargy  72F PMH Parkinson's was sent to the ER from a nursing facility for concern of UTI  Patient has been on Keflex for UTI but has had increasing confusion and fatigue  Patient alert and oriented but confused, unable to provide history  Called and spoke with nursing home staff  The states she had a fall 10/12 and since then has had progressive confusion  She has also had low-grade fevers and dysuria and has been treated with Keflex since 10/13, the reports she had 2+ bacteria in the urine but the urine culture did not have growth  They also stated she seemed short of breath this morning and had a blood pressure of 90/50  They states she is normally conversational and oriented  Prior to Admission Medications   Prescriptions Last Dose Informant Patient Reported? Taking? Divalproex Sodium (DEPAKOTE PO)   Yes No   Sig: Take 250 mg by mouth in the morning Daily at 8 am   OLANZapine (ZyPREXA) 7 5 mg tablet   Yes No   Sig: Take 7 5 mg by mouth daily at bedtime   Skin Protectants, Misc   (MINERIN CREME EX)   Yes No   Sig: Apply topically as needed (dry skin) Twice daily prn   ZOLPIDEM TARTRATE PO   Yes No   Sig: Take 10 mg by mouth daily at bedtime   amLODIPine (NORVASC) 10 mg tablet   Yes No   Sig: Take 10 mg by mouth daily   aspirin (ECOTRIN LOW STRENGTH) 81 mg EC tablet   Yes No   Sig: Take 81 mg by mouth daily   atorvastatin (LIPITOR) 40 mg tablet   Yes No   Sig: Take 40 mg by mouth daily nightly   carbidopa-levodopa (PARCOPA)  mg per disintegrating tablet   Yes No   Sig: Take 1 tablet by mouth 2 (two) times a day   divalproex sodium (Depakote ER) 500 mg 24 hr tablet   Yes No   Sig: Take 500 mg by mouth daily at bedtime Daily at bedtime 8 pm   gabapentin (NEURONTIN) 100 mg capsule   Yes No   Sig: Take 100 mg by mouth 3 (three) times a day   hydrOXYzine HCL (ATARAX) 25 mg tablet   Yes No   Sig: Take 25 mg by mouth 2 (two) times a day   levothyroxine 100 mcg tablet   Yes No   Sig: Take 100 mcg by mouth daily   lisinopril (ZESTRIL) 10 mg tablet   Yes No   Sig: Take 10 mg by mouth daily   meclizine (ANTIVERT) 12 5 MG tablet   Yes No   Sig: Take 12 5 mg by mouth in the morning Daily at 8 am   omeprazole (PriLOSEC) 20 mg delayed release capsule   Yes No   Sig: Take 20 mg by mouth daily   oxybutynin (DITROPAN-XL) 5 mg 24 hr tablet   Yes No   Sig: Take 5 mg by mouth daily   polyethylene glycol (MIRALAX) 17 g packet   No No   Sig: Take 17 g by mouth 2 (two) times a day   potassium chloride (Klor-Con) 10 mEq tablet   Yes No   Sig: Take 10 mEq by mouth 2 (two) times a day   propranolol (INDERAL) 10 mg tablet   Yes No   Sig: Take 10 mg by mouth 2 (two) times a day      Facility-Administered Medications: None       Past Medical History:   Diagnosis Date   • Disease of thyroid gland    • GERD (gastroesophageal reflux disease)    • Hypertension    • Hypoglycemia 2/2/2022   • Mood disorder (HCC)    • Neuropathy    • Osteoporosis    • Parkinson disease ([facility] [address] )    • Schizophrenia ([facility] [address] )    • Seizure ([facility] [facility] )    • Urinary incontinence    • Vitamin D deficiency        History reviewed  No pertinent surgical history  Family History   Problem Relation Age of Onset   • No Known Problems Mother    • No Known Problems Father      I have reviewed and agree with the history as documented      E-Cigarette/Vaping   • E-Cigarette Use Never User      E-Cigarette/Vaping Substances     Social History     Tobacco Use   • Smoking status: Never Smoker   • Smokeless tobacco: Never Used   Vaping Use   • Vaping Use: Never used   Substance Use Topics   • Alcohol use: Never   • Drug use: Never        Review of Systems   Unable to perform ROS: Mental status change       Physical Exam  ED Triage Vitals   Temperature Pulse Respirations Blood Pressure SpO2   10/15/22 1224 10/15/22 1224 10/15/22 1224 10/15/22 1229 10/15/22 1224 97 6 °F (36 4 °C) 83 18 104/50 94 %      Temp Source Heart Rate Source Patient Position - Orthostatic VS BP Location FiO2 (%)   10/15/22 1224 10/15/22 1224 10/15/22 1224 10/15/22 1224 --   Oral Monitor Sitting Right arm       Pain Score       10/15/22 1542       No Pain             Orthostatic Vital Signs  Vitals:    10/15/22 2230 10/16/22 0012 10/16/22 0906 10/16/22 1610   BP:  139/66 144/81    Pulse: 79 78 87 91   Patient Position - Orthostatic VS:           Physical Exam  Vitals and nursing note reviewed  Constitutional:       General: She is not in acute distress  Appearance: She is not ill-appearing  HENT:      Head: Normocephalic  Mouth/Throat:      Mouth: Mucous membranes are dry  Eyes:      Extraocular Movements: Extraocular movements intact  Pupils: Pupils are equal, round, and reactive to light  Cardiovascular:      Rate and Rhythm: Normal rate and regular rhythm  Heart sounds: No murmur heard  Pulmonary:      Effort: Pulmonary effort is normal  No respiratory distress  Breath sounds: Normal breath sounds  No wheezing, rhonchi or rales  Abdominal:      General: Abdomen is flat  There is no distension  Palpations: Abdomen is soft  Tenderness: There is no abdominal tenderness  There is no right CVA tenderness, left CVA tenderness, guarding or rebound  Musculoskeletal:      Cervical back: No tenderness  Skin:     General: Skin is warm and dry  Neurological:      Mental Status: She is alert and oriented to person, place, and time  Cranial Nerves: No cranial nerve deficit  Sensory: No sensory deficit  Motor: No weakness        Comments: Alert and oriented x4  Confusion, rambling         ED Medications  Medications   amLODIPine (NORVASC) tablet 10 mg (10 mg Oral Given 10/16/22 0906)   aspirin (ECOTRIN LOW STRENGTH) EC tablet 81 mg (81 mg Oral Given 10/16/22 0906)   atorvastatin (LIPITOR) tablet 40 mg (40 mg Oral Given 10/16/22 0906) carbidopa-levodopa (SINEMET)  mg per tablet 1 tablet (1 tablet Oral Given 10/16/22 1356)   gabapentin (NEURONTIN) capsule 100 mg (100 mg Oral Given 10/16/22 0906)   levothyroxine tablet 100 mcg (100 mcg Oral Given 10/16/22 0640)   OLANZapine (ZyPREXA) tablet 7 5 mg (7 5 mg Oral Given 10/15/22 2157)   pantoprazole (PROTONIX) EC tablet 40 mg (40 mg Oral Given 10/16/22 0640)   polyethylene glycol (MIRALAX) packet 17 g (17 g Oral Given 10/16/22 0905)   potassium chloride (K-DUR,KLOR-CON) CR tablet 10 mEq (10 mEq Oral Given 10/16/22 0908)   propranolol (INDERAL) tablet 10 mg (10 mg Oral Given 10/16/22 0908)   lactulose oral solution 20 g (20 g Oral Given 10/16/22 0906)   heparin (porcine) subcutaneous injection 5,000 Units (5,000 Units Subcutaneous Given 10/16/22 1356)   ceftriaxone (ROCEPHIN) 1 g/50 mL in dextrose IVPB (has no administration in time range)   senna (SENOKOT) tablet 8 6 mg (has no administration in time range)   acetaminophen (TYLENOL) tablet 650 mg (has no administration in time range)   lamoTRIgine (LaMICtal) tablet 25 mg (has no administration in time range)   sodium chloride 0 9 % bolus 1,000 mL (0 mL Intravenous Stopped 10/15/22 1607)   ceftriaxone (ROCEPHIN) 1 g/50 mL in dextrose IVPB (0 mg Intravenous Stopped 10/15/22 1857)       Diagnostic Studies  Results Reviewed     Procedure Component Value Units Date/Time    CBC and differential [[id number]]  (Abnormal) Collected: 10/16/22 0613    Lab Status: Final result Specimen: Blood from Arm, Right Updated: 10/16/22 0839     WBC 9 00 Thousand/uL      RBC 4 27 Million/uL      Hemoglobin 11 9 g/dL      Hematocrit 37 6 %      MCV 88 fL      MCH 27 9 pg      MCHC 31 6 g/dL      RDW 15 7 %      MPV 11 0 fL      Platelets 162 Thousands/uL      nRBC 0 /100 WBCs      Neutrophils Relative 68 %      Immat GRANS % 2 %      Lymphocytes Relative 11 %      Monocytes Relative 19 %      Eosinophils Relative 0 %      Basophils Relative 0 %      Neutrophils Absolute 6 12 Thousands/µL      Immature Grans Absolute 0 15 Thousand/uL      Lymphocytes Absolute 0 97 Thousands/µL      Monocytes Absolute 1 69 Thousand/µL      Eosinophils Absolute 0 03 Thousand/µL      Basophils Absolute 0 04 Thousands/µL     Basic metabolic panel [[id number]]  (Abnormal) Collected: 10/16/22 0613    Lab Status: Final result Specimen: Blood from Arm, Right Updated: 10/16/22 0725     Sodium 142 mmol/L      Potassium 4 7 mmol/L      Chloride 112 mmol/L      CO2 24 mmol/L      ANION GAP 6 mmol/L      BUN 40 mg/dL      Creatinine 0 94 mg/dL      Glucose 129 mg/dL      Calcium 9 4 mg/dL      eGFR 59 ml/min/1 73sq m     Narrative:      [place] guidelines for Chronic Kidney Disease (CKD):   •  Stage 1 with normal or high GFR (GFR > 90 mL/min/1 73 square meters)  •  Stage 2 Mild CKD (GFR = 60-89 mL/min/1 73 square meters)  •  Stage 3A Moderate CKD (GFR = 45-59 mL/min/1 73 square meters)  •  Stage 3B Moderate CKD (GFR = 30-44 mL/min/1 73 square meters)  •  Stage 4 Severe CKD (GFR = 15-29 mL/min/1 73 square meters)  •  Stage 5 End Stage CKD (GFR <15 mL/min/1 73 square meters)  Note: GFR calculation is accurate only with a steady state creatinine    Folate [[id number]]  (Abnormal) Collected: 10/16/22 0613    Lab Status: Final result Specimen: Blood from Arm, Right Updated: 10/16/22 0725     Folate >20 0 ng/mL     Magnesium [[id number]]  (Normal) Collected: 10/16/22 0613    Lab Status: Final result Specimen: Blood from Arm, Right Updated: 10/16/22 0725     Magnesium 2 4 mg/dL     Phosphorus [[id number]]  (Normal) Collected: 10/16/22 0613    Lab Status: Final result Specimen: Blood from Arm, Right Updated: 10/16/22 0725     Phosphorus 2 7 mg/dL     TSH, 3rd generation [[id number]]  (Normal) Collected: 10/16/22 0613    Lab Status: Final result Specimen: Blood from Arm, Right Updated: 10/16/22 0718     TSH 3RD GENERATON 3 690 uIU/mL     Narrative:      Patients undergoing fluorescein dye angiography may retain small amounts of fluorescein in the body for 48-72 hours post procedure  Samples containing fluorescein can produce falsely depressed TSH values  If the patient had this procedure,a specimen should be resubmitted post fluorescein clearance  Vitamin B12 [[id number]]  (Normal) Collected: 10/15/22 1301    Lab Status: Final result Specimen: Blood from Arm, Right Updated: 10/16/22 0441     Vitamin B-12 810 pg/mL     CK (with reflex to MB) [[id number]]  (Normal) Resulted: 10/15/22 2236    Lab Status: Final result Specimen: Blood Updated: 10/15/22 2236     Total  U/L     COVID only [[id number]]  (Normal) Collected: 10/15/22 2055    Lab Status: Final result Specimen: Nares from Nose Updated: 10/15/22 2140     SARS-CoV-2 Negative    Narrative:      FOR PEDIATRIC PATIENTS - copy/paste COVID Guidelines URL to browser: [email or web address]  [email or web address]    SARS-CoV-2 assay is a Nucleic Acid Amplification assay intended for the  qualitative detection of nucleic acid from SARS-CoV-2 in nasopharyngeal  swabs  Results are for the presumptive identification of SARS-CoV-2 RNA  Positive results are indicative of infection with SARS-CoV-2, the virus  causing COVID-19, but do not rule out bacterial infection or co-infection  with other viruses  Laboratories within the [place] and its  territories are required to report all positive results to the appropriate  public health authorities  Negative results do not preclude SARS-CoV-2  infection and should not be used as the sole basis for treatment or other  patient management decisions  Negative results must be combined with  clinical observations, patient history, and epidemiological information  This test has not been FDA cleared or approved  This test has been authorized by FDA under an Emergency Use Authorization  (EUA)   This test is only authorized for the duration of time the  declaration that circumstances exist justifying the authorization of the  emergency use of an in vitro diagnostic tests for detection of SARS-CoV-2  virus and/or diagnosis of COVID-19 infection under section 564(b)(1) of  the Act, 21 U  S C  444XLG-1(G)(5), unless the authorization is terminated  or revoked sooner  The test has been validated but independent review by FDA  and CLIA is pending  Test performed using [employer] GeneXpert: This RT-PCR assay targets N2,  a region unique to SARS-CoV-2  A conserved region in the E-gene was chosen  for pan-Sarbecovirus detection which includes SARS-CoV-2  According to CMS-2020-01-R, this platform meets the definition of high-throughput technology  NT-BNP PRO [[id number]]  (Abnormal) Collected: 10/15/22 1301    Lab Status: Final result Specimen: Blood from Arm, Right Updated: 10/15/22 2101     NT-proBNP 505 pg/mL     Urine Microscopic [[id number]]  (Abnormal) Collected: 10/15/22 1538    Lab Status: Final result Specimen: Urine, Straight Cath Updated: 10/15/22 1620     RBC, UA 10-20 /hpf      WBC, UA Innumerable /hpf      Epithelial Cells None Seen /hpf      Bacteria, UA None Seen /hpf      Hyaline Casts, UA 0-3 /lpf      WBC Clumps Present    Urine culture [[id number]] Collected: 10/15/22 1538    Lab Status:  In process Specimen: Urine, Straight Cath Updated: 10/15/22 1620    HS Troponin I 2hr [[id number]]  (Normal) Collected: 10/15/22 1542    Lab Status: Final result Specimen: Blood from Arm, Right Updated: 10/15/22 1619     hs TnI 2hr 8 ng/L      Delta 2hr hsTnI -1 ng/L     UA w Reflex to Microscopic w Reflex to Culture [[id number]]  (Abnormal) Collected: 10/15/22 1538    Lab Status: Final result Specimen: Urine, Straight Cath Updated: 10/15/22 1610     Color, UA Yellow     Clarity, UA Turbid     Specific Gravity, UA 1 022     pH, UA 6 0     Leukocytes, UA Large     Nitrite, UA Negative     Protein, UA 70 (1+) mg/dl      Glucose, UA Negative mg/dl      Ketones, UA Negative mg/dl Urobilinogen, UA <2 0 mg/dl      Bilirubin, UA Negative     Occult Blood, UA Moderate    Blood gas, venous [[id number]]  (Abnormal) Collected: 10/15/22 1333    Lab Status: Final result Specimen: Blood from Arm, Left Updated: 10/15/22 1353     pH, Rocael 7 418     pCO2, Rocael 34 9 mm Hg      pO2, Rocael 73 3 mm Hg      HCO3, Rocael 22 0 mmol/L      Base Excess, Rocael -2 0 mmol/L      O2 Content, Rocael 13 1 ml/dL      O2 HGB, VENOUS 93 6 %     CBC and differential [[id number]]  (Abnormal) Collected: 10/15/22 1301    Lab Status: Final result Specimen: Blood from Arm, Right Updated: 10/15/22 1353     WBC 7 97 Thousand/uL      RBC 3 57 Million/uL      Hemoglobin 10 0 g/dL      Hematocrit 31 6 %      MCV 89 fL      MCH 28 0 pg      MCHC 31 6 g/dL      RDW 15 8 %      MPV 11 0 fL      Platelets 501 Thousands/uL      nRBC 0 /100 WBCs      Neutrophils Relative 72 %      Immat GRANS % 1 %      Lymphocytes Relative 12 %      Monocytes Relative 15 %      Eosinophils Relative 0 %      Basophils Relative 0 %      Neutrophils Absolute 5 71 Thousands/µL      Immature Grans Absolute 0 07 Thousand/uL      Lymphocytes Absolute 0 96 Thousands/µL      Monocytes Absolute 1 20 Thousand/µL      Eosinophils Absolute 0 01 Thousand/µL      Basophils Absolute 0 02 Thousands/µL     HS Troponin 0hr (reflex protocol) [[id number]]  (Normal) Collected: 10/15/22 1301    Lab Status: Final result Specimen: Blood from Arm, Right Updated: 10/15/22 1348     hs TnI 0hr 9 ng/L     Comprehensive metabolic panel [[id number]]  (Abnormal) Collected: 10/15/22 1301    Lab Status: Final result Specimen: Blood from Arm, Right Updated: 10/15/22 1332     Sodium 141 mmol/L      Potassium 4 5 mmol/L      Chloride 112 mmol/L      CO2 23 mmol/L      ANION GAP 6 mmol/L      BUN 52 mg/dL      Creatinine 1 24 mg/dL      Glucose 202 mg/dL      Calcium 8 9 mg/dL      Corrected Calcium 10 5 mg/dL      AST 61 U/L      ALT 58 U/L      Alkaline Phosphatase 73 U/L      Total Protein 6 7 g/dL Albumin 2 0 g/dL      Total Bilirubin 0 44 mg/dL      eGFR 42 ml/min/1 73sq m     Narrative:      [place] guidelines for Chronic Kidney Disease (CKD):   •  Stage 1 with normal or high GFR (GFR > 90 mL/min/1 73 square meters)  •  Stage 2 Mild CKD (GFR = 60-89 mL/min/1 73 square meters)  •  Stage 3A Moderate CKD (GFR = 45-59 mL/min/1 73 square meters)  •  Stage 3B Moderate CKD (GFR = 30-44 mL/min/1 73 square meters)  •  Stage 4 Severe CKD (GFR = 15-29 mL/min/1 73 square meters)  •  Stage 5 End Stage CKD (GFR <15 mL/min/1 73 square meters)  Note: GFR calculation is accurate only with a steady state creatinine    Ammonia [[id number]]  (Abnormal) Collected: 10/15/22 1301    Lab Status: Final result Specimen: Blood from Arm, Right Updated: 10/15/22 1327     Ammonia 60 umol/L     Valproic acid level, total [[id number]]  (Normal) Collected: 10/15/22 1301    Lab Status: Final result Specimen: Blood from Arm, Right Updated: 10/15/22 1327     Valproic Acid, Total 59 ug/mL                  CT chest without contrast   Final Result by Brice Girard MD (10/15 2935)      Markedly compromised by respiratory motion with probable mild interstitial edema  Trace effusions with mild bibasilar atelectasis  Workstation performed: [id number]         XR chest 1 view portable   Final Result by Brice Girard MD (38/31 1671)      Low lung volumes with vascular crowding with mild bibasilar atelectasis and pulmonary venous congestion  Workstation performed: [id number]         CT cervical spine without contrast   Final Result by Saulo Clark MD (10/15 1406)      No cervical spine fracture or traumatic malalignment  Severe degenerative disc disease from C3 to C7                   Workstation performed: [id number]         CT head without contrast   Final Result by Saulo Clark MD (10/15 1402)      No acute intracranial abnormality  Workstation performed: [id number]               Procedures  Procedures      ED Course               Identification of Seniors at [facility] Most Recent Value   (ISAR) Identification of Seniors at Risk    Before the illness or injury that brought you to the Emergency, did you need someone to help you on a regular basis? 1 Filed at: 10/15/2022 1227   In the last 24 hours, have you needed more help than usual? 1 Filed at: 10/15/2022 1227   Have you been hospitalized for one or more nights during the past 6 months? 0 Filed at: 10/15/2022 1227   In general, do you see well? 0 Filed at: 10/15/2022 1227   In general, do you have serious problems with your memory? 0 Filed at: 10/15/2022 1227   Do you take more than three different medications every day? 1 Filed at: 10/15/2022 1227   ISAR Score 3 Filed at: 10/15/2022 1227                    SBIRT 22yo+    Flowsheet Row Most Recent Value   SBIRT (23 yo +)    In order to provide better care to our patients, we are screening all of our patients for alcohol and drug use  Would it be okay to ask you these screening questions? No Filed at: 10/15/2022 1228                MDM  Number of Diagnoses or Management Options  Acute encephalopathy  Diagnosis management comments: 72F PMH Parkinson's was sent to the ER from a nursing facility for concern of UTI  Workup including vital signs, physical exam, labs, urinalysis, CT head, CT chest   Urinalysis with signs of infection, altered mental status likely secondary to UTI, treatment including ceftriaxone  Found have elevated ammonia level on valproic acid, possibly contributing to altered mental status  Plan for admission to medicine        Disposition  Final diagnoses:   Acute encephalopathy     Time reflects when diagnosis was documented in both MDM as applicable and the Disposition within this note     Time User Action Codes Description Comment    10/15/2022  7:46 PM Moy Marie Add [U87 76] Acute encephalopathy 10/16/2022  1:12 PM Lyndon Hem Add [G40 909] Seizure disorder ([facility] [address] )     10/16/2022  1:12 PM Lyndon Hem Modify [G93 40] Acute encephalopathy       ED Disposition     ED Disposition   Admit    Condition   Stable    Date/Time   Sat Oct 15, 2022  5:02 PM    Comment   Case was discussed with internal medicine and the patient's admission status was agreed to be Admission Status: observation status to the service of Dr Drake Pack               Follow-up Information    None         Current Discharge Medication List      CONTINUE these medications which have NOT CHANGED    Details   amLODIPine (NORVASC) 10 mg tablet Take 10 mg by mouth daily      aspirin (ECOTRIN LOW STRENGTH) 81 mg EC tablet Take 81 mg by mouth daily      atorvastatin (LIPITOR) 40 mg tablet Take 40 mg by mouth daily nightly      carbidopa-levodopa (PARCOPA)  mg per disintegrating tablet Take 1 tablet by mouth 2 (two) times a day      divalproex sodium (Depakote ER) 500 mg 24 hr tablet Take 500 mg by mouth daily at bedtime Daily at bedtime 8 pm      Divalproex Sodium (DEPAKOTE PO) Take 250 mg by mouth in the morning Daily at 8 am      gabapentin (NEURONTIN) 100 mg capsule Take 100 mg by mouth 3 (three) times a day      hydrOXYzine HCL (ATARAX) 25 mg tablet Take 25 mg by mouth 2 (two) times a day      levothyroxine 100 mcg tablet Take 100 mcg by mouth daily      lisinopril (ZESTRIL) 10 mg tablet Take 10 mg by mouth daily      meclizine (ANTIVERT) 12 5 MG tablet Take 12 5 mg by mouth in the morning Daily at 8 am      OLANZapine (ZyPREXA) 7 5 mg tablet Take 7 5 mg by mouth daily at bedtime      omeprazole (PriLOSEC) 20 mg delayed release capsule Take 20 mg by mouth daily      oxybutynin (DITROPAN-XL) 5 mg 24 hr tablet Take 5 mg by mouth daily      polyethylene glycol (MIRALAX) 17 g packet Take 17 g by mouth 2 (two) times a day  Refills: 0    Associated Diagnoses: Abnormal CT scan, colon      potassium chloride (Klor-Con) 10 mEq tablet Take 10 mEq by mouth 2 (two) times a day      propranolol (INDERAL) 10 mg tablet Take 10 mg by mouth 2 (two) times a day      Skin Protectants, Misc  (MINERIN CREME EX) Apply topically as needed (dry skin) Twice daily prn      ZOLPIDEM TARTRATE PO Take 10 mg by mouth daily at bedtime           No discharge procedures on file  PDMP Review     None           ED Provider  Attending physically available and evaluated Manuel Bateman  I managed the patient along with the ED Attending      Electronically Signed by         Scarlett Cabot, MD  10/16/22 2929

## 2022-10-15 NOTE — ED ATTENDING ATTESTATION
10/15/2022  IBonnie MD, saw and evaluated the patient  I have discussed the patient with the resident/non-physician practitioner and agree with the resident's/non-physician practitioner's findings, Plan of Care, and MDM as documented in the resident's/non-physician practitioner's note, except where noted  All available labs and Radiology studies were reviewed  I was present for key portions of any procedure(s) performed by the resident/non-physician practitioner and I was immediately available to provide assistance  At this point I agree with the current assessment done in the Emergency Department  I have conducted an independent evaluation of this patient a history and physical is as follows:    80-year-old woman with history including Parkinson's and recent UTI diagnosis, currently on antibiotics, sent from her nursing facility due to concern for increasing confusion  Patient does not have any complaints however is not a good historian  Unclear baseline  She is awake, alert and conversant but is clearly confused with tangential speech and not able to provide relevant answers to my questions  She does not appear in acute distress  Heart regular rate rhythm, no murmurs rubs or gallops  Lungs clear to auscultation bilaterally  Abdomen soft, nontender, nondistended  No focal neuro deficit  Will get labs, imaging, and continue to monitor in the emergency department  Will admit if necessary      ED Course         Critical Care Time  Procedures

## 2022-10-16 ENCOUNTER — APPOINTMENT (OUTPATIENT)
Dept: NON INVASIVE DIAGNOSTICS | Facility: HOSPITAL | Age: 77
DRG: 441 | End: 2022-10-16
Payer: COMMERCIAL

## 2022-10-16 LAB
ANION GAP SERPL CALCULATED.3IONS-SCNC: 6 MMOL/L (ref 4–13)
ATRIAL RATE: 77 BPM
BASOPHILS # BLD AUTO: 0.04 THOUSANDS/ÂΜL (ref 0–0.1)
BASOPHILS NFR BLD AUTO: 0 % (ref 0–1)
BUN SERPL-MCNC: 40 MG/DL (ref 5–25)
CALCIUM SERPL-MCNC: 9.4 MG/DL (ref 8.3–10.1)
CHLORIDE SERPL-SCNC: 112 MMOL/L (ref 96–108)
CO2 SERPL-SCNC: 24 MMOL/L (ref 21–32)
CREAT SERPL-MCNC: 0.94 MG/DL (ref 0.6–1.3)
EOSINOPHIL # BLD AUTO: 0.03 THOUSAND/ÂΜL (ref 0–0.61)
EOSINOPHIL NFR BLD AUTO: 0 % (ref 0–6)
ERYTHROCYTE [DISTWIDTH] IN BLOOD BY AUTOMATED COUNT: 15.7 % (ref 11.6–15.1)
FOLATE SERPL-MCNC: >20 NG/ML (ref 3.1–17.5)
GFR SERPL CREATININE-BSD FRML MDRD: 59 ML/MIN/1.73SQ M
GLUCOSE SERPL-MCNC: 129 MG/DL (ref 65–140)
HCT VFR BLD AUTO: 37.6 % (ref 34.8–46.1)
HGB BLD-MCNC: 11.9 G/DL (ref 11.5–15.4)
IMM GRANULOCYTES # BLD AUTO: 0.15 THOUSAND/UL (ref 0–0.2)
IMM GRANULOCYTES NFR BLD AUTO: 2 % (ref 0–2)
LYMPHOCYTES # BLD AUTO: 0.97 THOUSANDS/ÂΜL (ref 0.6–4.47)
LYMPHOCYTES NFR BLD AUTO: 11 % (ref 14–44)
MAGNESIUM SERPL-MCNC: 2.4 MG/DL (ref 1.6–2.6)
MCH RBC QN AUTO: 27.9 PG (ref 26.8–34.3)
MCHC RBC AUTO-ENTMCNC: 31.6 G/DL (ref 31.4–37.4)
MCV RBC AUTO: 88 FL (ref 82–98)
MONOCYTES # BLD AUTO: 1.69 THOUSAND/ÂΜL (ref 0.17–1.22)
MONOCYTES NFR BLD AUTO: 19 % (ref 4–12)
NEUTROPHILS # BLD AUTO: 6.12 THOUSANDS/ÂΜL (ref 1.85–7.62)
NEUTS SEG NFR BLD AUTO: 68 % (ref 43–75)
NRBC BLD AUTO-RTO: 0 /100 WBCS
P AXIS: 52 DEGREES
PHOSPHATE SERPL-MCNC: 2.7 MG/DL (ref 2.3–4.1)
PLATELET # BLD AUTO: 105 THOUSANDS/UL (ref 149–390)
PMV BLD AUTO: 11 FL (ref 8.9–12.7)
POTASSIUM SERPL-SCNC: 4.7 MMOL/L (ref 3.5–5.3)
PR INTERVAL: 132 MS
QRS AXIS: -10 DEGREES
QRSD INTERVAL: 76 MS
QT INTERVAL: 356 MS
QTC INTERVAL: 402 MS
RBC # BLD AUTO: 4.27 MILLION/UL (ref 3.81–5.12)
SODIUM SERPL-SCNC: 142 MMOL/L (ref 135–147)
T WAVE AXIS: 7 DEGREES
TSH SERPL DL<=0.05 MIU/L-ACNC: 3.69 UIU/ML (ref 0.45–4.5)
VENTRICULAR RATE: 77 BPM
VIT B12 SERPL-MCNC: 810 PG/ML (ref 100–900)
WBC # BLD AUTO: 9 THOUSAND/UL (ref 4.31–10.16)

## 2022-10-16 PROCEDURE — 84100 ASSAY OF PHOSPHORUS: CPT

## 2022-10-16 PROCEDURE — 99223 1ST HOSP IP/OBS HIGH 75: CPT | Performed by: INTERNAL MEDICINE

## 2022-10-16 PROCEDURE — 93010 ELECTROCARDIOGRAM REPORT: CPT | Performed by: INTERNAL MEDICINE

## 2022-10-16 PROCEDURE — NC001 PR NO CHARGE: Performed by: INTERNAL MEDICINE

## 2022-10-16 PROCEDURE — 84443 ASSAY THYROID STIM HORMONE: CPT

## 2022-10-16 PROCEDURE — 83735 ASSAY OF MAGNESIUM: CPT

## 2022-10-16 PROCEDURE — 82746 ASSAY OF FOLIC ACID SERUM: CPT

## 2022-10-16 PROCEDURE — 80048 BASIC METABOLIC PNL TOTAL CA: CPT

## 2022-10-16 PROCEDURE — 85025 COMPLETE CBC W/AUTO DIFF WBC: CPT

## 2022-10-16 RX ORDER — LAMOTRIGINE 25 MG/1
25 TABLET ORAL WEEKLY
Status: DISCONTINUED | OUTPATIENT
Start: 2022-10-16 | End: 2022-10-16

## 2022-10-16 RX ORDER — LAMOTRIGINE 25 MG/1
25 TABLET ORAL DAILY
Status: DISCONTINUED | OUTPATIENT
Start: 2022-10-17 | End: 2022-10-17

## 2022-10-16 RX ADMIN — HEPARIN SODIUM 5000 UNITS: 5000 INJECTION INTRAVENOUS; SUBCUTANEOUS at 13:56

## 2022-10-16 RX ADMIN — POLYETHYLENE GLYCOL 3350 17 G: 17 POWDER, FOR SOLUTION ORAL at 09:05

## 2022-10-16 RX ADMIN — ASPIRIN 81 MG: 81 TABLET, COATED ORAL at 09:06

## 2022-10-16 RX ADMIN — AMLODIPINE BESYLATE 10 MG: 10 TABLET ORAL at 09:06

## 2022-10-16 RX ADMIN — LEVOTHYROXINE SODIUM 100 MCG: 100 TABLET ORAL at 06:40

## 2022-10-16 RX ADMIN — GABAPENTIN 100 MG: 100 CAPSULE ORAL at 22:05

## 2022-10-16 RX ADMIN — GABAPENTIN 100 MG: 100 CAPSULE ORAL at 17:00

## 2022-10-16 RX ADMIN — LACTULOSE 20 G: 20 SOLUTION ORAL at 09:06

## 2022-10-16 RX ADMIN — OLANZAPINE 7.5 MG: 7.5 TABLET, FILM COATED ORAL at 22:04

## 2022-10-16 RX ADMIN — CEFTRIAXONE SODIUM 1000 MG: 10 INJECTION, POWDER, FOR SOLUTION INTRAVENOUS at 17:26

## 2022-10-16 RX ADMIN — CARBIDOPA AND LEVODOPA 1 TABLET: 25; 100 TABLET ORAL at 13:56

## 2022-10-16 RX ADMIN — GABAPENTIN 100 MG: 100 CAPSULE ORAL at 09:06

## 2022-10-16 RX ADMIN — LACTULOSE 20 G: 20 SOLUTION ORAL at 17:09

## 2022-10-16 RX ADMIN — ATORVASTATIN CALCIUM 40 MG: 40 TABLET, FILM COATED ORAL at 09:06

## 2022-10-16 RX ADMIN — PROPRANOLOL HYDROCHLORIDE 10 MG: 10 TABLET ORAL at 09:08

## 2022-10-16 RX ADMIN — CARBIDOPA AND LEVODOPA 1 TABLET: 25; 100 TABLET ORAL at 22:05

## 2022-10-16 RX ADMIN — PROPRANOLOL HYDROCHLORIDE 10 MG: 10 TABLET ORAL at 17:09

## 2022-10-16 RX ADMIN — POTASSIUM CHLORIDE 10 MEQ: 750 TABLET, EXTENDED RELEASE ORAL at 17:09

## 2022-10-16 RX ADMIN — HEPARIN SODIUM 5000 UNITS: 5000 INJECTION INTRAVENOUS; SUBCUTANEOUS at 22:05

## 2022-10-16 RX ADMIN — HEPARIN SODIUM 5000 UNITS: 5000 INJECTION INTRAVENOUS; SUBCUTANEOUS at 06:40

## 2022-10-16 RX ADMIN — POTASSIUM CHLORIDE 10 MEQ: 750 TABLET, EXTENDED RELEASE ORAL at 09:08

## 2022-10-16 RX ADMIN — PANTOPRAZOLE SODIUM 40 MG: 40 TABLET, DELAYED RELEASE ORAL at 06:40

## 2022-10-16 NOTE — PLAN OF CARE
Problem: Potential for Falls  Goal: Patient will remain free of falls  Description: INTERVENTIONS:  - Educate patient/family on patient safety including physical limitations  - Instruct patient to call for assistance with activity   - Consult OT/PT to assist with strengthening/mobility   - Keep Call bell within reach  - Keep bed low and locked with side rails adjusted as appropriate  - Keep care items and personal belongings within reach  - Initiate and maintain comfort rounds  - Make Fall Risk Sign visible to staff  - Offer Toileting every 2 Hours, in advance of need  - Initiate/Maintain no alarm  - Obtain necessary fall risk management equipment:   - Apply yellow socks and bracelet for high fall risk patients  - Consider moving patient to room near nurses station  Outcome: Progressing

## 2022-10-16 NOTE — ASSESSMENT & PLAN NOTE
-at home on propranolol 10 mg b i d , lisinopril 10 mg daily, amlodipine 10 mg daily  - lisinopril initially held in setting of CONCHITA => CONCHITA has resolved consider adding back  - continue home propranolol and amlodipine

## 2022-10-16 NOTE — PROGRESS NOTES
INTERNAL MEDICINE RESIDENCY PROGRESS NOTE     Name: Andrew Pardo   Age & Sex: 69 y o  female   MRN: [id number]  Unit/Bed#: CW2 218-01   Encounter: [id number]  Team: SOD Team C     PATIENT INFORMATION     Name: Andrew Pardo   Age & Sex: 69 y o  female   MRN: [id number]  Hospital Stay Days: 0    ASSESSMENT/PLAN     Principal Problem:    Acute encephalopathy  Active Problems:    Other schizophrenia ([facility] )    Parkinson's disease ([facility] )    Primary hypertension    Acquired hypothyroidism    Seizure disorder ([facility] )    Suspected UTI    Respiratory failure, acute ([facility] )    CONCHITA (acute kidney injury) ([facility] )    Hyperammonemia ([place] [address] )      * Acute encephalopathy  Assessment & Plan  -Patient with history of Parkinson's, seizure disorder on Depakote, hypothyroidism and schizophrenia presenting with acute encephalopathy  Per patient's assisted care facility, patient has had a change in her mental status  Also had a fall on Wednesday without any head strike or loss of consciousness secondary to reported leg pain  She was being treated for suspected UTI with Keflex  Her POA, patient has had decreased mental status since prior admission for COVID-19 and ileus in a 2/2022  In the ED, patient was found to be afebrile and hemodynamically stable  She had increased oxygen requirement, requiring 2 L of O2  VBG revealed respiratory alkalosis  She was found to have an CONCHITA with creatinine 1 24 (baseline 0 6 - 0 7)  Ammonia elevated to 60  AST 61 and ALT 58  UA showed innumerable wbc's without any evidence of bacteria or nitrites  Chest x-ray and CT chest showed some mild pulmonary edema without any evidence of discrete infiltrates suggestive of pneumonia    Patient's presentation could be secondary to UTI versus hepatic encephalopathy versus worsening of hypothyroidism versus neurocognitive decline  - will obtain TSH, creatinine kinase, magnesium, phosphate, B12, folate  - hold Depakote in setting of potential hepatic encephalopathy  - will start lactulose 20 g b i d   - pending urine culture  - continue with IV ceftriaxone 1 g Q 24  - will obtain bladder scan to assess for urinary retention  - continue with home carbidopa levodopa and Zyprexa  - consider geriatrics consult given advanced age, comorbidities, and recent change in mental status  - made level 1 code status based on conversation with facility  However, given that POA was expressed desire for level 3 should reassess in a m  Hyperammonemia ([facility] )  Assessment & Plan  -patient presenting with acute encephalopathy  This found to have ammonia 60, AST 61, ALT 58  Was on Depakote for seizures  Cannot rule out possibility of hepatic encephalopathy  - start lactulose 20 g b i d  => titrate to 2-3 bowel movements  - hold Depakote      CONCHITA (acute kidney injury) ([facility] [address] )  Assessment & Plan  -on admission, creatinine 1 24 with baseline 0 6-0 7  Of note, her GFR has decreased to 42 from 85 in 02/2022  Her CONCHITA is likely early secondary to prerenal causes  Suspect she had poor p o  Intake secondary to her UTI  Cannot rule out possibility of urinary retention as cause for CONCHITA  Appears volume down on exam   - will obtain bladder scan  - will treat with gentle IV fluids: Isolyte at 75 cc/hour  - will hold lisinopril 10 mg  - avoid hypotension and nephrotoxic agents  - Cr improved to 0 94 on 10/16    Respiratory failure, acute ([facility] )  Assessment & Plan  -On presentation, patient was placed on 2 L O2 and was satting at around 95-96%  Per chart review, patient was on supplemental oxygen during last admission in February 2022 and was weaned off by discharge  Chest x-ray and chest CT concerning for pulmonary edema  No noted previous echocardiograms on file  - obtain BNP and echo  - will hold off an diuresis for now, as patient appears clinically volume down  - continue to wean O2 as tolerated  - Patient placed off oxygen on AM of 10/16 and ambulated to bathroom without distress       Suspected UTI  Assessment & Plan  -Patient was having dysuria, could be renal and altered mental status at facility  PTA she had a UA concerning for and pyuria  Was also reported to have low-grade temp of 100 1°  Was started on Keflex of which she received 3 days  Her urine culture from facility grew skin say  UA in the ED showed innumerable wbc's without any evidence of nitrites or bacteria  Was given 1 dose Rocephin in ED  UTI could potentially be contributing to patient's current presentation of acute encephalopathy  - urine culture pending  - will continue with IV ceftriaxone 1 g Q 24    Seizure disorder [facility])  Assessment & Plan  -patient is on Depakote 250 mg a m  And Depakote 500 mg at bedtime  However, patient is presenting with acute encephalopathy and was find to have hyperammonemia with ammonia 60  Have some concern for hepatic encephalopathy  - Depakote level: 59  - will hold Depakote at this time    Acquired hypothyroidism  Assessment & Plan  -patient presenting with acute encephalopathy  Has a history of acquired hypothyroidism and is on levothyroxine 100 mcg at home  Most recent TSH on 0 1/31 was 5 14  - TSH at admission was normal at 3 690  - continue home levothyroxine 100 mcg    Primary hypertension  Assessment & Plan  -at home on propranolol 10 mg b i d , lisinopril 10 mg daily, amlodipine 10 mg daily  - will hold lisinopril in setting of CONCHITA  - continue home propranolol and amlodipine    Parkinson's disease ([facility] [address] )  Assessment & Plan  -continue home carbidopa levodopa  mg b i d  Other schizophrenia ([facility] [address] )  Assessment & Plan  -reported to be on Zyprexa 7 5 mg at home  - will continue on admission      Disposition: Continue workup for altered mental status  Treating UTI vs Hepatic encephalopathy  SUBJECTIVE     Patient seen and examined  No acute events overnight  Upon my encounter patient was lying comfortably in hospital bed    Patient's only complaint at this time is that she needs to use the bathroom  Patient states that she feels like she needs to have a bowel movement, which would be her 1st after initiating lactulose treatment  Presently denies any fever, chills, nausea, vomiting, diarrhea, constipation, chest pain, shortness a breath  Otherwise denies any new or worsening complaints  Patient was alert and oriented to person and time upon my examination but not place  OBJECTIVE     Vitals:    10/15/22 2230 10/16/22 0004 10/16/22 0012 10/16/22 0600   BP:   139/66    BP Location:   Right arm    Pulse: 79  78    Resp:       Temp: 99 1 °F (37 3 °C)      TempSrc:       SpO2: 91% 91% 91%    Weight:    101 kg (222 lb)      Temperature:   Temp (24hrs), Av 4 °F (36 9 °C), Min:97 6 °F (36 4 °C), Max:99 1 °F (37 3 °C)    Temperature: 99 1 °F (37 3 °C)  Intake & Output:  I/O       10/14 0701  10/15 0700 10/15 0701  10/16 0700    I V   597 5    IV Piggyback  1000    Total Intake  1597 5    Net  +1597 5              Weights:        Body mass index is 35 83 kg/m²  Weight (last 2 days)     Date/Time Weight    10/16/22 0600 101 (222)        Physical Exam  Constitutional:       General: She is not in acute distress  Appearance: Normal appearance  She is obese  She is not ill-appearing  Cardiovascular:      Rate and Rhythm: Normal rate and regular rhythm  Pulses: Normal pulses  Heart sounds: Normal heart sounds  Pulmonary:      Effort: Pulmonary effort is normal  No respiratory distress  Breath sounds: Normal breath sounds  No wheezing, rhonchi or rales  Abdominal:      General: Abdomen is flat  Bowel sounds are normal  There is no distension  Palpations: Abdomen is soft  Tenderness: There is no abdominal tenderness  Musculoskeletal:      Right lower leg: No edema  Left lower leg: No edema  Neurological:      Mental Status: She is alert  She is disoriented  Motor: No weakness        Comments: Patient alert and oriented to person and time, but not to place        LABORATORY DATA     Labs: I have personally reviewed pertinent reports  Results from last 7 days   Lab Units 10/16/22  0613 10/15/22  1301   WBC Thousand/uL 9 00 7 97   HEMOGLOBIN g/dL 11 9 10 0*   HEMATOCRIT % 37 6 31 6*   PLATELETS Thousands/uL 105* 126*   NEUTROS PCT % 68 72   MONOS PCT % 19* 15*      Results from last 7 days   Lab Units 10/16/22  0613 10/15/22  1301   POTASSIUM mmol/L 4 7 4 5   CHLORIDE mmol/L 112* 112*   CO2 mmol/L 24 23   BUN mg/dL 40* 52*   CREATININE mg/dL 0 94 1 24   CALCIUM mg/dL 9 4 8 9   ALK PHOS U/L  --  73   ALT U/L  --  58   AST U/L  --  61*     Results from last 7 days   Lab Units 10/16/22  0613   MAGNESIUM mg/dL 2 4     Results from last 7 days   Lab Units 10/16/22  0613   PHOSPHORUS mg/dL 2 7                    IMAGING & DIAGNOSTIC TESTING     Radiology Results: I have personally reviewed pertinent reports  CT head without contrast    Result Date: 10/15/2022  Impression: No acute intracranial abnormality  Workstation performed: [id number]     CT chest without contrast    Result Date: 10/15/2022  Impression: Markedly compromised by respiratory motion with probable mild interstitial edema  Trace effusions with mild bibasilar atelectasis  Workstation performed: [id number]     CT cervical spine without contrast    Result Date: 10/15/2022  Impression: No cervical spine fracture or traumatic malalignment  Severe degenerative disc disease from C3 to C7  Workstation performed: [id number]     Other Diagnostic Testing: I have personally reviewed pertinent reports      ACTIVE MEDICATIONS     Current Facility-Administered Medications   Medication Dose Route Frequency   • acetaminophen (TYLENOL) tablet 650 mg  650 mg Oral Q8H PRN   • amLODIPine (NORVASC) tablet 10 mg  10 mg Oral Daily   • aspirin (ECOTRIN LOW STRENGTH) EC tablet 81 mg  81 mg Oral Daily   • atorvastatin (LIPITOR) tablet 40 mg  40 mg Oral Daily   • carbidopa-levodopa (SINEMET)  mg per tablet 1 tablet  1 tablet Oral BID   • ceftriaxone (ROCEPHIN) 1 g/50 mL in dextrose IVPB  1,000 mg Intravenous Q24H   • gabapentin (NEURONTIN) capsule 100 mg  100 mg Oral TID   • heparin (porcine) subcutaneous injection 5,000 Units  5,000 Units Subcutaneous Q8H [address]   • lactulose oral solution 20 g  20 g Oral BID   • levothyroxine tablet 100 mcg  100 mcg Oral Early Morning   • multi-electrolyte (ISOLYTE-S PH 7 4 equivalent) IV solution  75 mL/hr Intravenous Continuous   • OLANZapine (ZyPREXA) tablet 7 5 mg  7 5 mg Oral HS   • pantoprazole (PROTONIX) EC tablet 40 mg  40 mg Oral Early Morning   • polyethylene glycol (MIRALAX) packet 17 g  17 g Oral BID   • potassium chloride (K-DUR,KLOR-CON) CR tablet 10 mEq  10 mEq Oral BID   • propranolol (INDERAL) tablet 10 mg  10 mg Oral BID   • senna (SENOKOT) tablet 8 6 mg  1 tablet Oral HS PRN       VTE Pharmacologic Prophylaxis: Heparin  VTE Mechanical Prophylaxis: sequential compression device    Portions of the record may have been created with voice recognition software  Occasional wrong word or "sound a like" substitutions may have occurred due to the inherent limitations of voice recognition software    Read the chart carefully and recognize, using context, where substitutions have occurred   ==  [facility]  Internal Medicine Residency PGY-2

## 2022-10-16 NOTE — ASSESSMENT & PLAN NOTE
-patient is on Depakote 250 mg a m  And Depakote 500 mg at bedtime  However, patient is presenting with acute encephalopathy and was find to have hyperammonemia with ammonia 60  Have some concern for hepatic encephalopathy  - Depakote level: 59  - Depakote held on admission over concern for hepatic encephalopathy  - neurology was consulted and believed that patient did not have any seizure history per chart review and conversation was POA  Depakote was initiated for mood stabilization     -neurology recommended starting Lamictal 25 mg daily => however over concerns of acute encephalopathy and potential adverse effect of SJS did not initiate Lamictal  - pharmacy recommended optimizing Zyprexa rather than adding back Lamictal or Depakote for mood stabilization

## 2022-10-16 NOTE — ASSESSMENT & PLAN NOTE
-patient presenting with acute encephalopathy  This found to have ammonia 60, AST 61, ALT 58  Was on Depakote for seizures  Cannot rule out possibility of hepatic encephalopathy  -Appears to be back to baseline mental status, per conversation with POA and Facility  - continue lactulose 20 g b i d   And titrate to 2-3 bowel movements  - follow-up repeat ammonia level

## 2022-10-16 NOTE — H&P
INTERNAL MEDICINE RESIDENCY ADMISSION H&P     Name: Aimee Chapman   Age & Sex: 69 y o  female   MRN: [id number]  Unit/Bed#: JL   Encounter: [id number]  Primary Care Provider: Basil Wang MD    Code Status: Level 1 - Full Code  Admission Status: INPATIENT   Disposition: Patient requires Med/Surg    Admit to team: SOD Team C     ASSESSMENT/PLAN     Principal Problem:    Acute encephalopathy  Active Problems:    Other schizophrenia ([facility] )    Parkinson's disease ([place] [address] )    Primary hypertension    Acquired hypothyroidism    Seizure disorder ([facility] )    Suspected UTI    Respiratory failure, acute ([facility] )    CONCHITA (acute kidney injury) ([facility] )    Hyperammonemia ([place] [address] )      * Acute encephalopathy  Assessment & Plan  -Patient with history of Parkinson's, seizure disorder on Depakote, hypothyroidism and schizophrenia presenting with acute encephalopathy  Per patient's assisted care facility, patient has had a change in her mental status  Also had a fall on Wednesday without any head strike or loss of consciousness secondary to reported leg pain  She was being treated for suspected UTI with Keflex  Her POA, patient has had decreased mental status since prior admission for COVID-19 and ileus in a 2/2022  In the ED, patient was found to be afebrile and hemodynamically stable  She had increased oxygen requirement, requiring 2 L of O2  VBG revealed respiratory alkalosis  She was found to have an CONCHITA with creatinine 1 24 (baseline 0 6 - 0 7)  Ammonia elevated to 60  AST 61 and ALT 58  UA showed innumerable wbc's without any evidence of bacteria or nitrites  Chest x-ray and CT chest showed some mild pulmonary edema without any evidence of discrete infiltrates suggestive of pneumonia    Patient's presentation could be secondary to UTI versus hepatic encephalopathy versus worsening of hypothyroidism versus neurocognitive decline  - will obtain TSH, creatinine kinase, magnesium, phosphate, B12, folate  - hold Depakote in setting of potential hepatic encephalopathy  - will start lactulose 20 g b i d   - pending urine culture  - continue with IV ceftriaxone 1 g Q 24  - will obtain bladder scan to assess for urinary retention  - continue with home carbidopa levodopa and Zyprexa  - consider geriatrics consult given advanced age, comorbidities, and recent change in mental status  -Made level 1 code status due to conversation with facility, however given POA was expressing desire for level 3, would consider reassessing in AM    Hyperammonemia [facility])  Assessment & Plan  -patient presenting with acute encephalopathy  This found to have ammonia 60, AST 61, ALT 58  Was on Depakote for seizures  Cannot rule out possibility of hepatic encephalopathy  - start lactulose 20 g b i d  => titrate to 2-3 bowel movements  - hold Depakote      CONCHITA (acute kidney injury) ([facility] )  Assessment & Plan  -on admission, creatinine 1 24 with baseline 0 6-0 7  Of note, her GFR has decreased to 42 from 85 in 02/2022  Her CONCHITA is likely early secondary to prerenal causes  Suspect she had poor p o  Intake secondary to her UTI  Cannot rule out possibility of urinary retention as cause for CONCHITA  Appears volume down on exam   - will obtain bladder scan  - will treat with gentle IV fluids: Isolyte at 75 cc/hour  - will hold lisinopril 10 mg  - avoid hypotension and nephrotoxic agents    Respiratory failure, acute ([place] [address] )  Assessment & Plan  -On presentation, patient was placed on 2 L O2 and was satting at around 95-96%  Per chart review, patient was on supplemental oxygen during last admission in February 2022 and was weaned off by discharge  Chest x-ray and chest CT concerning for pulmonary edema  No noted previous echocardiograms on file    - obtain BNP and echo  - will hold off an diuresis for now, as patient appears clinically volume down  - continue to wean O2 as tolerated    Suspected UTI  Assessment & Plan  -Patient was having dysuria, could be renal and altered mental status at facility  PTA she had a UA concerning for and pyuria  Was also reported to have low-grade temp of 100 1°  Was started on Keflex of which she received 3 days  Her urine culture from facility grew skin say  UA in the ED showed innumerable wbc's without any evidence of nitrites or bacteria  Was given 1 dose Rocephin in ED  UTI could potentially be contributing to patient's current presentation of acute encephalopathy  - urine culture pending  - will continue with IV ceftriaxone 1 g Q 24    Seizure disorder [facility])  Assessment & Plan  -patient is on Depakote 250 mg a m  And Depakote 500 mg at bedtime  However, patient is presenting with acute encephalopathy and was find to have hyperammonemia with ammonia 60  Have some concern for hepatic encephalopathy  - Depakote level: 59  - will hold Depakote at this time    Acquired hypothyroidism  Assessment & Plan  -patient presenting with acute encephalopathy  Has a history of acquired hypothyroidism and is on levothyroxine 100 mcg at home  Most recent TSH on 0 1/31 was 5 14  - will obtain TSH  - continue home levothyroxine 100 mcg    Primary hypertension  Assessment & Plan  -at home on propranolol 10 mg b i d , lisinopril 10 mg daily, amlodipine 10 mg daily  - will hold lisinopril in setting of CONCHITA  - continue home propranolol and amlodipine    Parkinson's disease ([facility] [facility] )  Assessment & Plan  -continue home carbidopa levodopa  mg b i d  Other schizophrenia ([facility] )  Assessment & Plan  -reported to be on Zyprexa 7 5 mg at home  - will continue on admission      VTE Pharmacologic Prophylaxis:  Heparin  VTE Mechanical Prophylaxis: SCD    CHIEF COMPLAINT     Chief Complaint   Patient presents with   • Possible UTI     On Keflex for UTI, increased confusion and lethargy          HISTORY OF PRESENT ILLNESS      Speedy Krueger is a 32-year-old female with past medical history significant for Parkinson's (on carbidopa levodopa), seizure disorder (on Depakote), hypertension, hypothyroidism, and schizophrenia who presents with acute encephalopathy  Patient is a very poor historian  She tends to go off on tangents after being asked questions  She did report that she was brought in to ED because her assisted care staff were concerned about her  She did report that she had some dysuria earlier this week  He denied any fevers, chills, chest pain, shortness of breath, lightheadedness, dizziness, abdominal pain, nausea, vomiting, diarrhea, or constipation  She comes from [facility] facility  Per staff at [facility], patient has been having change in mental status over this past week  They were unable to explain what exactly this change was for them  However, they did state that normally she is oriented to person place and time but is loquacious  She had a fall on 10/12 secondary to pain in her leg  There was no reported head strike or loss of consciousness  She also had lab work which was concerning for “dehydration” and a UA which showed 2+ bacteria  Over concerns for UTI she was started on Keflex on 10/13  She was also noted to have low-grade fever to 100 1 earlier this week  Also spoke to listed contact Hortencia Santoyo, who reported that she was the patient's POA  She states that patient has had changes in her mental status since her previous admission in February 2022 for COVID during which she was found to have ileus  The patient at baseline is reported to have some level of intellectual disability, however over these last few months it seems that she has not been herself  Otherwise, the POA states that she has not had any recent complaints of any symptoms  POA also reported that patient was DNR and DNI  However, facility reported that she was most recently level 1 code  Patient was most recently hospitalized from 01/31 to 02/08/2022 at [facility]  Was initially on 2 L O2 nasal cannula but was weaned off by discharge    She was also found to have a CT abdomen pelvis significant for severe distention of colon with gas and stool with transition at proximal sigmoid  Flex sig revealed no evidence of mass or obvious stricture  GI recommended MiraLax b i d  Even setting of loose stool  In the ED, patient was afebrile and hemodynamically stable  She was satting at around 93% on 2 L O2  Her lab work was significant for creatinine 1 24, AST 61, ALT 58, and ammonia 60  UA was significant for innumerable WBC without any evidence of bacteria  CT chest and chest x-ray were suggestive of mild pulmonary edema  She received 1 L normal saline and ceftriaxone x1  REVIEW OF SYSTEMS     Review of Systems   Constitutional: Negative for chills and fever  HENT: Negative for rhinorrhea and sore throat  Eyes: Negative for visual disturbance  Respiratory: Negative for cough and shortness of breath  Cardiovascular: Positive for leg swelling  Negative for chest pain and palpitations  Gastrointestinal: Negative for abdominal pain, constipation, diarrhea, nausea and vomiting  Genitourinary: Positive for dysuria  Negative for hematuria  Musculoskeletal: Negative for back pain  Neurological: Negative for dizziness and light-headedness  Psychiatric/Behavioral: Negative for agitation  OBJECTIVE     Vitals:    10/15/22 1645 10/15/22 1745 10/15/22 2115 10/15/22 2157   BP: 115/84 135/82 129/64 131/70   Pulse: 72 72 76    Resp: 16 18 20    Temp:       TempSrc:       SpO2: 93% 94% 96%       Temperature:   Temp (24hrs), Av 6 °F (36 4 °C), Min:97 6 °F (36 4 °C), Max:97 6 °F (36 4 °C)    Temperature: 97 6 °F (36 4 °C)  Intake & Output:  I/O       10/14 0701  10/15 0700 10/15 0701  10/16 07    IV Piggyback  1000    Total Intake  1000    Net  +1000              Weights: There is no height or weight on file to calculate BMI    Weight (last 2 days)     None        Physical Exam  Constitutional:       General: She is not in acute distress  Appearance: She is obese  HENT:      Head: Normocephalic and atraumatic  Mouth/Throat:      Mouth: Mucous membranes are dry  Pharynx: Oropharynx is clear  Eyes:      Conjunctiva/sclera: Conjunctivae normal    Cardiovascular:      Rate and Rhythm: Normal rate and regular rhythm  Heart sounds: No murmur heard  No friction rub  No gallop  Pulmonary:      Effort: Pulmonary effort is normal  No respiratory distress  Breath sounds: No wheezing or rales  Comments: No appreciable rales in anterior lung fields  Abdominal:      General: There is distension  Palpations: Abdomen is soft  Tenderness: There is no abdominal tenderness  There is no guarding  Comments: Abdomen appeared distended   Musculoskeletal:      Right lower leg: Edema present  Left lower leg: Edema present  Comments: Had 1+ pitting edema bilaterally   Skin:     General: Skin is warm and dry  Neurological:      Mental Status: She is alert  Comments: Oriented to person time and situation, however not oriented to place (believe she was at [address] [place] [address]); tangential and difficult to redirect in conversation       PAST MEDICAL HISTORY     Past Medical History:   Diagnosis Date   • Disease of thyroid gland    • GERD (gastroesophageal reflux disease)    • Hypertension    • Hypoglycemia 2/2/2022   • Mood disorder ([facility] )    • Neuropathy    • Osteoporosis    • Parkinson disease ([facility] )    • Schizophrenia ([facility] )    • [facility]    • Urinary incontinence    • Vitamin D deficiency      PAST SURGICAL HISTORY   History reviewed  No pertinent surgical history    SOCIAL & FAMILY HISTORY     Social History     Substance and Sexual Activity   Alcohol Use Never     Substance and Sexual Activity   Alcohol Use Never        Substance and Sexual Activity   Drug Use Never     Social History     Tobacco Use   Smoking Status Never Smoker   Smokeless Tobacco Never Used Family History   Problem Relation Age of Onset   • No Known Problems Mother    • No Known Problems Father      LABORATORY DATA     Labs: I have personally reviewed pertinent reports  Results from last 7 days   Lab Units 10/15/22  1301   WBC Thousand/uL 7 97   HEMOGLOBIN g/dL 10 0*   HEMATOCRIT % 31 6*   PLATELETS Thousands/uL 126*   NEUTROS PCT % 72   MONOS PCT % 15*      Results from last 7 days   Lab Units 10/15/22  1301   POTASSIUM mmol/L 4 5   CHLORIDE mmol/L 112*   CO2 mmol/L 23   BUN mg/dL 52*   CREATININE mg/dL 1 24   CALCIUM mg/dL 8 9   ALK PHOS U/L 73   ALT U/L 58   AST U/L 61*                          Micro:  No results found for: BLOODCX, URINECX, WOUNDCULT, SPUTUMCULTUR  IMAGING & DIAGNOSTIC TESTS     Imaging: I have personally reviewed pertinent reports  CT head without contrast    Result Date: 10/15/2022  Impression: No acute intracranial abnormality  Workstation performed: [id number]     CT chest without contrast    Result Date: 10/15/2022  Impression: Markedly compromised by respiratory motion with probable mild interstitial edema  Trace effusions with mild bibasilar atelectasis  Workstation performed: [id number]     CT cervical spine without contrast    Result Date: 10/15/2022  Impression: No cervical spine fracture or traumatic malalignment  Severe degenerative disc disease from C3 to C7  Workstation performed: [id number]     EKG, Pathology, and Other Studies: I have personally reviewed pertinent reports  ALLERGIES     Allergies   Allergen Reactions   • Pineapple - Food Allergy Other (See Comments)     Unknown reaction     MEDICATIONS PRIOR TO ARRIVAL     Prior to Admission medications    Medication Sig Start Date End Date Taking?  Authorizing Provider   amLODIPine (NORVASC) 10 mg tablet Take 10 mg by mouth daily    Historical Provider, MD   aspirin (ECOTRIN LOW STRENGTH) 81 mg EC tablet Take 81 mg by mouth daily    Historical Provider, MD   atorvastatin (LIPITOR) 40 mg tablet Take 40 mg by mouth daily nightly    Historical Provider, MD   carbidopa-levodopa (PARCOPA)  mg per disintegrating tablet Take 1 tablet by mouth 2 (two) times a day    Historical Provider, MD   divalproex sodium (Depakote ER) 500 mg 24 hr tablet Take 500 mg by mouth daily at bedtime Daily at bedtime 8 pm    Historical Provider, MD   Divalproex Sodium (DEPAKOTE PO) Take 250 mg by mouth in the morning Daily at 8 am    Historical Provider, MD   gabapentin (NEURONTIN) 100 mg capsule Take 100 mg by mouth 3 (three) times a day    Historical Provider, MD   hydrOXYzine HCL (ATARAX) 25 mg tablet Take 25 mg by mouth 2 (two) times a day    Historical Provider, MD   levothyroxine 100 mcg tablet Take 100 mcg by mouth daily    Historical Provider, MD   lisinopril (ZESTRIL) 10 mg tablet Take 10 mg by mouth daily    Historical Provider, MD   meclizine (ANTIVERT) 12 5 MG tablet Take 12 5 mg by mouth in the morning Daily at 8 am    Historical Provider, MD   OLANZapine (ZyPREXA) 7 5 mg tablet Take 7 5 mg by mouth daily at bedtime    Historical Provider, MD   omeprazole (PriLOSEC) 20 mg delayed release capsule Take 20 mg by mouth daily    Historical Provider, MD   oxybutynin (DITROPAN-XL) 5 mg 24 hr tablet Take 5 mg by mouth daily    Historical Provider, MD   polyethylene glycol (MIRALAX) 17 g packet Take 17 g by mouth 2 (two) times a day 2/8/22   Daniel Mera PA-C   potassium chloride (Klor-Con) 10 mEq tablet Take 10 mEq by mouth 2 (two) times a day    Historical Provider, MD   propranolol (INDERAL) 10 mg tablet Take 10 mg by mouth 2 (two) times a day    Historical Provider, MD   Skin Protectants, Misc   (Maryjane Grayson EX) Apply topically as needed (dry skin) Twice daily prn    Historical Provider, MD   ZOLPIDEM TARTRATE PO Take 10 mg by mouth daily at bedtime    Historical Provider, MD     MEDICATIONS ADMINISTERED IN LAST 24 HOURS     Medication Administration - last 24 hours from 10/14/2022 2213 to 10/15/2022 2213 Date/Time Order Dose Route Action Action by     10/15/2022 1607 sodium chloride 0 9 % bolus 1,000 mL 0 mL Intravenous Stopped Martace ELIAN Pedroza     10/15/2022 1303 sodium chloride 0 9 % bolus 1,000 mL 1,000 mL Intravenous [address] Mary Pedroza RN     10/15/2022 1857 ceftriaxone (ROCEPHIN) 1 g/50 mL in dextrose IVPB 0 mg Intravenous Stopped Mary Pedroza RN     10/15/2022 1814 ceftriaxone (ROCEPHIN) 1 g/50 mL in dextrose IVPB 1,000 mg Intravenous New [address] Mary Pedroza RN     10/15/2022 2158 carbidopa-levodopa (SINEMET)  mg per tablet 1 tablet 1 tablet Oral Given Rueben Sever, RN     10/15/2022 2157 gabapentin (NEURONTIN) capsule 100 mg 100 mg Oral Given Rueben Sever, RN     10/15/2022 2157 OLANZapine (ZyPREXA) tablet 7 5 mg 7 5 mg Oral Given Rueben Sever, RN     10/15/2022 2157 potassium chloride (K-DUR,KLOR-CON) CR tablet 10 mEq 10 mEq Oral Given Rueben Sever, RN     10/15/2022 2157 propranolol (INDERAL) tablet 10 mg 10 mg Oral Given Rueben Sever, RN     10/15/2022 2158 lactulose oral solution 20 g 20 g Oral Given Rueben Sever, RN     10/15/2022 2158 heparin (porcine) subcutaneous injection 5,000 Units 5,000 Units Subcutaneous Given Rueben Sever, RN     10/15/2022 2203 multi-electrolyte (PLASMALYTE-A/ISOLYTE-S PH 7 4) IV solution 75 mL/hr Intravenous New Bag Rueben Sever, RN        CURRENT MEDICATIONS     Current Facility-Administered Medications   Medication Dose Route Frequency Provider Last Rate   • [START ON 10/16/2022] amLODIPine  10 mg Oral Daily Mic Pedroza MD     • [START ON 10/16/2022] aspirin  81 mg Oral Daily Mic Pedroza MD     • [START ON 10/16/2022] atorvastatin  40 mg Oral Daily Mic Pedroza MD     • carbidopa-levodopa  1 tablet Oral BID Mic Pedroza MD     • [START ON 10/16/2022] cefTRIAXone  1,000 mg Intravenous Q24H Mic Pedroza MD     • gabapentin  100 mg Oral TID Mic Pderoza MD     • heparin (porcine)  5,000 Units Subcutaneous Q8H Sonam Cruz MD     • lactulose  20 g Oral BID Lei Day MD     • [START ON 10/16/2022] levothyroxine  100 mcg Oral Daily Lei Day MD     • multi-electrolyte  75 mL/hr Intravenous Continuous Lei Day MD 75 mL/hr (10/15/22 2203)   • OLANZapine  7 5 mg Oral HS Lei Day MD     • [START ON 10/16/2022] pantoprazole  40 mg Oral Early Morning Lei Day MD     • polyethylene glycol  17 g Oral BID Lei Day MD     • potassium chloride  10 mEq Oral BID Lei Day MD     • propranolol  10 mg Oral BID Lei Day MD       multi-electrolyte, 75 mL/hr, Last Rate: 75 mL/hr (10/15/22 2203)           Admission Time  I spent 45 minutes admitting the patient  This involved direct patient contact where I performed a full history and physical, reviewing previous records, and reviewing laboratory and other diagnostic studies  Portions of the record may have been created with voice recognition software  Occasional wrong word or "sound a like" substitutions may have occurred due to the inherent limitations of voice recognition software    Read the chart carefully and recognize, using context, where substitutions have occurred     ==  [facility]  Internal Medicine Residency PGY-1

## 2022-10-16 NOTE — CONSULTS
INTERPROFESSIONAL (PHONE) Kasey Mcqueen 69 y o  female MRN: [id number]  Encounter Date: 10/16/22      Reason for Consult / Principal Problem: seizure medication recommendation    Consulting Provider: Reyna Agarwal DO    Requesting Provider: Christopher Chopra DO      ASSESSMENT:  Ms Elana Duran is a 60-year-old female with history of mood disorder, on Depakote,  No known seizure history per caregiver whom Internal Medicine resident spoke with today-for last 6-7 years at least that she has been on Depakote, admitted here with altered mentation -encephalopathy secondary to UTI and hyperammonemia  Neurology asked for recommendations on another AED  RECOMMENDATIONS:    At this time on chart review as well as per caregiver history and her Care everywhere review in [place] there is no documentation that patient has had seizures or a documented history of epilepsy  There have been no EEGs  Notes from November 2021 in [place] state that patient is on Depakote for mood disorder  Thus okay from my standpoint for this to be discontinued as she does not appear to have a seizure history  Another alternative with lesser risk of transaminitis and hyperammonemia, for mood is Lamictal however this needs to be titrated upward  This needs to be discussed with Psychiatry  Relayed this to primary team     Total time spent in review of data, discussion with requesting provider and rendering advice was 11-20 minutes, >50 of the total time devoted to medical consultative verbal/EMR discussion between providers  Written report will be generated in the EMR

## 2022-10-16 NOTE — ASSESSMENT & PLAN NOTE
-On presentation, patient was placed on 2 L O2 and was satting at around 95-96%  Per chart review, patient was on supplemental oxygen during last admission in February 2022 and was weaned off by discharge  Chest x-ray and chest CT concerning for pulmonary edema  No noted previous echocardiograms on file    -   - Echo showed some grade 1 diastolic dysfunction without any evidence of systolic dysfunction  - chest x-ray showed improvement in pulmonary edema bilaterally  -weaned off oxygen to room air

## 2022-10-16 NOTE — ASSESSMENT & PLAN NOTE
-on admission, creatinine 1 24 with baseline 0 6-0 8  Of note, her GFR has decreased to 42 from 85 in 02/2022  Her CONCHITA is likely early secondary to prerenal causes  Suspect she had poor p o  Intake secondary to her UTI  Cannot rule out possibility of urinary retention as cause for CONCHITA    Appears volume down on exam   - Creatinine 1 24 on presentation => Given gentle IVF => Improved to 0 96 today  - Lisinopril initially held => Given CONCHITA has resolved => Consider adding back lisinopril  - avoid hypotension and nephrotoxic agents

## 2022-10-16 NOTE — PROGRESS NOTES
INTERNAL MEDICINE RESIDENCY SENIOR ADMISSION NOTE     Name: Kayla Caal   Age & Sex: 69 y o  female   MRN: [id number]  Unit/Bed#: CW2 218-01   Encounter: [id number]  Primary Care Provider: Nestor Llamas MD    Admit to team: SOD Team C     Patient seen and examined  Reviewed H&P per Dr Ren Jiang  Agree with the assessment and plan with any exception/addition as noted below:    Principal Problem:    Acute encephalopathy  Active Problems:    Other schizophrenia ([place] [address] )    Parkinson's disease ([place] [address] )    Primary hypertension    Acquired hypothyroidism    Seizure disorder ([place] [address] )    Suspected UTI    Respiratory failure, acute ([place] [address] )    CONCHITA (acute kidney injury) ([place] [address] )    Hyperammonemia ([place] [address] )    #  Metabolic encephalopathy, acute  Ddx UTI vs  Hepatic encephalopathy (though no EtOH hx) vs  Vitamin deficiency (B1, B12) vs  Hypothyroidism  vs  Polypharmacy     Will obtain Hepatitis panel, TSH, B1, B12,   Start lactulose BID to decrease ammonia levels, though other features such as asterixis absent  Consider geriatrics if abx, fluids, cultures, and other workup negative to assess for component of dementia vs  Polypharmacy  UCx obtained for possible UTI  Continue IV ceftriaxone 1g qD    #  Respiratory failure  EMS noted patient to have O2 sat 92%, improved when placed on 2L N/C  Likely has component of OHS +/- RICARDO given body habitus  CT also notable for mild cardiomegaly  Without prior cardiac hx, +CXR  findings of pulm congestion, will obtain echo and proBNP  O2 > 88%  Incentive spirometry for atelectasis noted on imaging  Cardiac, low salt diet  Consider fluid restriction depending on echo findings    #  CONCHITA  Prerenal azotemia, possibly decreased po intake before/after UTI  Patient also on oxybutinin at home -possible retention causing UTI    Hold lisinopril for HTN    Hold oxybutynin  Bladder scan  IV fluids    # Seizure disorder  Will hold depakote for now until mentation improves w/presumed improved hyperammonemia - may resume when improved mentation    #  Hypothyroidism  Continue 100 mcg levothyroxine, obtain TSH    #  Schizophrenia  Continue zyprexa home dose 7 5 mg  Consider holding to rule out polypharmacy/confounders, and restarting PRN for agitation, hallucinations, etc     #  Parkinson Disease  Continue home carbidopa-levodopa  BID    HPI    Patient is a 68 yo F pmhx Parkinson Disease, schizophrenia, hypothyroidism, CAD, HTN, HLD, GERD, seziure disorder presenting with altered mental status from nursing home for past 4 days  Patient is very poor historian due and extremely tangential  However, per chart review including EMS report, it appears patient has been on outpatient keflex course for presumed UTI in the past week  Nursing home UCx showed skin contaminants but unclear at which point cultures were collected (before vs after initiation of abx)  Patient had slightly raised temp but <100 4 but nursing home gave tylenol and called EMS picked up patient  Patient denied dysuria, burning w/urination for me but not for Dr Lobito Dietzem  She was AO to person and place for EMS but only AO to person and time for me  Rest of ROS unobtainable  CT head negative for acute changes  In ED, notable finding was ammonia level of 60  ED suspected valproate toxicity, held valproate and obtained level  Level was within normal range             Code Status: Level 1 - Full Code obtained from nursing facility  Admission Status: OBSERVATION  Disposition: Patient requires Med/Surg  Expected Length of Stay: <2 [address]   PGY-2 Internal Medicine  [facility]

## 2022-10-16 NOTE — ASSESSMENT & PLAN NOTE
-patient presenting with acute encephalopathy  Has a history of acquired hypothyroidism and is on levothyroxine 100 mcg at home    Most recent TSH on 0 1/31 was 5 14  - TSH at admission was normal at 3 690  - continue home levothyroxine 100 mcg

## 2022-10-16 NOTE — ASSESSMENT & PLAN NOTE
-Patient with history of Parkinson's, seizure disorder on Depakote, hypothyroidism and schizophrenia presenting with acute encephalopathy  Per patient's assisted care facility, patient has had a change in her mental status  Also had a fall on Wednesday without any head strike or loss of consciousness secondary to reported leg pain  She was being treated for suspected UTI with Keflex  Her POA, patient has had decreased mental status since prior admission for COVID-19 and ileus in a 2/2022  In the ED, patient was found to be afebrile and hemodynamically stable  She had increased oxygen requirement, requiring 2 L of O2  VBG revealed respiratory alkalosis  She was found to have an CONCHITA with creatinine 1 24 (baseline 0 6 - 0 7)  Ammonia elevated to 60  AST 61 and ALT 58  UA showed innumerable wbc's without any evidence of bacteria or nitrites  Chest x-ray and CT chest showed some mild pulmonary edema without any evidence of discrete infiltrates suggestive of pneumonia  Patient's presentation could be secondary to UTI versus hepatic encephalopathy versus worsening of hypothyroidism versus neurocognitive decline  - TSH, creatinine kinase, magnesium, phosphate, B12, folate WNL  -Depakote switched to Lamictal 20 mg => Lamictal discontinued   - yesterday, abdomen  Distended  CT abdomen pelvis revealed stool burden without any evidence of acute diverticulitis  - will try milk of magnesia Q 1 hours p r n  Until bowel movement => likely can go home if has bowel  - on discharge will provide Senokot scheduled, lactulose 20 g b i d  And MiraLax b i d   -continue with lactulose 20 g b i d   - urine culture less than 10,000 CFU E coli  -finished course of ceftriaxone  - continue with home carbidopa levodopa and Zyprexa  - consider geriatrics consult given advanced age, comorbidities, and recent change in mental status  - made level 1 code status based on conversation with facility    However, given that POA was expressed desire for level 3 should reassess in a m

## 2022-10-16 NOTE — ASSESSMENT & PLAN NOTE
-Patient was having dysuria, could be renal and altered mental status at facility  PTA she had a UA concerning for and pyuria  Was also reported to have low-grade temp of 100 1°  Was started on Keflex of which she received 3 days  Her urine culture from facility grew skin say  UA in the ED showed innumerable wbc's without any evidence of nitrites or bacteria  Was given 1 dose Rocephin in ED   UTI could potentially be contributing to patient's current presentation of acute encephalopathy  - urine culture grew less than 10,000 CFU E coli  - finish 3 day course of ceftriaxone

## 2022-10-17 ENCOUNTER — APPOINTMENT (INPATIENT)
Dept: RADIOLOGY | Facility: HOSPITAL | Age: 77
DRG: 441 | End: 2022-10-17
Payer: COMMERCIAL

## 2022-10-17 ENCOUNTER — APPOINTMENT (INPATIENT)
Dept: NON INVASIVE DIAGNOSTICS | Facility: HOSPITAL | Age: 77
DRG: 441 | End: 2022-10-17
Payer: COMMERCIAL

## 2022-10-17 LAB
ACANTHOCYTES BLD QL SMEAR: PRESENT
ANION GAP SERPL CALCULATED.3IONS-SCNC: 3 MMOL/L (ref 4–13)
ANISOCYTOSIS BLD QL SMEAR: PRESENT
AORTIC ROOT: 3.2 CM
AORTIC VALVE MEAN VELOCITY: 10.5 M/S
APICAL FOUR CHAMBER EJECTION FRACTION: 61 %
ASCENDING AORTA: 3.5 CM
AV LVOT MEAN GRADIENT: 2 MMHG
AV LVOT PEAK GRADIENT: 3 MMHG
AV MEAN GRADIENT: 5 MMHG
AV PEAK GRADIENT: 10 MMHG
AV VELOCITY RATIO: 0.57
BASOPHILS # BLD MANUAL: 0.11 THOUSAND/UL (ref 0–0.1)
BASOPHILS NFR MAR MANUAL: 1 % (ref 0–1)
BILIRUB UR QL STRIP: NEGATIVE
BUN SERPL-MCNC: 36 MG/DL (ref 5–25)
CALCIUM SERPL-MCNC: 9.4 MG/DL (ref 8.3–10.1)
CHLORIDE SERPL-SCNC: 110 MMOL/L (ref 96–108)
CLARITY UR: CLEAR
CO2 SERPL-SCNC: 27 MMOL/L (ref 21–32)
COLOR UR: YELLOW
CREAT SERPL-MCNC: 0.96 MG/DL (ref 0.6–1.3)
DOP CALC AO PEAK VEL: 1.58 M/S
DOP CALC AO VTI: 28.84 CM
DOP CALC LVOT PEAK VEL VTI: 26.28 CM
DOP CALC LVOT PEAK VEL: 0.9 M/S
E WAVE DECELERATION TIME: 187 MS
EOSINOPHIL # BLD MANUAL: 0.11 THOUSAND/UL (ref 0–0.4)
EOSINOPHIL NFR BLD MANUAL: 1 % (ref 0–6)
ERYTHROCYTE [DISTWIDTH] IN BLOOD BY AUTOMATED COUNT: 15.8 % (ref 11.6–15.1)
FRACTIONAL SHORTENING: 40 % (ref 28–44)
GFR SERPL CREATININE-BSD FRML MDRD: 57 ML/MIN/1.73SQ M
GLUCOSE SERPL-MCNC: 131 MG/DL (ref 65–140)
GLUCOSE UR STRIP-MCNC: NEGATIVE MG/DL
HCT VFR BLD AUTO: 33.2 % (ref 34.8–46.1)
HGB BLD-MCNC: 10.3 G/DL (ref 11.5–15.4)
HGB UR QL STRIP.AUTO: ABNORMAL
INTERVENTRICULAR SEPTUM IN DIASTOLE (PARASTERNAL SHORT AXIS VIEW): 0.9 CM
INTERVENTRICULAR SEPTUM: 0.9 CM (ref 0.6–1.1)
KETONES UR STRIP-MCNC: NEGATIVE MG/DL
LAAS-AP2: 22.5 CM2
LAAS-AP4: 17.6 CM2
LEFT ATRIUM SIZE: 4 CM
LEFT INTERNAL DIMENSION IN SYSTOLE: 2.5 CM (ref 2.1–4)
LEFT VENTRICULAR INTERNAL DIMENSION IN DIASTOLE: 4.2 CM (ref 3.5–6)
LEFT VENTRICULAR POSTERIOR WALL IN END DIASTOLE: 0.8 CM
LEFT VENTRICULAR STROKE VOLUME: 57 ML
LEUKOCYTE ESTERASE UR QL STRIP: ABNORMAL
LVSV (TEICH): 57 ML
LYMPHOCYTES # BLD AUTO: 0.67 THOUSAND/UL (ref 0.6–4.47)
LYMPHOCYTES # BLD AUTO: 6 % (ref 14–44)
Lab: 18.8
MCH RBC QN AUTO: 27.9 PG (ref 26.8–34.3)
MCHC RBC AUTO-ENTMCNC: 31 G/DL (ref 31.4–37.4)
MCV RBC AUTO: 90 FL (ref 82–98)
MONOCYTES # BLD AUTO: 1.12 THOUSAND/UL (ref 0–1.22)
MONOCYTES NFR BLD: 10 % (ref 4–12)
MV E'TISSUE VEL-SEP: 4 CM/S
MV PEAK A VEL: 1.19 M/S
MV PEAK E VEL: 75 CM/S
MV STENOSIS PRESSURE HALF TIME: 54 MS
MV VALVE AREA P 1/2 METHOD: 4.07 CM2
MYELOCYTES NFR BLD MANUAL: 2 % (ref 0–1)
NEUTROPHILS # BLD MANUAL: 8.59 THOUSAND/UL (ref 1.85–7.62)
NEUTS BAND NFR BLD MANUAL: 5 % (ref 0–8)
NEUTS SEG NFR BLD AUTO: 72 % (ref 43–75)
NITRITE UR QL STRIP: NEGATIVE
PH UR STRIP.AUTO: 6 [PH] (ref 4.5–8)
PLATELET # BLD AUTO: 144 THOUSANDS/UL (ref 149–390)
PLATELET BLD QL SMEAR: ADEQUATE
PMV BLD AUTO: 10.9 FL (ref 8.9–12.7)
POIKILOCYTOSIS BLD QL SMEAR: PRESENT
POLYCHROMASIA BLD QL SMEAR: PRESENT
POTASSIUM SERPL-SCNC: 5.2 MMOL/L (ref 3.5–5.3)
PROT UR STRIP-MCNC: ABNORMAL MG/DL
RA PRESSURE ESTIMATED: 8 MMHG
RBC # BLD AUTO: 3.69 MILLION/UL (ref 3.81–5.12)
RBC MORPH BLD: PRESENT
RIGHT ATRIUM AREA SYSTOLE A4C: 8.5 CM2
RIGHT VENTRICLE ID DIMENSION: 3.1 CM
RV PSP: 34 MMHG
SL CV LEFT ATRIUM LENGTH A2C: 5.7 CM
SL CV PED ECHO LEFT VENTRICLE DIASTOLIC VOLUME (MOD BIPLANE) 2D: 79 ML
SL CV PED ECHO LEFT VENTRICLE SYSTOLIC VOLUME (MOD BIPLANE) 2D: 22 ML
SODIUM SERPL-SCNC: 140 MMOL/L (ref 135–147)
SP GR UR STRIP.AUTO: 1.02 (ref 1–1.03)
TARGETS BLD QL SMEAR: PRESENT
TR MAX PG: 26 MMHG
TR PEAK VELOCITY: 2.6 M/S
TRICUSPID VALVE PEAK REGURGITATION VELOCITY: 2.57 M/S
UROBILINOGEN UR QL STRIP.AUTO: 1 E.U./DL
VARIANT LYMPHS # BLD AUTO: 3 %
WBC # BLD AUTO: 11.15 THOUSAND/UL (ref 4.31–10.16)

## 2022-10-17 PROCEDURE — 71045 X-RAY EXAM CHEST 1 VIEW: CPT

## 2022-10-17 PROCEDURE — 93306 TTE W/DOPPLER COMPLETE: CPT | Performed by: INTERNAL MEDICINE

## 2022-10-17 PROCEDURE — 97163 PT EVAL HIGH COMPLEX 45 MIN: CPT

## 2022-10-17 PROCEDURE — 85007 BL SMEAR W/DIFF WBC COUNT: CPT | Performed by: STUDENT IN AN ORGANIZED HEALTH CARE EDUCATION/TRAINING PROGRAM

## 2022-10-17 PROCEDURE — 99232 SBSQ HOSP IP/OBS MODERATE 35: CPT | Performed by: INTERNAL MEDICINE

## 2022-10-17 PROCEDURE — 85027 COMPLETE CBC AUTOMATED: CPT | Performed by: STUDENT IN AN ORGANIZED HEALTH CARE EDUCATION/TRAINING PROGRAM

## 2022-10-17 PROCEDURE — 80048 BASIC METABOLIC PNL TOTAL CA: CPT | Performed by: STUDENT IN AN ORGANIZED HEALTH CARE EDUCATION/TRAINING PROGRAM

## 2022-10-17 PROCEDURE — 93306 TTE W/DOPPLER COMPLETE: CPT

## 2022-10-17 PROCEDURE — 97167 OT EVAL HIGH COMPLEX 60 MIN: CPT

## 2022-10-17 RX ORDER — OLANZAPINE 5 MG/1
5 TABLET ORAL
Status: DISCONTINUED | OUTPATIENT
Start: 2022-10-17 | End: 2022-10-19 | Stop reason: HOSPADM

## 2022-10-17 RX ADMIN — AMLODIPINE BESYLATE 10 MG: 10 TABLET ORAL at 09:33

## 2022-10-17 RX ADMIN — PROPRANOLOL HYDROCHLORIDE 10 MG: 10 TABLET ORAL at 09:33

## 2022-10-17 RX ADMIN — POLYETHYLENE GLYCOL 3350 17 G: 17 POWDER, FOR SOLUTION ORAL at 09:33

## 2022-10-17 RX ADMIN — PANTOPRAZOLE SODIUM 40 MG: 40 TABLET, DELAYED RELEASE ORAL at 06:31

## 2022-10-17 RX ADMIN — LAMOTRIGINE 25 MG: 25 TABLET ORAL at 09:33

## 2022-10-17 RX ADMIN — POTASSIUM CHLORIDE 10 MEQ: 750 TABLET, EXTENDED RELEASE ORAL at 09:33

## 2022-10-17 RX ADMIN — OLANZAPINE 5 MG: 5 TABLET, FILM COATED ORAL at 22:41

## 2022-10-17 RX ADMIN — PROPRANOLOL HYDROCHLORIDE 10 MG: 10 TABLET ORAL at 17:31

## 2022-10-17 RX ADMIN — LACTULOSE 20 G: 20 SOLUTION ORAL at 17:31

## 2022-10-17 RX ADMIN — CARBIDOPA AND LEVODOPA 1 TABLET: 25; 100 TABLET ORAL at 22:40

## 2022-10-17 RX ADMIN — HEPARIN SODIUM 5000 UNITS: 5000 INJECTION INTRAVENOUS; SUBCUTANEOUS at 12:52

## 2022-10-17 RX ADMIN — HEPARIN SODIUM 5000 UNITS: 5000 INJECTION INTRAVENOUS; SUBCUTANEOUS at 06:31

## 2022-10-17 RX ADMIN — POTASSIUM CHLORIDE 10 MEQ: 750 TABLET, EXTENDED RELEASE ORAL at 17:31

## 2022-10-17 RX ADMIN — GABAPENTIN 100 MG: 100 CAPSULE ORAL at 17:00

## 2022-10-17 RX ADMIN — ATORVASTATIN CALCIUM 40 MG: 40 TABLET, FILM COATED ORAL at 09:33

## 2022-10-17 RX ADMIN — CARBIDOPA AND LEVODOPA 1 TABLET: 25; 100 TABLET ORAL at 09:34

## 2022-10-17 RX ADMIN — LACTULOSE 20 G: 20 SOLUTION ORAL at 09:33

## 2022-10-17 RX ADMIN — GABAPENTIN 100 MG: 100 CAPSULE ORAL at 22:41

## 2022-10-17 RX ADMIN — LEVOTHYROXINE SODIUM 100 MCG: 100 TABLET ORAL at 06:31

## 2022-10-17 RX ADMIN — GABAPENTIN 100 MG: 100 CAPSULE ORAL at 09:33

## 2022-10-17 RX ADMIN — HEPARIN SODIUM 5000 UNITS: 5000 INJECTION INTRAVENOUS; SUBCUTANEOUS at 22:41

## 2022-10-17 RX ADMIN — ASPIRIN 81 MG: 81 TABLET, COATED ORAL at 09:33

## 2022-10-17 NOTE — PROGRESS NOTES
INTERNAL MEDICINE RESIDENCY PROGRESS NOTE     Name: Dov Jara   Age & Sex: 69 y o  female   MRN: [id number]  Unit/Bed#: 2 218-01   Encounter: [id number]  Team: SOD Team C     PATIENT INFORMATION     Name: Dov Jara   Age & Sex: 69 y o  female   MRN: [id number]  Hospital Stay Days: 1    ASSESSMENT/PLAN     Principal Problem:    Acute encephalopathy  Active Problems:    Other schizophrenia ([facility] )    Parkinson's disease ([place] [address] )    Primary hypertension    Acquired hypothyroidism    Seizure disorder ([facility] )    Suspected UTI    Respiratory failure, acute ([facility] )    CONCHITA (acute kidney injury) ([facility] )    Hyperammonemia ([place] [address] )      * Acute encephalopathy  Assessment & Plan  -Patient with history of Parkinson's, seizure disorder on Depakote, hypothyroidism and schizophrenia presenting with acute encephalopathy  Per patient's assisted care facility, patient has had a change in her mental status  Also had a fall on Wednesday without any head strike or loss of consciousness secondary to reported leg pain  She was being treated for suspected UTI with Keflex  Her POA, patient has had decreased mental status since prior admission for COVID-19 and ileus in a 2/2022  In the ED, patient was found to be afebrile and hemodynamically stable  She had increased oxygen requirement, requiring 2 L of O2  VBG revealed respiratory alkalosis  She was found to have an CONCHITA with creatinine 1 24 (baseline 0 6 - 0 7)  Ammonia elevated to 60  AST 61 and ALT 58  UA showed innumerable wbc's without any evidence of bacteria or nitrites  Chest x-ray and CT chest showed some mild pulmonary edema without any evidence of discrete infiltrates suggestive of pneumonia  Patient's presentation could be secondary to UTI versus hepatic encephalopathy versus worsening of hypothyroidism versus neurocognitive decline  - TSH, creatinine kinase, magnesium, phosphate, B12, folate WNL  -Depakote switched to Lamictal 20 mg  - previously on lactulose 20 g b i d  => has had only 1 bowel movement overnight => unclear whether this has led to a change in her mental status => AAOx3 today and appears to be back to baseline => Consider stopping lactulose   - urine culture negative  - continue with IV ceftriaxone 1 g Q 24  - continue with home carbidopa levodopa and Zyprexa  - consider geriatrics consult given advanced age, comorbidities, and recent change in mental status  - made level 1 code status based on conversation with facility  However, given that POA was expressed desire for level 3 should reassess in a m  Hyperammonemia ([facility] )  Assessment & Plan  -patient presenting with acute encephalopathy  This found to have ammonia 60, AST 61, ALT 58  Was on Depakote for seizures  Cannot rule out possibility of hepatic encephalopathy  -Appears to be back to baseline mental status, per conversation with POA and Facility  - previously on lactulose 20 g b i d  => only one bowel movement overnight => Consider stopping    CONCHITA (acute kidney injury) ([facility] )  Assessment & Plan  -on admission, creatinine 1 24 with baseline 0 6-0 8  Of note, her GFR has decreased to 42 from 85 in 02/2022  Her CONCHITA is likely early secondary to prerenal causes  Suspect she had poor p o  Intake secondary to her UTI  Cannot rule out possibility of urinary retention as cause for CONCHITA  Appears volume down on exam   - Creatinine 1 24 on presentation => Given gentle IVF => Improved to 0 96 today  - Lisinopril initially held => Given CONCHITA has resolved => Consider adding back lisinopril  - avoid hypotension and nephrotoxic agents      Respiratory failure, acute ([facility] )  Assessment & Plan  -On presentation, patient was placed on 2 L O2 and was satting at around 95-96%  Per chart review, patient was on supplemental oxygen during last admission in February 2022 and was weaned off by discharge  Chest x-ray and chest CT concerning for pulmonary edema  No noted previous echocardiograms on file    -   - Echo pending   -Still requiring 2-3L O2   - continue to wean O2 as tolerated  -Consider diuresis based on results of echo  - Patient placed off oxygen on AM of 10/16 and ambulated to bathroom without distress  Suspected UTI  Assessment & Plan  -Patient was having dysuria, could be renal and altered mental status at facility  PTA she had a UA concerning for and pyuria  Was also reported to have low-grade temp of 100 1°  Was started on Keflex of which she received 3 days  Her urine culture from facility grew skin say  UA in the ED showed innumerable wbc's without any evidence of nitrites or bacteria  Was given 1 dose Rocephin in ED  UTI could potentially be contributing to patient's current presentation of acute encephalopathy  - urine culture negative  - on IV ceftriaxone 1 g Q 24 (on Day 3) => Can consider d/c'ing given was treated with 3-days keflex yesterday     Seizure disorder [facility])  Assessment & Plan  -patient is on Depakote 250 mg a m  And Depakote 500 mg at bedtime  However, patient is presenting with acute encephalopathy and was find to have hyperammonemia with ammonia 60  Have some concern for hepatic encephalopathy  - Depakote level: 59  - Depakote held on admission over concern for hepatic encephalopathy  - neurology was consulted and believed that patient did not have any seizure history per chart review and conversation was POA  Depakote was initiated for mood stabilization  Recommended starting Lamictal 20 mg daily    Acquired hypothyroidism  Assessment & Plan  -patient presenting with acute encephalopathy  Has a history of acquired hypothyroidism and is on levothyroxine 100 mcg at home    Most recent TSH on 0 1/31 was 5 14  - TSH at admission was normal at 3 690  - continue home levothyroxine 100 mcg    Primary hypertension  Assessment & Plan  -at home on propranolol 10 mg b i d , lisinopril 10 mg daily, amlodipine 10 mg daily  - lisinopril initially held in setting of CONCHITA => CONCHITA has resolved consider adding back  - continue home propranolol and amlodipine    Parkinson's disease ([facility] )  Assessment & Plan  -continue home carbidopa levodopa  mg b i d  Other schizophrenia ([facility] )  Assessment & Plan  -reported to be on Zyprexa 7 5 mg at home  - will continue on admission      Disposition:  Pending medical stabilization  SUBJECTIVE     Yesterday, switch to Lamictal 20 mg  No acute events overnight  This morning patient states she was having nausea yesterday meals  Otherwise she has no acute complaints  chest pain, shortness of breath, vomiting, diarrhea constipation    OBJECTIVE     Vitals:    10/16/22 1610 10/16/22 1714 10/16/22 2342 10/17/22 0600   BP:  134/73 129/80    BP Location:  Left arm     Pulse: 91  82    Resp: 18      Temp:       TempSrc:       SpO2: 90%      Weight:    101 kg (221 lb 9 6 oz)      Temperature:   No data recorded  Temperature: 99 1 °F (37 3 °C)  Intake & Output:  I/O       10/15 0701  10/16 0700 10/16 0701  10/17 0700    I V  (mL/kg) 597 5 (5 9)     IV Piggyback 1000     Total Intake(mL/kg) 1597 5 (15 8)     Urine (mL/kg/hr) 600 1300 (0 5)    Total Output 600 1300    Net +997 5 -1300              Weights:        Body mass index is 35 77 kg/m²  Weight (last 2 days)     Date/Time Weight    10/17/22 0600 101 (221 6)    10/16/22 0600 101 (222)        Physical Exam  Constitutional:       General: She is not in acute distress  Appearance: She is obese  HENT:      Head: Normocephalic and atraumatic  Mouth/Throat:      Mouth: Mucous membranes are dry  Pharynx: Oropharynx is clear  Eyes:      Conjunctiva/sclera: Conjunctivae normal    Cardiovascular:      Rate and Rhythm: Normal rate and regular rhythm  Heart sounds: No murmur heard  No friction rub  No gallop  Pulmonary:      Effort: Pulmonary effort is normal  No respiratory distress  Breath sounds: No wheezing or rales        Comments: No appreciable rales in anterior lung fields  Abdominal: General: There is no distension  Palpations: Abdomen is soft  Tenderness: There is no abdominal tenderness  There is no guarding  Musculoskeletal:      Right lower leg: Edema present  Left lower leg: Edema present  Comments: Had 1+ pitting edema bilaterally; tenderness to palpation of bilateral lower extremity   Skin:     General: Skin is warm and dry  Neurological:      Mental Status: She is alert and oriented to person, place, and time  Comments: Oriented to person, place, and time; tangential and easier to redirect in conversation today   Psychiatric:      Comments: Tangential in conversation       LABORATORY DATA     Labs: I have personally reviewed pertinent reports  Results from last 7 days   Lab Units 10/17/22  0529 10/16/22  0613 10/15/22  1301   WBC Thousand/uL 11 15* 9 00 7 97   HEMOGLOBIN g/dL 10 3* 11 9 10 0*   HEMATOCRIT % 33 2* 37 6 31 6*   PLATELETS Thousands/uL 144* 105* 126*   NEUTROS PCT %  --  68 72   MONOS PCT %  --  19* 15*   MONO PCT % 10  --   --       Results from last 7 days   Lab Units 10/17/22  0529 10/16/22  0613 10/15/22  1301   POTASSIUM mmol/L 5 2 4 7 4 5   CHLORIDE mmol/L 110* 112* 112*   CO2 mmol/L 27 24 23   BUN mg/dL 36* 40* 52*   CREATININE mg/dL 0 96 0 94 1 24   CALCIUM mg/dL 9 4 9 4 8 9   ALK PHOS U/L  --   --  73   ALT U/L  --   --  58   AST U/L  --   --  61*     Results from last 7 days   Lab Units 10/16/22  0613   MAGNESIUM mg/dL 2 4     Results from last 7 days   Lab Units 10/16/22  0613   PHOSPHORUS mg/dL 2 7                    IMAGING & DIAGNOSTIC TESTING     Radiology Results: I have personally reviewed pertinent reports  XR chest 1 view portable    Result Date: 10/16/2022  Impression: Low lung volumes with vascular crowding with mild bibasilar atelectasis and pulmonary venous congestion  Workstation performed: [id number]     CT head without contrast    Result Date: 10/15/2022  Impression: No acute intracranial abnormality   Workstation performed: [id number]     CT chest without contrast    Result Date: 10/15/2022  Impression: Markedly compromised by respiratory motion with probable mild interstitial edema  Trace effusions with mild bibasilar atelectasis  Workstation performed: [id number]     CT cervical spine without contrast    Result Date: 10/15/2022  Impression: No cervical spine fracture or traumatic malalignment  Severe degenerative disc disease from C3 to C7  Workstation performed: [id number]     Other Diagnostic Testing: I have personally reviewed pertinent reports  ACTIVE MEDICATIONS     Current Facility-Administered Medications   Medication Dose Route Frequency   • acetaminophen (TYLENOL) tablet 650 mg  650 mg Oral Q8H PRN   • amLODIPine (NORVASC) tablet 10 mg  10 mg Oral Daily   • aspirin (ECOTRIN LOW STRENGTH) EC tablet 81 mg  81 mg Oral Daily   • atorvastatin (LIPITOR) tablet 40 mg  40 mg Oral Daily   • carbidopa-levodopa (SINEMET)  mg per tablet 1 tablet  1 tablet Oral BID   • ceftriaxone (ROCEPHIN) 1 g/50 mL in dextrose IVPB  1,000 mg Intravenous Q24H   • gabapentin (NEURONTIN) capsule 100 mg  100 mg Oral TID   • heparin (porcine) subcutaneous injection 5,000 Units  5,000 Units Subcutaneous Q8H [address]   • lactulose oral solution 20 g  20 g Oral BID   • lamoTRIgine (LaMICtal) tablet 25 mg  25 mg Oral Daily   • levothyroxine tablet 100 mcg  100 mcg Oral Early Morning   • OLANZapine (ZyPREXA) tablet 7 5 mg  7 5 mg Oral HS   • pantoprazole (PROTONIX) EC tablet 40 mg  40 mg Oral Early Morning   • polyethylene glycol (MIRALAX) packet 17 g  17 g Oral BID   • potassium chloride (K-DUR,KLOR-CON) CR tablet 10 mEq  10 mEq Oral BID   • propranolol (INDERAL) tablet 10 mg  10 mg Oral BID   • senna (SENOKOT) tablet 8 6 mg  1 tablet Oral HS PRN       VTE Pharmacologic Prophylaxis: Heparin  VTE Mechanical Prophylaxis: SCD    Portions of the record may have been created with voice recognition software    Occasional wrong word or "sound a like" substitutions may have occurred due to the inherent limitations of voice recognition software    Read the chart carefully and recognize, using context, where substitutions have occurred   ==  [facility]  Internal Medicine Residency PGY-1

## 2022-10-17 NOTE — PLAN OF CARE
Problem: PHYSICAL THERAPY ADULT  Goal: Performs mobility at highest level of function for planned discharge setting  See evaluation for individualized goals  Description: Treatment/Interventions: OT, Spoke to nursing, Gait training, Bed mobility, Patient/family training, Endurance training, LE strengthening/ROM, Functional transfer training          See flowsheet documentation for full assessment, interventions and recommendations  Note: Prognosis: Good  Problem List: Decreased strength, Decreased range of motion, Decreased endurance, Impaired balance, Decreased mobility, Decreased coordination, Decreased cognition, Impaired judgement, Decreased safety awareness, Pain  Assessment: Pt is 69 y o  female seen for PT evaluation s/p admit [facility]2022 w/ Acute encephalopathy and UTI  PT consulted to assess pt's functional mobility and d/c needs  Order placed for PT eval and tx, w/ up w/ A order  Comorbidities affecting pt's physical performance at time of assessment include:  has a past medical history of Disease of thyroid gland, GERD (gastroesophageal reflux disease), Hypertension, Hypoglycemia, Mood disorder ([facility] ), Neuropathy, Osteoporosis, Parkinson disease ([facility] ), Schizophrenia ([facility] ), Seizure ([facility] ), Urinary incontinence, and Vitamin D deficiency  PTA, pt was ambulating with RW and recieved A from facility staff    Personal factors affecting pt at time of IE include: ambulating w/ assistive device, stairs to enter home, inability to ambulate household distances, decreased cognition, limited home support, decreased initiation and engagement, unable to perform physical activity, limited insight into impairments, inability to perform IADLs and inability to perform ADLs   Please find objective findings from PT assessment regarding body systems outlined above with impairments and limitations including weakness, decreased ROM, impaired balance, decreased endurance, impaired coordination, gait deviations, pain, decreased activity tolerance, decreased functional mobility tolerance, decreased safety awareness, impaired judgement, fall risk and decreased cognition  Pt required A for LE management and to upright trunk during bed mobility with reports of "stiffness"  Tolerated sitting EOB without increased A for balance  Required A to complete transfers with deficits in balance  Ambulated with slow gait, decreased foot clearance, and decreased activity tolerance  Required verbal instruction to improve chair approach with uncontrolled transfer  The following objective measures performed on IE also reveal limitations: The patient's AM-PAC Basic Mobility Inpatient Short Form Raw Score is 12, Standardized Score is 32 23  A standardized score less than 42 9 suggests the patient may benefit from discharge to post-acute rehabilitation services  Barriers to Discharge: Inaccessible home environment, Decreased caregiver support     PT Discharge Recommendation: Return to facility with rehabilitation services (if facility able to A at current level, if unable to A pt will require rehab)    See flowsheet documentation for full assessment

## 2022-10-17 NOTE — PLAN OF CARE
Problem: OCCUPATIONAL THERAPY ADULT  Goal: Performs self-care activities at highest level of function for planned discharge setting  See evaluation for individualized goals  Description: Treatment Interventions: ADL retraining, Functional transfer training, Endurance training, Patient/family training, Equipment evaluation/education, Compensatory technique education, Activityengagement          See flowsheet documentation for full assessment, interventions and recommendations  Note: Limitation: Decreased ADL status, Decreased Safe judgement during ADL, Decreased cognition, Decreased endurance, Decreased self-care trans  Prognosis: Good, Fair  Assessment: Pt is a 69 y o  female who was admitted to [facility] on 10/15/2022 with Acute encephalopathy   Pt's problem list also includes PMH of HTN, underlying neurological disorder, previous surgery and Schizophrenia, Parkinson's, hypothyroid, seizure, CONCHITA  At baseline pt was completing adls with assist from facility staff - has assist for iadls  Pt lives at [address]  Currently pt requires max assist  for overall ADLS and mod a x 2  for functional mobility/transfers  Pt currently presents with impairments in the following categories -difficulty performing ADLS and environment activity tolerance, endurance, standing balance/tolerance and sitting balance/tolerance  These impairments, as well as pt's fatigue, pain, decreased caregiver support, risk for falls and home environment  limit pt's ability to safely engage in all baseline areas of occupation, includingbathing, dressing, toileting, functional mobility/transfers, community mobility, social participation  and leisure activities  From OT standpoint, recommendations will depend upon amount of assist facility is able to provide- if facility cannot manage pt's current care needs upon D/C pt will need inpt rehab - OT will continue to follow to address the below stated goals       OT Discharge Recommendation: Return to facility with rehabilitation services (vs inpt rehab)

## 2022-10-17 NOTE — OCCUPATIONAL THERAPY NOTE
Occupational Therapy Evaluation     Patient Name: Aimee Chapman  CXLIX'P Date: 10/17/2022  Problem List  Principal Problem:    Acute encephalopathy  Active Problems:    Other schizophrenia ([facility] )    Parkinson's disease ([facility] )    Primary hypertension    Acquired hypothyroidism    Seizure disorder ([facility] )    Suspected UTI    Respiratory failure, acute ([facility] )    CONCHITA (acute kidney injury) ([facility] )    Hyperammonemia ([facility] )    Past Medical History  Past Medical History:   Diagnosis Date    Disease of thyroid gland     GERD (gastroesophageal reflux disease)     Hypertension     Hypoglycemia 2/2/2022    Mood disorder ([facility] )     Neuropathy     Osteoporosis     Parkinson disease ([facility] )     Schizophrenia ([facility] )     Seizure ([facility] )     Urinary incontinence     Vitamin D deficiency      Past Surgical History  History reviewed  No pertinent surgical history  10/17/22 1155   OT Last Visit   OT Visit Date 10/17/22   Note Type   Note type Evaluation   Pain Assessment   Pain Assessment Tool 0-10   Pain Score No Pain   Restrictions/Precautions   Weight Bearing Precautions Per Order No   Other Precautions Bed Alarm; Fall Risk;Pain;O2;Multiple lines   Home Living   Type of Home Assisted living  ([facility])   Home Layout One level   Prior Function   Level of [place] Needs assistance with ADLs; Needs assistance with functional mobility; Needs assistance with [address] staff   Receives Help From Family;Personal care attendant   IADLs Family/Friend/Other provides transportation; Family/Friend/Other provides meals; Family/Friend/Other provides medication management   Vocational Retired   Lifestyle   Autonomy Receives assist with adls and iadls   Reciprocal Relationships supportive family and facility staff   Service to Others retired   Intrinsic Gratification sedentary   [address] [facility] offers no c/o   ADL   Eating Assistance 5  Supervision/Setup   Grooming Assistance 5  [address] [facility] 4  Minimal Assistance   LB Bathing Assistance 2  Maximal Assistance   UB Dressing Assistance 4  Minimal Assistance   LB Dressing Assistance 2  Maximal Assistance   Toileting Assistance  2  Maximal Assistance   Bed Mobility   Supine to Sit 3  Moderate assistance   Additional items Assist x 2   Sit to Supine 3  Moderate assistance   Additional items Assist x 2   Transfers   Sit to Stand 3  Moderate assistance   Stand to Sit 4  Minimal assistance   Additional Comments mod a to take several lateral side steps towards [facility]   Functional Mobility   Functional Mobility 3  Moderate assistance   Additional items Rolling walker   Balance   Static Sitting Fair   Dynamic Sitting Fair -   Static Standing Poor +   Dynamic Standing Poor   Ambulatory Poor   Activity Tolerance   Activity Tolerance Patient limited by fatigue;Patient limited by pain;Treatment limited secondary to medical complications (Comment)   RUE Assessment   RUE Assessment WFL   LUE Assessment   LUE Assessment WFL   Cognition   Arousal/Participation Arousable; Cooperative   Attention Attends with cues to redirect   Orientation Level Oriented to person;Oriented to time;Oriented to place;Oriented to situation  (general time)   Memory Decreased short term memory;Decreased recall of recent events;Decreased recall of precautions   Following Commands Follows one step commands with increased time or repetition   Assessment   Limitation Decreased ADL status; Decreased Safe judgement during ADL;Decreased cognition;Decreased endurance;Decreased self-care trans   Prognosis Good;Fair   Assessment Pt is a 69 y o  female who was admitted to [facility] on 10/15/2022 with Acute encephalopathy   Pt's problem list also includes PMH of HTN, underlying neurological disorder, previous surgery and Schizophrenia, Parkinson's, hypothyroid, seizure, CONCHITA  At baseline pt was completing adls with assist from facility staff - has assist for iadls  Pt lives at [address]  Currently pt requires max assist  for overall ADLS and mod a x 2  for functional mobility/transfers  Pt currently presents with impairments in the following categories -difficulty performing ADLS and environment activity tolerance, endurance, standing balance/tolerance and sitting balance/tolerance  These impairments, as well as pt's fatigue, pain, decreased caregiver support, risk for falls and home environment  limit pt's ability to safely engage in all baseline areas of occupation, includingbathing, dressing, toileting, functional mobility/transfers, community mobility, social participation  and leisure activities  From OT standpoint, recommendations will depend upon amount of assist facility is able to provide- if facility cannot manage pt's current care needs upon D/C pt will need inpt rehab - OT will continue to follow to address the below stated goals  Goals   Patient Goals rest   LTG Time Frame 10-14   Long Term Goal #1 refer to established goals below   Plan   Treatment Interventions ADL retraining;Functional transfer training; Endurance training;Patient/family training;Equipment evaluation/education; Compensatory technique education; Activityengagement   Goal Expiration Date 10/31/22   OT Frequency 3-5x/wk   Recommendation   OT Discharge Recommendation Return to facility with rehabilitation services  (vs inpt rehab)   AM-PAC Daily Activity Inpatient   Lower Body Dressing 2   Bathing 2   Toileting 2   Upper Body Dressing 2   Grooming 3   Eating 3   Daily Activity Raw Score 14   Daily Activity Standardized Score (Calc for Raw Score >=11) 33 39   AM-PAC Applied Cognition Inpatient   Following a Speech/Presentation 3   Understanding Ordinary Conversation 4   Taking Medications 3   Remembering Where Things Are Placed or Put Away 3   Remembering List of 4-5 Errands 2   Taking Care of Complicated Tasks 2   Applied Cognition Raw Score 17   Applied Cognition Standardized Score 36 52   End of Consult   Patient Position at End of Consult Supine; All needs within reach;Bed/Chair alarm activated     OCCUPATIONAL THERAPY GOALS:    *Min a  adls after setup with use of AE PRN  *Min a toileting and clothing management   *Min a functional mobility and transfers to/from all surfaces with fair to fair+ dynamic balance and safety for participation in dynamic adls and iadl tasks   *Demonstrate fair+[place] with safe use of RW during functional tasks   *Assess DME needs   *Increase activity tolerance to 30-35 minutes for participation in adls and enjoyable activities  *Assist with safe d/c recommendations     The patient's raw score on the AM-PAC Daily Activity inpatient short form is 14, standardized score is 33 39, less than 39 4  Patients at this level are likely to benefit from discharge to post-acute rehabilitation services  Please refer to the recommendation of the Occupational Therapist for safe discharge planning        [facility]

## 2022-10-17 NOTE — PHYSICAL THERAPY NOTE
Physical Therapy Evaluation     Patient's Name: Jacy Goncalves    Admitting Diagnosis  Acute encephalopathy [G93 40]  Sepsis ([facility] ) [A41 9]    Problem List  Patient Active Problem List   Diagnosis    Other schizophrenia ([place] [address] )    Parkinson's disease ([facility] )    Primary hypertension    Urinary incontinence    Acquired hypothyroidism    COVID-19    Ileus ([facility] )    Hyponatremia    Seizure disorder ([facility] )    Suspected UTI    Acute encephalopathy    Respiratory failure, acute ([facility] )    CONCHITA (acute kidney injury) ([facility] )    Hyperammonemia ([facility] )    Thrombocytopenia ([facility] )       Past Medical History  Past Medical History:   Diagnosis Date    Disease of thyroid gland     GERD (gastroesophageal reflux disease)     Hypertension     Hypoglycemia 2/2/2022    Mood disorder ([place] [address] )     Neuropathy     Osteoporosis     Parkinson disease ([facility] )     Schizophrenia ([facility] )     Seizure ([facility] )     Urinary incontinence     Vitamin D deficiency        Past Surgical History  History reviewed  No pertinent surgical history  10/17/22 1156   PT Last Visit   PT Visit Date 10/17/22   Note Type   Note type Evaluation   Pain Assessment   Pain Assessment Tool 0-10   Pain Score No Pain  (c/o of stiffness with movement)   Hospital Pain Intervention(s) Repositioned   Restrictions/Precautions   Weight Bearing Precautions Per Order No   Other Precautions Pain; Fall Risk;Multiple lines; Bed Alarm; Chair Alarm;Cognitive;O2   Home Living   Type of Home Assisted living  [facility] comfort home)   Home Layout One level   Prior Function   Level of [place] Needs assistance with functional mobility   Lives With Facility staff   Receives Help From Personal care attendant   Vocational Retired   General   Family/Caregiver Present No   Cognition   Orientation Level Oriented to person;Oriented to place;Oriented to situation   RLE Assessment   RLE Assessment   (grossly 3-/5 with movement)   LLE Assessment   LLE Assessment   (grossly 3-/5 with movement)   Coordination   Movements are Fluid and Coordinated 0   Coordination and Movement Description slow and guarded with pain, fatigue, generalized weakness   Bed Mobility   Supine to Sit 3  Moderate assistance   Additional items Assist x 2   Sit to Supine 3  Moderate assistance   Additional items Assist x 2   Transfers   Sit to Stand 3  Moderate assistance   Additional items Assist x 1   Stand to Sit 4  Minimal assistance   Additional items Assist x 1   Ambulation/Elevation   Gait pattern Short stride; Excessively slow; Shuffling;Decreased foot clearance; Wide DANK   Gait Assistance 3  Moderate assist   Additional items Assist x 1   Assistive Device Rolling walker   Distance 3'  (laterally EOB)   Balance   Static Sitting Fair   Dynamic Sitting Fair -   Static Standing Poor +   Dynamic Standing Poor   Ambulatory Poor   Endurance Deficit   Endurance Deficit Yes   Endurance Deficit Description limited by fatigue, generalized weakness, fatigue   Activity Tolerance   Activity Tolerance Patient limited by fatigue;Patient limited by pain   Medical Staff Made Aware OT for D/C planning   Assessment   Prognosis Good   Problem List Decreased strength;Decreased range of motion;Decreased endurance; Impaired balance;Decreased mobility; Decreased coordination;Decreased cognition; Impaired judgement;Decreased safety awareness;Pain   Assessment Pt is 69 y o  female seen for PT evaluation s/p admit to [facility] on 10/15/2022 w/ Acute encephalopathy and UTI  PT consulted to assess pt's functional mobility and d/c needs  Order placed for PT eval and tx, w/ up w/ A order  Comorbidities affecting pt's physical performance at time of assessment include:  has a past medical history of Disease of thyroid gland, GERD (gastroesophageal reflux disease), Hypertension, Hypoglycemia, Mood disorder ([facility] ), Neuropathy, Osteoporosis, Parkinson disease ([facility] ), Schizophrenia ([facility] ), Seizure ([facility] ), Urinary incontinence, and Vitamin D deficiency   PTA, pt was ambulating with RW and recieved A from facility staff    Personal factors affecting pt at time of IE include: ambulating w/ assistive device, stairs to enter home, inability to ambulate household distances, decreased cognition, limited home support, decreased initiation and engagement, unable to perform physical activity, limited insight into impairments, inability to perform IADLs and inability to perform ADLs  Please find objective findings from PT assessment regarding body systems outlined above with impairments and limitations including weakness, decreased ROM, impaired balance, decreased endurance, impaired coordination, gait deviations, pain, decreased activity tolerance, decreased functional mobility tolerance, decreased safety awareness, impaired judgement, fall risk and decreased cognition  Pt required A for LE management and to upright trunk during bed mobility with reports of "stiffness"  Tolerated sitting EOB without increased A for balance  Required A to complete transfers with deficits in balance  Ambulated with slow gait, decreased foot clearance, and decreased activity tolerance  Required verbal instruction to improve chair approach with uncontrolled transfer  The following objective measures performed on IE also reveal limitations: The patient's AM-PAC Basic Mobility Inpatient Short Form Raw Score is 12, Standardized Score is 32 23  A standardized score less than 42 9 suggests the patient may benefit from discharge to post-acute rehabilitation services  Please also refer to the recommendation of the Physical Therapist for safe discharge planning  Pt's clinical presentation is currently unstable/unpredictable seen in pt's presentation of ongoing medical workup  Pt to benefit from continued PT tx to address deficits as defined above and maximize level of functional independent mobility and consistency   From PT/mobility standpoint, recommendation at time of d/c would be return to facility with rehabilitation services pending progress in order to facilitate return to PLOF  Barriers to Discharge Inaccessible home environment;Decreased caregiver support   Goals   Patient Goals none stated   STG Expiration Date 10/29/22   Short Term Goal #1 1  Complete bed mobility and transfers I to decrease need for caregiver in home  2  Ambulate 200' I to complete household and community mobility without A  3  Improve dynamic balance to good to decrease need for UE support during ambulation  Plan   Treatment/Interventions OT; Spoke to nursing;Gait training;Bed mobility; Patient/family training; Endurance training;LE strengthening/ROM; Functional transfer training   PT Frequency 2-3x/wk   Recommendation   PT Discharge Recommendation Return to facility with rehabilitation services  (if facility able to A at current level, if unable to A pt will require rehab)   Sole 8 in Bed Without Bedrails 2   Lying on Back to Sitting on Edge of Flat Bed 2   Moving Bed to Chair 2   Standing Up From Chair 2   Walk in Room 2   Climb 3-5 Stairs 2   Basic Mobility Inpatient Raw Score 12   Basic Mobility Standardized Score 32 23   Highest Level Of Mobility   -[facility] Goal 4: Move to chair/commode   -HL Achieved 4: Move to chair/commode         [employer]

## 2022-10-17 NOTE — CASE MANAGEMENT
Case Management Assessment & Discharge Planning Note    Patient name Andrew Sport  Location 2 [id number]/CW2 [id number] MRN [id number]  : 1945 Date 10/17/2022       Current Admission Date: 10/15/2022  Current Admission Diagnosis:Acute encephalopathy   Patient Active Problem List    Diagnosis Date Noted   • Acute encephalopathy 10/15/2022   • Respiratory failure, acute ([address] ) 10/15/2022   • CONCHITA (acute kidney injury) ([address] ) 10/15/2022   • Hyperammonemia ([address] ) 10/15/2022   • Thrombocytopenia ([address] ) 10/15/2022   • Suspected UTI 2022   • Other schizophrenia ([address] ) 2022   • Parkinson's disease ([address] ) 2022   • Primary hypertension 2022   • Urinary incontinence 2022   • Acquired hypothyroidism 2022   • COVID-19 2022   • Ileus ([address] ) 2022   • Hyponatremia 2022   • Seizure disorder ([address] ) 2022      LOS (days): 1  Geometric Mean LOS (GMLOS) (days): 3 50  Days to GMLOS:2 3     OBJECTIVE:    Risk of Unplanned Readmission Score: 19 79         Current admission status: Inpatient       Preferred Pharmacy:   [facility]  No address on file      Primary Care Provider: Radha Piper MD    Primary Insurance: MEDICARE  Secondary Insurance: [place] [employer] [place]    ASSESSMENT:  [address] Proxies    There are no active Health Care Proxies on file                   Readmission Root Cause  30 Day Readmission: No    Patient Information  Admitted from[de-identified] Facility (long-term resident at [facility] )  Mental Status: Confused  During Assessment patient was accompanied by: Not accompanied during assessment  Assessment information provided by[de-identified] Other - please comment (EULOGIO Leigh)  Support Systems: Other (Comment) ([address])  [place] of Residence: [address] [facility] do you live in?: [facility]  Type of Current Residence: Facility  Upon entering residence, is there a bedroom on the main floor (no further steps)?: Yes  Upon entering residence, is there a bathroom on the main floor (no further steps)?: Yes  In the last 12 months, was there a time when you were not able to pay the mortgage or rent on time?: No  In the last 12 months, was there a time when you did not have a steady place to sleep or slept in a shelter (including now)?: No  Homeless/housing insecurity resource given?: N/A  Living Arrangements: Other (Comment) (skilled nursing facility)  Is patient a ?: No    Activities of Daily Living Prior to Admission  Functional Status: Assistance  Completes ADLs independently?: No  Level of ADL dependence: Assistance  Ambulates independently?: Yes  Does patient use assisted devices?: Yes  Assisted Devices (DME) used: Gailen Perry  Does patient currently own DME?: Yes  What DME does the patient currently own?: Gailen Perry  Does the patient have a history of Short-Term Rehab?: Yes         Patient Information Continued  Income Source: Pension/[facility]  Does patient have prescription coverage?: Yes  Within the past 12 months, you worried that your food would run out before you got the money to buy more : Never true  Within the past 12 months, the food you bought just didn't last and you didn't have money to get more : Never true  Food insecurity resource given?: N/A  Does patient receive dialysis treatments?: No         Means of Transportation  In the past 12 months, has lack of transportation kept you from medical appointments or from getting medications?: No  In the past 12 months, has lack of transportation kept you from meetings, work, or from getting things needed for daily living?: No        DISCHARGE DETAILS:    Discharge planning discussed with[de-identified] patient's Skyler Reynolds, staff at [facility]  Freedom of Choice: Yes  Comments - Freedom of Choice: return to [facility]  CM contacted family/caregiver?: Yes  Were Treatment Team discharge recommendations reviewed with patient/caregiver?: Yes  Did patient/caregiver verbalize understanding of patient care needs?: Yes  Were patient/caregiver advised of the risks associated with not following Treatment Team discharge recommendations?: Yes    Contacts  Patient Contacts: EULOGIO Tong  Relationship to Patient[de-identified] Other (Comment)  Contact Method: Phone  Phone Number: [phone]  Reason/Outcome: Discharge Planning, Emergency Contact, Continuity of Care              Other Referral/Resources/Interventions Provided:  Interventions: Facility Return         Treatment Team Recommendation: Facility Return  Discharge Destination Plan[de-identified] Facility Return  Transport at Discharge : BLS Ambulance                Patient/caregiver received discharge checklist   Content reviewed  Patient/caregiver encouraged to participate in discharge plan of care prior to discharge home  CM reviewed d/c planning process including the following: identifying help at home, patient preference for d/c planning needs, Discharge Lounge, [facility] Meds to Bed program, availability of treatment team to discuss questions or concerns patient and/or family may have regarding understanding medications and recognizing signs and symptoms once discharged  CM also encouraged patient to follow up with all recommended appointments after discharge  Patient advised of importance for patient and family to participate in managing patient’s medical well being  Additional Comments: Patient is a longt-term resident at [facility], where she is normally AAO x3, ambulatory with rolling walker, and can complete self-care with some assistance with dressing  Staff state that patient has "not been herself" prior to current admission  Patient's POA is friend Chay Patel, plan to return to [address] [place] [address] where she is a bedhold

## 2022-10-18 ENCOUNTER — APPOINTMENT (INPATIENT)
Dept: RADIOLOGY | Facility: HOSPITAL | Age: 77
DRG: 441 | End: 2022-10-18
Payer: COMMERCIAL

## 2022-10-18 LAB
AMMONIA PLAS-SCNC: 44 UMOL/L (ref 11–35)
ANION GAP SERPL CALCULATED.3IONS-SCNC: 7 MMOL/L (ref 4–13)
BACTERIA UR CULT: ABNORMAL
BUN SERPL-MCNC: 32 MG/DL (ref 5–25)
CALCIUM SERPL-MCNC: 9.3 MG/DL (ref 8.3–10.1)
CHLORIDE SERPL-SCNC: 107 MMOL/L (ref 96–108)
CO2 SERPL-SCNC: 25 MMOL/L (ref 21–32)
CREAT SERPL-MCNC: 0.96 MG/DL (ref 0.6–1.3)
ERYTHROCYTE [DISTWIDTH] IN BLOOD BY AUTOMATED COUNT: 15.5 % (ref 11.6–15.1)
GFR SERPL CREATININE-BSD FRML MDRD: 57 ML/MIN/1.73SQ M
GLUCOSE SERPL-MCNC: 168 MG/DL (ref 65–140)
HCT VFR BLD AUTO: 35.2 % (ref 34.8–46.1)
HGB BLD-MCNC: 11.1 G/DL (ref 11.5–15.4)
MCH RBC QN AUTO: 28.2 PG (ref 26.8–34.3)
MCHC RBC AUTO-ENTMCNC: 31.5 G/DL (ref 31.4–37.4)
MCV RBC AUTO: 89 FL (ref 82–98)
PLATELET # BLD AUTO: 203 THOUSANDS/UL (ref 149–390)
PMV BLD AUTO: 9.9 FL (ref 8.9–12.7)
POTASSIUM SERPL-SCNC: 4.6 MMOL/L (ref 3.5–5.3)
RBC # BLD AUTO: 3.94 MILLION/UL (ref 3.81–5.12)
SODIUM SERPL-SCNC: 139 MMOL/L (ref 135–147)
WBC # BLD AUTO: 13.61 THOUSAND/UL (ref 4.31–10.16)

## 2022-10-18 PROCEDURE — 74176 CT ABD & PELVIS W/O CONTRAST: CPT

## 2022-10-18 PROCEDURE — 85027 COMPLETE CBC AUTOMATED: CPT

## 2022-10-18 PROCEDURE — 99232 SBSQ HOSP IP/OBS MODERATE 35: CPT | Performed by: INTERNAL MEDICINE

## 2022-10-18 PROCEDURE — 80048 BASIC METABOLIC PNL TOTAL CA: CPT

## 2022-10-18 PROCEDURE — G1004 CDSM NDSC: HCPCS

## 2022-10-18 PROCEDURE — 82140 ASSAY OF AMMONIA: CPT

## 2022-10-18 RX ORDER — LISINOPRIL 10 MG/1
10 TABLET ORAL DAILY
Status: DISCONTINUED | OUTPATIENT
Start: 2022-10-18 | End: 2022-10-19 | Stop reason: HOSPADM

## 2022-10-18 RX ADMIN — GABAPENTIN 100 MG: 100 CAPSULE ORAL at 17:02

## 2022-10-18 RX ADMIN — POTASSIUM CHLORIDE 10 MEQ: 750 TABLET, EXTENDED RELEASE ORAL at 17:02

## 2022-10-18 RX ADMIN — GABAPENTIN 100 MG: 100 CAPSULE ORAL at 09:51

## 2022-10-18 RX ADMIN — CARBIDOPA AND LEVODOPA 1 TABLET: 25; 100 TABLET ORAL at 09:52

## 2022-10-18 RX ADMIN — HEPARIN SODIUM 5000 UNITS: 5000 INJECTION INTRAVENOUS; SUBCUTANEOUS at 21:30

## 2022-10-18 RX ADMIN — GABAPENTIN 100 MG: 100 CAPSULE ORAL at 21:30

## 2022-10-18 RX ADMIN — ATORVASTATIN CALCIUM 40 MG: 40 TABLET, FILM COATED ORAL at 09:51

## 2022-10-18 RX ADMIN — ACETAMINOPHEN 650 MG: 325 TABLET, FILM COATED ORAL at 09:57

## 2022-10-18 RX ADMIN — CEFTRIAXONE SODIUM 1000 MG: 10 INJECTION, POWDER, FOR SOLUTION INTRAVENOUS at 10:53

## 2022-10-18 RX ADMIN — POTASSIUM CHLORIDE 10 MEQ: 750 TABLET, EXTENDED RELEASE ORAL at 09:51

## 2022-10-18 RX ADMIN — LISINOPRIL 10 MG: 10 TABLET ORAL at 17:04

## 2022-10-18 RX ADMIN — CARBIDOPA AND LEVODOPA 1 TABLET: 25; 100 TABLET ORAL at 21:30

## 2022-10-18 RX ADMIN — METHYLNALTREXONE BROMIDE 6 MG: 12 INJECTION, SOLUTION SUBCUTANEOUS at 17:02

## 2022-10-18 RX ADMIN — OLANZAPINE 5 MG: 5 TABLET, FILM COATED ORAL at 21:30

## 2022-10-18 RX ADMIN — AMLODIPINE BESYLATE 10 MG: 10 TABLET ORAL at 09:51

## 2022-10-18 RX ADMIN — PANTOPRAZOLE SODIUM 40 MG: 40 TABLET, DELAYED RELEASE ORAL at 06:15

## 2022-10-18 RX ADMIN — HEPARIN SODIUM 5000 UNITS: 5000 INJECTION INTRAVENOUS; SUBCUTANEOUS at 06:14

## 2022-10-18 RX ADMIN — LACTULOSE 20 G: 20 SOLUTION ORAL at 17:02

## 2022-10-18 RX ADMIN — LEVOTHYROXINE SODIUM 100 MCG: 100 TABLET ORAL at 06:15

## 2022-10-18 RX ADMIN — PROPRANOLOL HYDROCHLORIDE 10 MG: 10 TABLET ORAL at 09:51

## 2022-10-18 RX ADMIN — PROPRANOLOL HYDROCHLORIDE 10 MG: 10 TABLET ORAL at 17:02

## 2022-10-18 NOTE — PROGRESS NOTES
INTERNAL MEDICINE RESIDENCY PROGRESS NOTE     Name: Lorena Dickinson   Age & Sex: 69 y o  female   MRN: [id number]  Unit/Bed#: CW2 218-01   Encounter: [id number]  Team: SOD Team C     PATIENT INFORMATION     Name: Lorena Dickinson   Age & Sex: 69 y o  female   MRN: [id number]  Hospital Stay Days: 2    ASSESSMENT/PLAN     Principal Problem:    Acute encephalopathy  Active Problems:    Other schizophrenia ([place] [address] )    Parkinson's disease ([place] [address] )    Primary hypertension    Acquired hypothyroidism    Seizure disorder ([facility] )    Suspected UTI    Respiratory failure, acute ([facility] )    CONCHITA (acute kidney injury) ([place] [address] )    Hyperammonemia ([place] [address] )      * Acute encephalopathy  Assessment & Plan  -Patient with history of Parkinson's, seizure disorder on Depakote, hypothyroidism and schizophrenia presenting with acute encephalopathy  Per patient's assisted care facility, patient has had a change in her mental status  Also had a fall on Wednesday without any head strike or loss of consciousness secondary to reported leg pain  She was being treated for suspected UTI with Keflex  Her POA, patient has had decreased mental status since prior admission for COVID-19 and ileus in a 2/2022  In the ED, patient was found to be afebrile and hemodynamically stable  She had increased oxygen requirement, requiring 2 L of O2  VBG revealed respiratory alkalosis  She was found to have an CONCHITA with creatinine 1 24 (baseline 0 6 - 0 7)  Ammonia elevated to 60  AST 61 and ALT 58  UA showed innumerable wbc's without any evidence of bacteria or nitrites  Chest x-ray and CT chest showed some mild pulmonary edema without any evidence of discrete infiltrates suggestive of pneumonia    Patient's presentation could be secondary to UTI versus hepatic encephalopathy versus worsening of hypothyroidism versus neurocognitive decline  - TSH, creatinine kinase, magnesium, phosphate, B12, folate WNL  -Depakote switched to Lamictal 20 mg => Lamictal discontinued yesterday  - previously on lactulose 20 g b i d  => has had only 2 bowel movement overnight => unclear whether this has led to a change in her mental status => AAOx3 today and appears to be back to baseline => also appears distended on exam today =>  Consider stopping lactulose   - urine culture negative  - will receive last dose of ceftriaxone today  - continue with home carbidopa levodopa and Zyprexa  - consider geriatrics consult given advanced age, comorbidities, and recent change in mental status  - made level 1 code status based on conversation with facility  However, given that POA was expressed desire for level 3 should reassess in a m  Hyperammonemia ([facility] )  Assessment & Plan  -patient presenting with acute encephalopathy  This found to have ammonia 60, AST 61, ALT 58  Was on Depakote for seizures  Cannot rule out possibility of hepatic encephalopathy  -Appears to be back to baseline mental status, per conversation with POA and Facility  - previously on lactulose 20 g b i d  => only one bowel movement overnight => Consider stopping over concern for abdominal distension  - follow-up repeat ammonia level    CONCHITA (acute kidney injury) ([facility] )  Assessment & Plan  -on admission, creatinine 1 24 with baseline 0 6-0 8  Of note, her GFR has decreased to 42 from 85 in 02/2022  Her CONCHITA is likely early secondary to prerenal causes  Suspect she had poor p o  Intake secondary to her UTI  Cannot rule out possibility of urinary retention as cause for CONCHITA  Appears volume down on exam   - Creatinine 1 24 on presentation => Given gentle IVF => Improved to 0 96 today  - Lisinopril initially held => Given CONCHITA has resolved => Consider adding back lisinopril  - avoid hypotension and nephrotoxic agents      Respiratory failure, acute ([facility] )  Assessment & Plan  -On presentation, patient was placed on 2 L O2 and was satting at around 95-96%    Per chart review, patient was on supplemental oxygen during last admission in February 2022 and was weaned off by discharge  Chest x-ray and chest CT concerning for pulmonary edema  No noted previous echocardiograms on file  -   - Echo showed some grade 1 diastolic dysfunction without any evidence of systolic dysfunction  - chest x-ray showed improvement in pulmonary edema bilaterally  -Still requiring 1 L O2  - continue to wean O2 as tolerated  - Patient placed off oxygen on AM of 10/16 and ambulated to bathroom without distress  Suspected UTI  Assessment & Plan  -Patient was having dysuria, could be renal and altered mental status at facility  PTA she had a UA concerning for and pyuria  Was also reported to have low-grade temp of 100 1°  Was started on Keflex of which she received 3 days  Her urine culture from facility grew skin say  UA in the ED showed innumerable wbc's without any evidence of nitrites or bacteria  Was given 1 dose Rocephin in ED  UTI could potentially be contributing to patient's current presentation of acute encephalopathy  - urine culture negative  - on IV ceftriaxone 1 g Q 24 (on Day 3) => last day of treatment    Seizure disorder [facility])  Assessment & Plan  -patient is on Depakote 250 mg a m  And Depakote 500 mg at bedtime  However, patient is presenting with acute encephalopathy and was find to have hyperammonemia with ammonia 60  Have some concern for hepatic encephalopathy  - Depakote level: 59  - Depakote held on admission over concern for hepatic encephalopathy  - neurology was consulted and believed that patient did not have any seizure history per chart review and conversation was POA  Depakote was initiated for mood stabilization     -neurology recommended starting Lamictal 25 mg daily => however over concerns of acute encephalopathy and potential adverse effect of SJS did not initiate Lamictal  - pharmacy recommended optimizing Zyprexa rather than adding back Lamictal or Depakote for mood stabilization    Acquired hypothyroidism  Assessment & Plan  -patient presenting with acute encephalopathy  Has a history of acquired hypothyroidism and is on levothyroxine 100 mcg at home  Most recent TSH on  was 5 14  - TSH at admission was normal at 3 690  - continue home levothyroxine 100 mcg    Primary hypertension  Assessment & Plan  -at home on propranolol 10 mg b i d , lisinopril 10 mg daily, amlodipine 10 mg daily  - lisinopril initially held in setting of CONCHITA => CONCHITA has resolved consider adding back  - continue home propranolol and amlodipine    Parkinson's disease ([facility] )  Assessment & Plan  -continue home carbidopa levodopa  mg b i d  Other schizophrenia ([facility] )  Assessment & Plan  -reported to be on Zyprexa 7 5 mg at home  -yesterday, Zyprexa decreased to 5 mg      Disposition:  Pending medical stabilization    SUBJECTIVE     Patient seen and examined  No acute events overnight  This morning, patient has no acute complaints  She denies any lightheadedness, dizziness, chest pain, abdominal pain, nausea, vomiting, diarrhea, or constipation  OBJECTIVE     Vitals:    10/17/22 2304 10/18/22 0455 10/18/22 0600 10/18/22 0756   BP: 159/86 155/85  154/82   BP Location:       Pulse: 91 85  88   Resp:    18   Temp: 97 9 °F (36 6 °C)   (!) 97 4 °F (36 3 °C)   TempSrc:       SpO2: 93% 91%  (!) 87%   Weight:   102 kg (224 lb 11 2 oz)    Height:          Temperature:   Temp (24hrs), Av 9 °F (36 6 °C), Min:97 4 °F (36 3 °C), Max:98 3 °F (36 8 °C)    Temperature: (!) 97 4 °F (36 3 °C)  Intake & Output:  I/O       10/16 0701  10/17 0700 10/17 0701  10/18 07    P  O   940    Total Intake(mL/kg)  940 (9 4)    Urine (mL/kg/hr) 1300 (0 5) 600 (0 3)    Stool 0 0    Total Output 1300 600    Net -1300 +340          Unmeasured Stool Occurrence 1 x 1 x        Weights:        Body mass index is 36 27 kg/m²    Weight (last 2 days)     Date/Time Weight    10/18/22 0600 102 (224 7)    10/17/22 0820 100 (221)    10/17/22 0600 101 (221 6)    10/16/22 0600 101 (222)        Physical Exam  Constitutional:       General: She is not in acute distress  Appearance: She is obese  HENT:      Head: Normocephalic and atraumatic  Mouth/Throat:      Mouth: Mucous membranes are dry  Pharynx: Oropharynx is clear  Eyes:      Conjunctiva/sclera: Conjunctivae normal    Cardiovascular:      Rate and Rhythm: Normal rate and regular rhythm  Heart sounds: No murmur heard  No friction rub  No gallop  Pulmonary:      Effort: Pulmonary effort is normal  No respiratory distress  Breath sounds: No wheezing or rales  Comments: No appreciable rales in anterior lung fields  Abdominal:      General: There is distension  Tenderness: There is abdominal tenderness  There is no guarding  Comments: Abdomen distended with diffuse tenderness to palpation   Musculoskeletal:      Right lower leg: Edema present  Left lower leg: Edema present  Comments: Had 1+ pitting edema bilaterally; tenderness to palpation of bilateral lower extremity   Skin:     General: Skin is warm and dry  Neurological:      Mental Status: She is alert and oriented to person, place, and time  Comments: Oriented to person, place, and time; tangential and easier to redirect in conversation today   Psychiatric:      Comments: Tangential in conversation       LABORATORY DATA     Labs: I have personally reviewed pertinent reports    Results from last 7 days   Lab Units 10/18/22  0904 10/17/22  0529 10/16/22  0613 10/15/22  1301   WBC Thousand/uL 13 61* 11 15* 9 00 7 97   HEMOGLOBIN g/dL 11 1* 10 3* 11 9 10 0*   HEMATOCRIT % 35 2 33 2* 37 6 31 6*   PLATELETS Thousands/uL 203 144* 105* 126*   NEUTROS PCT %  --   --  68 72   MONOS PCT %  --   --  19* 15*   MONO PCT %  --  10  --   --       Results from last 7 days   Lab Units 10/17/22  0529 10/16/22  0613 10/15/22  1301   POTASSIUM mmol/L 5 2 4 7 4 5   CHLORIDE mmol/L 110* 112* 112*   CO2 mmol/L 27 24 23   BUN mg/dL 36* 40* 52*   CREATININE mg/dL 0 96 0  94 1 24   CALCIUM mg/dL 9 4 9 4 8 9   ALK PHOS U/L  --   --  73   ALT U/L  --   --  58   AST U/L  --   --  61*     Results from last 7 days   Lab Units 10/16/22  0613   MAGNESIUM mg/dL 2 4     Results from last 7 days   Lab Units 10/16/22  0613   PHOSPHORUS mg/dL 2 7                    IMAGING & DIAGNOSTIC TESTING     Radiology Results: I have personally reviewed pertinent reports  XR chest portable    Result Date: 10/17/2022  Impression: 1  Low lung volumes with bibasilar opacification, increased on the right  2   Decreased pulmonary venous congestion  Workstation performed: [id number]     XR chest 1 view portable    Result Date: 10/16/2022  Impression: Low lung volumes with vascular crowding with mild bibasilar atelectasis and pulmonary venous congestion  Workstation performed: [id number]     CT head without contrast    Result Date: 10/15/2022  Impression: No acute intracranial abnormality  Workstation performed: [id number]     CT chest without contrast    Result Date: 10/15/2022  Impression: Markedly compromised by respiratory motion with probable mild interstitial edema  Trace effusions with mild bibasilar atelectasis  Workstation performed: [id number]     CT cervical spine without contrast    Result Date: 10/15/2022  Impression: No cervical spine fracture or traumatic malalignment  Severe degenerative disc disease from C3 to C7  Workstation performed: [id number]     Other Diagnostic Testing: I have personally reviewed pertinent reports      ACTIVE MEDICATIONS     Current Facility-Administered Medications   Medication Dose Route Frequency   • acetaminophen (TYLENOL) tablet 650 mg  650 mg Oral Q8H PRN   • amLODIPine (NORVASC) tablet 10 mg  10 mg Oral Daily   • atorvastatin (LIPITOR) tablet 40 mg  40 mg Oral Daily   • carbidopa-levodopa (SINEMET)  mg per tablet 1 tablet  1 tablet Oral BID   • cefTRIAXone (ROCEPHIN) 1,000 mg in dextrose 5 % 50 mL IVPB  1,000 mg Intravenous Once   • gabapentin (NEURONTIN) capsule 100 mg  100 mg Oral TID   • heparin (porcine) subcutaneous injection 5,000 Units  5,000 Units Subcutaneous Q8H [address]   • lactulose oral solution 20 g  20 g Oral BID   • levothyroxine tablet 100 mcg  100 mcg Oral Early Morning   • OLANZapine (ZyPREXA) tablet 5 mg  5 mg Oral HS   • pantoprazole (PROTONIX) EC tablet 40 mg  40 mg Oral Early Morning   • polyethylene glycol (MIRALAX) packet 17 g  17 g Oral BID   • potassium chloride (K-DUR,KLOR-CON) CR tablet 10 mEq  10 mEq Oral BID   • propranolol (INDERAL) tablet 10 mg  10 mg Oral BID   • senna (SENOKOT) tablet 8 6 mg  1 tablet Oral HS PRN       VTE Pharmacologic Prophylaxis:  Heparin  VTE Mechanical Prophylaxis: SCD    Portions of the record may have been created with voice recognition software  Occasional wrong word or "sound a like" substitutions may have occurred due to the inherent limitations of voice recognition software    Read the chart carefully and recognize, using context, where substitutions have occurred   ==  [facility]  Internal Medicine Residency PGY-1

## 2022-10-19 VITALS
RESPIRATION RATE: 18 BRPM | BODY MASS INDEX: 35.36 KG/M2 | OXYGEN SATURATION: 90 % | TEMPERATURE: 98.3 F | SYSTOLIC BLOOD PRESSURE: 87 MMHG | WEIGHT: 220 LBS | DIASTOLIC BLOOD PRESSURE: 59 MMHG | HEART RATE: 80 BPM | HEIGHT: 66 IN

## 2022-10-19 LAB
ANION GAP SERPL CALCULATED.3IONS-SCNC: 4 MMOL/L (ref 4–13)
BUN SERPL-MCNC: 32 MG/DL (ref 5–25)
CALCIUM SERPL-MCNC: 9.3 MG/DL (ref 8.3–10.1)
CHLORIDE SERPL-SCNC: 106 MMOL/L (ref 96–108)
CO2 SERPL-SCNC: 27 MMOL/L (ref 21–32)
CREAT SERPL-MCNC: 0.75 MG/DL (ref 0.6–1.3)
ERYTHROCYTE [DISTWIDTH] IN BLOOD BY AUTOMATED COUNT: 15.7 % (ref 11.6–15.1)
GFR SERPL CREATININE-BSD FRML MDRD: 77 ML/MIN/1.73SQ M
GLUCOSE SERPL-MCNC: 113 MG/DL (ref 65–140)
HCT VFR BLD AUTO: 31.8 % (ref 34.8–46.1)
HGB BLD-MCNC: 10.4 G/DL (ref 11.5–15.4)
MCH RBC QN AUTO: 28.6 PG (ref 26.8–34.3)
MCHC RBC AUTO-ENTMCNC: 32.7 G/DL (ref 31.4–37.4)
MCV RBC AUTO: 87 FL (ref 82–98)
PLATELET # BLD AUTO: 229 THOUSANDS/UL (ref 149–390)
PMV BLD AUTO: 10.1 FL (ref 8.9–12.7)
POTASSIUM SERPL-SCNC: 5.1 MMOL/L (ref 3.5–5.3)
RBC # BLD AUTO: 3.64 MILLION/UL (ref 3.81–5.12)
SODIUM SERPL-SCNC: 137 MMOL/L (ref 135–147)
WBC # BLD AUTO: 15.26 THOUSAND/UL (ref 4.31–10.16)

## 2022-10-19 PROCEDURE — NC001 PR NO CHARGE: Performed by: INTERNAL MEDICINE

## 2022-10-19 PROCEDURE — 97530 THERAPEUTIC ACTIVITIES: CPT

## 2022-10-19 PROCEDURE — 85027 COMPLETE CBC AUTOMATED: CPT

## 2022-10-19 PROCEDURE — 99238 HOSP IP/OBS DSCHRG MGMT 30/<: CPT | Performed by: INTERNAL MEDICINE

## 2022-10-19 PROCEDURE — 80048 BASIC METABOLIC PNL TOTAL CA: CPT

## 2022-10-19 RX ORDER — LACTULOSE 20 G/30ML
20 SOLUTION ORAL 2 TIMES DAILY
Qty: 5400 ML | Refills: 0
Start: 2022-10-19

## 2022-10-19 RX ORDER — OLANZAPINE 5 MG/1
5 TABLET ORAL
Qty: 90 TABLET | Refills: 0
Start: 2022-10-19

## 2022-10-19 RX ORDER — SENNA PLUS 8.6 MG/1
1 TABLET ORAL DAILY
Qty: 90 TABLET | Refills: 0
Start: 2022-10-19

## 2022-10-19 RX ADMIN — GABAPENTIN 100 MG: 100 CAPSULE ORAL at 09:16

## 2022-10-19 RX ADMIN — CARBIDOPA AND LEVODOPA 1 TABLET: 25; 100 TABLET ORAL at 09:28

## 2022-10-19 RX ADMIN — LACTULOSE 20 G: 20 SOLUTION ORAL at 09:16

## 2022-10-19 RX ADMIN — ATORVASTATIN CALCIUM 40 MG: 40 TABLET, FILM COATED ORAL at 09:16

## 2022-10-19 RX ADMIN — PROPRANOLOL HYDROCHLORIDE 10 MG: 10 TABLET ORAL at 09:16

## 2022-10-19 RX ADMIN — PANTOPRAZOLE SODIUM 40 MG: 40 TABLET, DELAYED RELEASE ORAL at 05:47

## 2022-10-19 RX ADMIN — POLYETHYLENE GLYCOL 3350 17 G: 17 POWDER, FOR SOLUTION ORAL at 09:16

## 2022-10-19 RX ADMIN — LEVOTHYROXINE SODIUM 100 MCG: 100 TABLET ORAL at 05:47

## 2022-10-19 RX ADMIN — LISINOPRIL 10 MG: 10 TABLET ORAL at 09:16

## 2022-10-19 RX ADMIN — HEPARIN SODIUM 5000 UNITS: 5000 INJECTION INTRAVENOUS; SUBCUTANEOUS at 13:12

## 2022-10-19 RX ADMIN — POTASSIUM CHLORIDE 10 MEQ: 750 TABLET, EXTENDED RELEASE ORAL at 09:16

## 2022-10-19 RX ADMIN — AMLODIPINE BESYLATE 10 MG: 10 TABLET ORAL at 09:16

## 2022-10-19 RX ADMIN — HEPARIN SODIUM 5000 UNITS: 5000 INJECTION INTRAVENOUS; SUBCUTANEOUS at 05:47

## 2022-10-19 NOTE — CASE MANAGEMENT
Case Management Discharge Planning Note    Patient name Evgeny Jones  Location 2 218/2 259-65 MRN [id number]  : 1945 Date 10/19/2022       Current Admission Date: 10/15/2022  Current Admission Diagnosis:Acute encephalopathy   Patient Active Problem List    Diagnosis Date Noted   • Acute encephalopathy 10/15/2022   • Respiratory failure, acute ([address] ) 10/15/2022   • CONCHITA (acute kidney injury) ([address] ) 10/15/2022   • Hyperammonemia ([address] ) 10/15/2022   • Thrombocytopenia ([address] ) 10/15/2022   • Suspected UTI 2022   • Other schizophrenia ([address] ) 2022   • Parkinson's disease ([address] ) 2022   • Primary hypertension 2022   • Urinary incontinence 2022   • Acquired hypothyroidism 2022   • COVID-19 2022   • Ileus ([address] ) 2022   • Hyponatremia 2022   • Seizure disorder ([address] ) 2022      LOS (days): 3  Geometric Mean LOS (GMLOS) (days): 4 60  Days to GMLOS:1 4     OBJECTIVE:  Risk of Unplanned Readmission Score: 18 65         Current admission status: Inpatient   Preferred Pharmacy:   [facility] TO E-PRESCRIBE  No address on file      Primary Care Provider: Porter Godfrey MD    Primary Insurance: [facility]  Secondary Insurance: [employer]:           Dispatcher Contacted: Yes  Number/Name of Dispatcher: [phone]/[phone]  Transported by [employer] and Unit #): Matti Dom[facility]nguez B[facility]  ETA of Transport (Date): 10/19/22  ETA of Transport (Time): 1530     Transfer Mode: Stretcher        IMM Given (Date):: 10/19/22  IMM Given to[de-identified] Designee  Family notified[de-identified] Gordo Daigle  Additional Comments: Patient cleared for discharge today back to [facility] , transportation arranged and confirmed via Becky Weston at [phone], nursing, facility notified of discharge arrangements      Accepting Facility Name, [address] [address] : [facility]   Receiving Facility/Agency Phone Number: [phone]  Facility/Agency Fax Number: [phone]

## 2022-10-19 NOTE — ASSESSMENT & PLAN NOTE
-last admission in February 2022 was found to have ileus and sigmoidoscopy revealed angulated sigmoid colon => recommended to take MiraLax 17 g b i d  On discharge  - was treated for hepatic encephalopathy on this admission with lactulose 20 g b i d  => on 10/18 patient was found to have distended abdomen and moderate abdominal tenderness on exam => CT abdomen pelvis without contrast revealed moderate stool burden decreased from previously in February  - will continue with lactulose 20 g b i d  => titrate to 2 to 3 bowel movements per day  - can give milk of magnesia q 1 hour p r n  Until bowel movement  - likely discharge once has bowel movement  - will discharge on Senokot scheduled, lactulose 20 g b i d , MiraLax 17 g b i d

## 2022-10-19 NOTE — PLAN OF CARE
Problem: PHYSICAL THERAPY ADULT  Goal: Performs mobility at highest level of function for planned discharge setting  See evaluation for individualized goals  Description: Treatment/Interventions: OT, Spoke to nursing, Gait training, Bed mobility, Patient/family training, Endurance training, LE strengthening/ROM, Functional transfer training          See flowsheet documentation for full assessment, interventions and recommendations  Outcome: Not Progressing  Note: Prognosis: Fair  Problem List: Decreased strength, Decreased endurance, Impaired balance, Decreased mobility, Decreased coordination, Decreased cognition, Impaired judgement, Decreased safety awareness, Pain  Assessment: Pt seen for session for setup, bed mob, time spent EOB w/ sitting activities, transfers/standing trial, repositioning  Pt cooperative but limited by pain and needs encouragement to participate  Note sitting balance to be a bit worse initially today w/ significant porterior LOB  Improved some post wt shifting activities  Did not bear much wt w/ standing attempt, and this also caused the patient increased pain  continue to recommend return to facility w/ continued therapy as appropriate  Barriers to Discharge: Inaccessible home environment, Decreased caregiver support     PT Discharge Recommendation: Return to facility with rehabilitation services    See flowsheet documentation for full assessment

## 2022-10-19 NOTE — PHYSICAL THERAPY NOTE
Physical Therapy Treatment Note       10/19/22 1105   PT Last Visit   PT Visit Date 10/19/22   Note Type   Note Type Treatment   Pain Assessment   Pain Assessment Tool FLACC   Pain Rating: FLACC (Rest) - Face 0   Pain Rating: FLACC (Rest) - Legs 0   Pain Rating: FLACC (Rest) - Activity 0   Pain Rating: FLACC (Rest) - Cry 0   Pain Rating: FLACC (Rest) - Consolability 0   Score: FLACC (Rest) 0   Pain Rating: FLACC (Activity) - Face 1   Pain Rating: FLACC (Activity) - Legs 1   Pain Rating: FLACC (Activity) - Activity 1   Pain Rating: FLACC (Activity) - Cry 1   Pain Rating: FLACC (Activity) - Consolability 1   Score: FLACC (Activity) 5   Restrictions/Precautions   Weight Bearing Precautions Per Order No   Other Precautions Cognitive; Bed Alarm; Chair Alarm;Multiple lines;Telemetry; Fall Risk;Pain   General   Chart Reviewed Yes   Family/Caregiver Present No   Cognition   Overall Cognitive Status Impaired   Arousal/Participation Responsive;Arousable   Attention Attends with cues to redirect   Orientation Level Oriented to person;Oriented to place   Memory Unable to assess   Following Commands Follows one step commands inconsistently   Subjective   Subjective cooperative w/ session w/ encouragement  states she has pain in her joints, and limited by pain  Bed Mobility   Supine to Sit 2  Maximal assistance   Additional items Assist x 2   Sit to Supine 2  Maximal assistance   Additional items Assist x 2   Additional Comments sat EOB x approx 15-20 min, focus on tolerance  initial heavy posterior LOB, requeiring max A to maintain    after sitting several minutes, decreased to min A   cues to increase anterior wt shift, and performed wt shifting activities to assist w/ achieving midline   Transfers   Sit to Stand 2  Maximal assistance   Additional items Assist x 2   Stand to Sit 2  Maximal assistance   Additional items Assist x 2   Additional Comments standing trial x 1- able to clear approx 10% of bed but limited by pain, fear   asking to sit back at EOB   Balance   Static Sitting Poor +   Dynamic Sitting Poor   Static Standing Poor -   Endurance Deficit   Endurance Deficit Yes   Endurance Deficit Description fatigue, weakness, pain   Activity Tolerance   Activity Tolerance Patient limited by fatigue;Patient limited by pain;Treatment limited secondary to medical complications (Comment)   Medical Staff Made Aware yes   Assessment   Prognosis Fair   Problem List Decreased strength;Decreased endurance; Impaired balance;Decreased mobility; Decreased coordination;Decreased cognition; Impaired judgement;Decreased safety awareness;Pain   Assessment Pt seen for session for setup, bed mob, time spent EOB w/ sitting activities, transfers/standing trial, repositioning  Pt cooperative but limited by pain and needs encouragement to participate  Note sitting balance to be a bit worse initially today w/ significant porterior LOB  Improved some post wt shifting activities  Did not bear much wt w/ standing attempt, and this also caused the patient increased pain  continue to recommend return to facility w/ continued therapy as appropriate  Goals   Patient Goals none stated   STG Expiration Date 10/29/22   PT Treatment Day 1   Plan   Treatment/Interventions Functional transfer training;LE strengthening/ROM; Therapeutic exercise; Endurance training;Patient/family training;Equipment eval/education; Bed mobility   Progress Slow progress, medical status limitations   PT Frequency 2-3x/wk   Recommendation   PT Discharge Recommendation Return to facility with rehabilitation services   AM-PAC Basic Mobility Inpatient   Turning in Bed Without Bedrails 2   Lying on Back to Sitting on Edge of Flat Bed 1   Moving Bed to Chair 1   Standing Up From Chair 1   Walk in Room 1   Climb 3-5 Stairs 1   Basic Mobility Inpatient Raw Score 7   Highest Level Of Mobility   JH-HLM Goal 2: Bed activities/Dependent transfer   JH-HLM Achieved 3: Sit at edge of bed   [facility]  Mara Pat, DPT CSRS

## 2022-10-19 NOTE — PROGRESS NOTES
INTERNAL MEDICINE RESIDENCY PROGRESS NOTE     Name: Manuel Bateman   Age & Sex: 69 y o  female   MRN: [id number]  Unit/Bed#: 2 218-01   Encounter: [id number]  Team: SOD Team C     PATIENT INFORMATION     Name: Manuel Bateman   Age & Sex: 69 y o  female   MRN: [id number]  Hospital Stay Days: 3    ASSESSMENT/PLAN     Principal Problem:    Acute encephalopathy  Active Problems:    Other schizophrenia ([facility] )    Parkinson's disease ([facility] )    Primary hypertension    Acquired hypothyroidism    Ileus ([facility] )    Seizure disorder ([facility] )    Suspected UTI    Respiratory failure, acute ([facility] )    CONCHITA (acute kidney injury) ([place] [address] )    Hyperammonemia ([facility] )      * Acute encephalopathy  Assessment & Plan  -Patient with history of Parkinson's, seizure disorder on Depakote, hypothyroidism and schizophrenia presenting with acute encephalopathy  Per patient's assisted care facility, patient has had a change in her mental status  Also had a fall on Wednesday without any head strike or loss of consciousness secondary to reported leg pain  She was being treated for suspected UTI with Keflex  Her POA, patient has had decreased mental status since prior admission for COVID-19 and ileus in a 2/2022  In the ED, patient was found to be afebrile and hemodynamically stable  She had increased oxygen requirement, requiring 2 L of O2  VBG revealed respiratory alkalosis  She was found to have an CONCHITA with creatinine 1 24 (baseline 0 6 - 0 7)  Ammonia elevated to 60  AST 61 and ALT 58  UA showed innumerable wbc's without any evidence of bacteria or nitrites  Chest x-ray and CT chest showed some mild pulmonary edema without any evidence of discrete infiltrates suggestive of pneumonia    Patient's presentation could be secondary to UTI versus hepatic encephalopathy versus worsening of hypothyroidism versus neurocognitive decline  - TSH, creatinine kinase, magnesium, phosphate, B12, folate WNL  -Depakote switched to Lamictal 20 mg => Lamictal discontinued - yesterday, abdomen  Distended  CT abdomen pelvis revealed stool burden without any evidence of acute diverticulitis  - will try milk of magnesia Q 1 hours p r n  Until bowel movement => likely can go home if has bowel  - on discharge will provide Senokot scheduled, lactulose 20 g b i d  And MiraLax b i d   -continue with lactulose 20 g b i d   - urine culture less than 10,000 CFU E coli  -finished course of ceftriaxone  - continue with home carbidopa levodopa and Zyprexa  - consider geriatrics consult given advanced age, comorbidities, and recent change in mental status  - made level 1 code status based on conversation with facility  However, given that POA was expressed desire for level 3 should reassess in a m  Hyperammonemia ([facility] )  Assessment & Plan  -patient presenting with acute encephalopathy  This found to have ammonia 60, AST 61, ALT 58  Was on Depakote for seizures  Cannot rule out possibility of hepatic encephalopathy  -Appears to be back to baseline mental status, per conversation with POA and Facility  - continue lactulose 20 g b i d  And titrate to 2-3 bowel movements  - follow-up repeat ammonia level    CONCHITA (acute kidney injury) ([facility] )  Assessment & Plan  -on admission, creatinine 1 24 with baseline 0 6-0 8  Of note, her GFR has decreased to 42 from 85 in 02/2022  Her CONCHITA is likely early secondary to prerenal causes  Suspect she had poor p o  Intake secondary to her UTI  Cannot rule out possibility of urinary retention as cause for CONCHITA  Appears volume down on exam   - Creatinine 1 24 on presentation => Given gentle IVF => Improved to 0 96 today  - Lisinopril initially held => Given CONCHITA has resolved => Consider adding back lisinopril  - avoid hypotension and nephrotoxic agents      Respiratory failure, acute ([facility] )  Assessment & Plan  -On presentation, patient was placed on 2 L O2 and was satting at around 95-96%    Per chart review, patient was on supplemental oxygen during last admission in February 2022 and was weaned off by discharge  Chest x-ray and chest CT concerning for pulmonary edema  No noted previous echocardiograms on file  -   - Echo showed some grade 1 diastolic dysfunction without any evidence of systolic dysfunction  - chest x-ray showed improvement in pulmonary edema bilaterally  -weaned off oxygen to room air       Suspected UTI  Assessment & Plan  -Patient was having dysuria, could be renal and altered mental status at facility  PTA she had a UA concerning for and pyuria  Was also reported to have low-grade temp of 100 1°  Was started on Keflex of which she received 3 days  Her urine culture from facility grew skin say  UA in the ED showed innumerable wbc's without any evidence of nitrites or bacteria  Was given 1 dose Rocephin in ED  UTI could potentially be contributing to patient's current presentation of acute encephalopathy  - urine culture grew less than 10,000 CFU E coli  - finish 3 day course of ceftriaxone    Seizure disorder [facility])  Assessment & Plan  -patient is on Depakote 250 mg a m  And Depakote 500 mg at bedtime  However, patient is presenting with acute encephalopathy and was find to have hyperammonemia with ammonia 60  Have some concern for hepatic encephalopathy  - Depakote level: 59  - Depakote held on admission over concern for hepatic encephalopathy  - neurology was consulted and believed that patient did not have any seizure history per chart review and conversation was POA  Depakote was initiated for mood stabilization     -neurology recommended starting Lamictal 25 mg daily => however over concerns of acute encephalopathy and potential adverse effect of SJS did not initiate Lamictal  - pharmacy recommended optimizing Zyprexa rather than adding back Lamictal or Depakote for mood stabilization    Ileus [facility])  Assessment & Plan  -last admission in February 2022 was found to have ileus and sigmoidoscopy revealed angulated sigmoid colon => recommended to take MiraLax 17 g b i d  On discharge  - was treated for hepatic encephalopathy on this admission with lactulose 20 g b i d  => on 10/18 patient was found to have distended abdomen and moderate abdominal tenderness on exam => CT abdomen pelvis without contrast revealed moderate stool burden decreased from previously in February  - will continue with lactulose 20 g b i d  => titrate to 2 to 3 bowel movements per day  - can give milk of magnesia q 1 hour p r n  Until bowel movement  - likely discharge once has bowel movement  - will discharge on Senokot scheduled, lactulose 20 g b i d , MiraLax 17 g b i d  Acquired hypothyroidism  Assessment & Plan  -patient presenting with acute encephalopathy  Has a history of acquired hypothyroidism and is on levothyroxine 100 mcg at home  Most recent TSH on 0 1/31 was 5 14  - TSH at admission was normal at 3 690  - continue home levothyroxine 100 mcg    Primary hypertension  Assessment & Plan  -at home on propranolol 10 mg b i d , lisinopril 10 mg daily, amlodipine 10 mg daily  - lisinopril initially held in setting of CONCHITA => CONCHITA has resolved consider adding back  - continue home propranolol and amlodipine    Parkinson's disease ([facility] )  Assessment & Plan  -continue home carbidopa levodopa  mg b i d  Other schizophrenia ([facility] )  Assessment & Plan  -reported to be on Zyprexa 7 5 mg at home  -Zyprexa decreased to 5 mg      Disposition: Pending discharge back to facility    SUBJECTIVE     Patient seen and examined  No acute events overnight  Patient currently has no complaints  Denies chest pain, shortness of breath, fevers, chills, abdominal pain, diarrhea, constipation, nausea, or vomiting       OBJECTIVE     Vitals:    10/18/22 2115 10/18/22 2319 10/19/22 0600 10/19/22 0722   BP:  130/78  131/80   BP Location:    Left arm   Pulse:    81   Resp:    18   Temp:  99 7 °F (37 6 °C)  98 °F (36 7 °C)   TempSrc:    Oral   SpO2: 97%   96%   Weight:   99 8 kg (220 lb)    Height:          Temperature:   Temp (24hrs), Av 6 °F (37 °C), Min:98 °F (36 7 °C), Max:99 7 °F (37 6 °C)    Temperature: 98 °F (36 7 °C)  Intake & Output:  I/O       10/17 0701  10/18 0700 10/18 0701  10/19 07    P  O  1140 200    Total Intake(mL/kg) 1140 (11 2) 200 (2)    Urine (mL/kg/hr) 975 (0 4) 1000 (0 4)    Stool 0 0    Total Output 975 1000    Net +165 -800          Unmeasured Stool Occurrence 1 x 1 x        Weights:        Body mass index is 35 51 kg/m²  Weight (last 2 days)     Date/Time Weight    10/19/22 06 99 8 (220)    10/18/22 0600 102 (224 7)    10/17/22 0820 100 (221)    10/17/22 0600 101 (221 6)        Physical Exam  Constitutional:       General: She is not in acute distress  Appearance: Normal appearance  She is obese  HENT:      Head: Normocephalic and atraumatic  Mouth/Throat:      Mouth: Mucous membranes are dry  Eyes:      Extraocular Movements: Extraocular movements intact  Cardiovascular:      Rate and Rhythm: Normal rate and regular rhythm  Pulses: Normal pulses  Heart sounds: Normal heart sounds  No murmur heard  No friction rub  Pulmonary:      Effort: Pulmonary effort is normal       Breath sounds: Normal breath sounds  No wheezing or rales  Abdominal:      General: Bowel sounds are normal  There is no distension  Palpations: Abdomen is soft  Tenderness: There is no abdominal tenderness  Musculoskeletal:      Right lower leg: Edema present  Left lower leg: Edema present  Comments: +1 bilateral pitting edema and tenderness bilaterally    Skin:     General: Skin is warm and dry  Neurological:      General: No focal deficit present  Mental Status: She is alert and oriented to person, place, and time  Comments: Tangential in conversation  Psychiatric:         Mood and Affect: Mood normal          Behavior: Behavior normal        LABORATORY DATA     Labs: I have personally reviewed pertinent reports    Results from last 7 days   Lab Units 10/19/22  0552 10/18/22  0904 10/17/22  0529 10/16/22  0613 10/15/22  1301   WBC Thousand/uL 15 26* 13 61* 11 15* 9 00 7 97   HEMOGLOBIN g/dL 10 4* 11 1* 10 3* 11 9 10 0*   HEMATOCRIT % 31 8* 35 2 33 2* 37 6 31 6*   PLATELETS Thousands/uL 229 203 144* 105* 126*   NEUTROS PCT %  --   --   --  68 72   MONOS PCT %  --   --   --  19* 15*   MONO PCT %  --   --  10  --   --       Results from last 7 days   Lab Units 10/19/22  0552 10/18/22  0904 10/17/22  0529 10/16/22  0613 10/15/22  1301   POTASSIUM mmol/L 5 1 4 6 5 2   < > 4 5   CHLORIDE mmol/L 106 107 110*   < > 112*   CO2 mmol/L 27 25 27   < > 23   BUN mg/dL 32* 32* 36*   < > 52*   CREATININE mg/dL 0 75 0 96 0 96   < > 1 24   CALCIUM mg/dL 9 3 9 3 9 4   < > 8 9   ALK PHOS U/L  --   --   --   --  73   ALT U/L  --   --   --   --  58   AST U/L  --   --   --   --  61*    < > = values in this interval not displayed  Results from last 7 days   Lab Units 10/16/22  0613   MAGNESIUM mg/dL 2 4     Results from last 7 days   Lab Units 10/16/22  0613   PHOSPHORUS mg/dL 2 7                    IMAGING & DIAGNOSTIC TESTING     Radiology Results: I have personally reviewed pertinent reports  CT abdomen pelvis wo contrast    Result Date: 10/18/2022  Impression: Constipation, less severe compared with January 31, 2022  Otherwise no acute pathology  Workstation performed: [id number]     XR chest portable    Result Date: 10/17/2022  Impression: 1  Low lung volumes with bibasilar opacification, increased on the right  2   Decreased pulmonary venous congestion  Workstation performed: [id number]     XR chest 1 view portable    Result Date: 10/16/2022  Impression: Low lung volumes with vascular crowding with mild bibasilar atelectasis and pulmonary venous congestion  Workstation performed: [id number]     CT head without contrast    Result Date: 10/15/2022  Impression: No acute intracranial abnormality   Workstation performed: [id number]     CT chest without contrast    Result Date: 10/15/2022  Impression: Markedly compromised by respiratory motion with probable mild interstitial edema  Trace effusions with mild bibasilar atelectasis  Workstation performed: [id number]     CT cervical spine without contrast    Result Date: 10/15/2022  Impression: No cervical spine fracture or traumatic malalignment  Severe degenerative disc disease from C3 to C7  Workstation performed: [id number]     Other Diagnostic Testing: I have personally reviewed pertinent reports  ACTIVE MEDICATIONS     Current Facility-Administered Medications   Medication Dose Route Frequency   • acetaminophen (TYLENOL) tablet 650 mg  650 mg Oral Q8H PRN   • amLODIPine (NORVASC) tablet 10 mg  10 mg Oral Daily   • atorvastatin (LIPITOR) tablet 40 mg  40 mg Oral Daily   • carbidopa-levodopa (SINEMET)  mg per tablet 1 tablet  1 tablet Oral BID   • gabapentin (NEURONTIN) capsule 100 mg  100 mg Oral TID   • heparin (porcine) subcutaneous injection 5,000 Units  5,000 Units Subcutaneous Q8H [address]   • lactulose oral solution 20 g  20 g Oral BID   • levothyroxine tablet 100 mcg  100 mcg Oral Early Morning   • lisinopril (ZESTRIL) tablet 10 mg  10 mg Oral Daily   • magnesium hydroxide (MILK OF MAGNESIA) oral suspension 30 mL  30 mL Oral Q1H PRN   • OLANZapine (ZyPREXA) tablet 5 mg  5 mg Oral HS   • pantoprazole (PROTONIX) EC tablet 40 mg  40 mg Oral Early Morning   • polyethylene glycol (MIRALAX) packet 17 g  17 g Oral BID   • potassium chloride (K-DUR,KLOR-CON) CR tablet 10 mEq  10 mEq Oral BID   • propranolol (INDERAL) tablet 10 mg  10 mg Oral BID   • senna (SENOKOT) tablet 8 6 mg  1 tablet Oral HS PRN       VTE Pharmacologic Prophylaxis: Heparin  VTE Mechanical Prophylaxis: SCD    Portions of the record may have been created with voice recognition software  Occasional wrong word or "sound a like" substitutions may have occurred due to the inherent limitations of voice recognition software  Read the chart carefully and recognize, using context, where substitutions have occurred   ==  [facility]  Internal Medicine Residency PGY-1

## 2022-10-19 NOTE — OCCUPATIONAL THERAPY NOTE
Occupational Therapy Progress Note     Patient Name: Yisel Villarreal  YKKWU'T Date: 10/19/2022  Problem List  Principal Problem:    Acute encephalopathy  Active Problems:    Other schizophrenia ([facility] )    Park[facility]'s disease ([facility] )    Primary hypertension    Acquired hypothyroidism    Ileus ([facility] )    Seizure disorder ([facility] )    Suspected UTI    Respiratory failure, acute ([facility] )    CONCHITA (acute kidney injury) ([facility] )    Hyperammonemia ([facility] )          10/19/22 1015   OT Last Visit   OT Visit Date 10/19/22   Note Type   Note Type Treatment   Pain Assessment   Pain Assessment Tool FLACC   Pain Location/Orientation Location: Neck; Location: Back   Pain Onset/Description Descriptor: Aching   Effect of Pain on Daily Activities Impacts ability to engage in valued occupations   Hospital Pain Intervention(s) Repositioned; Ambulation/increased activity; Emotional support   Pain Rating: FLACC (Rest) - Face 0   Pain Rating: FLACC (Rest) - Legs 0   Pain Rating: FLACC (Rest) - Activity 0   Pain Rating: FLACC (Rest) - Cry 0   Pain Rating: FLACC (Rest) - Consolability 0   Score: FLACC (Rest) 0   Pain Rating: FLACC (Activity) - Face 1   Pain Rating: FLACC (Activity) - Legs 1   Pain Rating: FLACC (Activity) - Activity 1   Pain Rating: FLACC (Activity) - Cry 1   Pain Rating: FLACC (Activity) - Consolability 1   Score: FLACC (Activity) 5   Restrictions/Precautions   Weight Bearing Precautions Per Order No   Other Precautions Fall Risk;Pain;Telemetry;O2;Cognitive; Bed Alarm   Lifestyle   Autonomy Receives assist with adls and iadls   Reciprocal Relationships supportive family and facility staff   Service to Others retired   Intrinsic Gratification sedentary   Bed Mobility   Supine to Sit 2  Maximal assistance   Additional items Assist x 2;HOB elevated; Bedrails; Increased time required;Verbal cues;LE management   Sit to Supine 2  Maximal assistance   Additional items Assist x 2;HOB elevated; Bedrails; Increased time required;Verbal cues;LE management Additional Comments Pt performed supine <> sit with Max A x2 for UB postural support and LE management and sat EOB with varying Min/Mod sitting balance; pt w/ increased report of pain during transitional movements; @ end of session, pt left lying supine in bed with all functional needs in reach w/ bed alarm activated   Transfers   Sit to Stand 2  Maximal assistance   Additional items Assist x 2; Increased time required;Verbal cues   Stand to Sit 2  Maximal assistance   Additional items Assist x 2; Increased time required;Verbal cues   Additional Comments Pt attempted to perform STS with Max A x2 w/ HHA however pt unable to clear bottom from EOB, w/ increased reports of pain, and verbal outbursts of winacing/grimacing 2* pain; appears pt may have increased fear of falling as appears to be self-limiting @ times; requested to return to EOB seated position and return to supine position   Functional Mobility   Additional Comments Did not perform @ this time 2* safety   Cognition   Overall Cognitive Status Impaired   Arousal/Participation Arousable; Cooperative   Attention Attends with cues to redirect   Memory Decreased recall of precautions;Decreased recall of recent events;Decreased short term memory   Following Commands Follows one step commands with increased time or repetition   Comments Pt presents with decreased safety awareness, judgement, and insight; appears to be self-limiting @ times as pt presents with fear of falling; @ times, pt repetitive in speech/tangental @ times, requiring verbal cues for redirection to task   Activity Tolerance   Activity Tolerance Patient limited by pain   Medical Staff Made Aware DPT Jeni Diop Pt is a 68 yo female who participated in skilled OT session on 10/19/2022  Pt seen for co-treat with PT to increase safety, decrease fall risk, and maximize functional/occupational performance 2* medical complexity which is a regression from pt's functional baseline  Treatment focused to improve functional transfers with fall prevention strategies, static/dynamic balance, postural/trunk control, proper body mechanics, functional use of b/l UE's, safety awareness, and overall increased activity tolerance in ADL/IADL/leisure tasks  Upon arrival, pt found lying supine in bed and was agreeable to OT session  Pt performed supine <> sit with Max A x2 for UB postural support and LE management with increased reports of pain in all joints during transitional movements  Pt sat EOB with varying Min/Mod sitting balance and attempted to complete STS with Max A x2 w/ HHA, however pt unable to clear bottom from EOB, appears pt may have increased fear of falling that is self-limiting as pt w reports of "put me down, I need to sit!" when attempting to complete STS  Pt returned to seated EOB position and declined all ADLs @ this time, requesting to return to supine position with Max A x2 for UB postural support and LE management  Pt demonstrates gradual functional improvements towards OT goals however continues to be functioning below baseline and is still limited by the following limitations/impairments which were addressed through skilled instruction: cognition, generalized weakness, balance, endurance/activity tolerance, postural/trunk control, strength, pain, and safety awareness  At this time, recommend discharge to return to facility w/ rehab if able to meet pt's current functional needs and assistance level, when medically stable  The patient's raw score on the AM-PAC Daily Activity inpatient short form is 14, standardized score is 33 39, less than 39 4  Patients at this level are likely to benefit from discharge to post-acute rehabilitation services  Please refer to the recommendation of the Occupational Therapist for safe discharge planning   Established OT goals will be continued 3-5/wk to address immediate acute care needs and underlying performance skills to maximize occupational performance and safety to return to PLOF  Plan   Treatment Interventions ADL retraining;Functional transfer training;UE strengthening/ROM; Endurance training;Cognitive reorientation;Patient/family training;Equipment evaluation/education; Compensatory technique education;Continued evaluation; Energy conservation; Activityengagement   Goal Expiration Date 10/31/22   OT Treatment Day 1   OT Frequency 3-5x/wk   Recommendation   OT Discharge Recommendation Return to facility with rehabilitation services  (vs  rehab pending ability to meet pt's current functional assistance level and needs)   AM-PAC Daily Activity Inpatient   Lower Body Dressing 2   Bathing 2   Toileting 2   Upper Body Dressing 2   Grooming 3   Eating 3   Daily Activity Raw Score 14   Daily Activity Standardized Score (Calc for Raw Score >=11) 33 39   AM-PAC Applied Cognition Inpatient   Following a Speech/Presentation 3   Understanding Ordinary Conversation 4   Taking Medications 3   Remembering Where Things Are Placed or Put Away 3   Remembering List of 4-5 Errands 2   Taking Care of Complicated Tasks 2   Applied Cognition Raw Score 17   Applied Cognition Standardized Score 36 52      Maritza Martinez MS, OTR/L

## 2022-10-19 NOTE — PLAN OF CARE
Problem: Potential for Falls  Goal: Patient will remain free of falls  Description: INTERVENTIONS:  - Educate patient/family on patient safety including physical limitations  - Instruct patient to call for assistance with activity   - Consult OT/PT to assist with strengthening/mobility   - Keep Call bell within reach  - Keep bed low and locked with side rails adjusted as appropriate  - Keep care items and personal belongings within reach  - Initiate and maintain comfort rounds  - Make Fall Risk Sign visible to staff  - Apply yellow socks and bracelet for high fall risk patients  - Consider moving patient to room near nurses station  Outcome: Progressing     Problem: MOBILITY - ADULT  Goal: Maintain or return to baseline ADL function  Description: INTERVENTIONS:  -  Assess patient's ability to carry out ADLs; assess patient's baseline for ADL function and identify physical deficits which impact ability to perform ADLs (bathing, care of mouth/teeth, toileting, grooming, dressing, etc )  - Assess/evaluate cause of self-care deficits   - Assess range of motion  - Assess patient's mobility; develop plan if impaired  - Assess patient's need for assistive devices and provide as appropriate  - Encourage maximum independence but intervene and supervise when necessary  - Involve family in performance of ADLs  - Assess for home care needs following discharge   - Consider OT consult to assist with ADL evaluation and planning for discharge  - Provide patient education as appropriate  Outcome: Progressing  Goal: Maintains/Returns to pre admission functional level  Description: INTERVENTIONS:  - Perform BMAT or MOVE assessment daily    - Set and communicate daily mobility goal to care team and patient/family/caregiver     - Collaborate with rehabilitation services on mobility goals if consulted  - Record patient progress and toleration of activity level   Outcome: Progressing     Problem: Prexisting or High Potential for Compromised Skin Integrity  Goal: Skin integrity is maintained or improved  Description: INTERVENTIONS:  - Identify patients at risk for skin breakdown  - Assess and monitor skin integrity  - Assess and monitor nutrition and hydration status  - Monitor labs   - Assess for incontinence   - Turn and reposition patient  - Assist with mobility/ambulation  - Relieve pressure over bony prominences  - Avoid friction and shearing  - Provide appropriate hygiene as needed including keeping skin clean and dry  - Evaluate need for skin moisturizer/barrier cream  - Collaborate with interdisciplinary team   - Patient/family teaching  - Consider wound care consult   Outcome: Progressing

## 2022-10-19 NOTE — DISCHARGE INSTRUCTIONS
Please start taking Sennakot every night and Lactulose 20 g twice daily  Adjust lactulose dosing to 2-3 bowel movements per day  Continue taking Miralax twice daily  Please STOP taking Depakote   Please START taking Zyprexa 5 mg daily  Follow-up with PCP within 1 week   Continue to stay hydrated and ambulate as tolerated

## 2022-10-20 NOTE — UTILIZATION REVIEW
Initial Clinical Review    WAS OBSERVATION 10/15/2022 @ 1703, CONVERTED TO INPATIENT ADMISSION 10/16/2022 @ 0836, DUE TO CONTINUED STAY REQUIRED TO CARE FOR PATIENT WITH    altered mental status - Consult Neuro  Admission: Date/Time/Statement:   Admission Orders (From admission, onward)     Ordered        10/16/22 0836  Inpatient Admission  Once            10/15/22 1703  Place in Observation  Once                      Orders Placed This Encounter   Procedures   • Inpatient Admission     Standing Status:   Standing     Number of Occurrences:   1     Order Specific Question:   Level of Care     Answer:   Med Surg [16]     Order Specific Question:   Estimated length of stay     Answer:   More than 2 Midnights     Order Specific Question:   Certification     Answer:   I certify that inpatient services are medically necessary for this patient for a duration of greater than two midnights  See H&P and MD Progress Notes for additional information about the patient's course of treatment  ED Arrival Information     Expected   10/15/2022 12:09    Arrival   10/15/2022 12:21    Acuity   Urgent            Means of arrival   Ambulance    Escorted by   [facility]    Service   [facility]    Admission type   Emergency            Arrival complaint   Sepsis           Chief Complaint   Patient presents with   • Possible UTI     On Keflex for UTI, increased confusion and lethargy  Initial Presentation: 69 y o  female , presented to the ED @ [facility], from [facility] [place] [place], via EMS  Admitted as Observation due to Acute Encephalopathy  Date: 10/15/2022  Having a change in mental care over the past week  Vasuhamilton Cortés on Wednesday  She was being treated for suspected UTI with Keflex  In the ED, patient was found to be afebrile and hemodynamically stable  She had increased oxygen requirement, requiring 2 L of O2  VBG revealed respiratory alkalosis    She was found to have an CONCHITA with creatinine 1 24 (baseline 0 6 - 0 7)  Ammonia elevated to 60  AST 61 and ALT 58  UA showed innumerable wbc's without any evidence of bacteria or nitrites  Chest x-ray and CT chest showed some mild pulmonary edema without any evidence of discrete infiltrates suggestive of pneumonia  Will obtain TSH, creatinine kinase, magnesium, phosphate, B12, folate  Hold Depakote in setting of potential hepatic encephalopathy  Will start lactulose 20 g b i d - titrate 2-3 BMs  Follow up  urine culture  Continue with IV ceftriaxone 1 g Q 24  Obtain bladder scan to assess for urinary retention  Continue with home carbidopa levodopa and Zyprexa  Gentle IV flds  Obtain BNP / ECHO  Wean O2 as tolerated  Currently on O2 @ 2L via NC   PT/OT  Day 1: 10/16/2022   Patient states that she feels like she needs to have a bowel movement, which would be her 1st after initiating lactulose treatment  Hold depakote  Continue lactulose  Continue IV Ceftriaxone  IV flds  O2 @ 2L via NC   PT/OT    10/16/2022  Consult Neuro: Altered mentation -encephalopathy secondary to UTI and hyperammonemia  At this time on chart review as well as per caregiver history and her Care everywhere review in [place] there is no documentation that patient has had seizures or a documented history of epilepsy  There have been no EEGs  Notes from November 2021 in [place] state that patient is on Depakote for mood disorder  Thus okay from my standpoint for this to be discontinued as she does not appear to have a seizure history  Another alternative with lesser risk of transaminitis and hyperammonemia, for mood is Lamictal however this needs to be titrated upward  This needs to be discussed with Psychiatry  Day 2:  10/17/2022   Lactulose - Monitor & trend Bowel Movements  Continue IV Abx  Continue IV flds  O2 @ 2L via NC   PT/OT        VTE Pharmacologic Prophylaxis:  Heparin  VTE Mechanical Prophylaxis: SCD    ED Triage Vitals   Temperature Pulse Respirations Blood Pressure SpO2   10/15/22 1224 10/15/22 1224 10/15/22 1224 10/15/22 1229 10/15/22 1224   97 6 °F (36 4 °C) 83 18 104/50 94 %      Temp Source Heart Rate Source Patient Position - Orthostatic VS BP Location FiO2 (%)   10/15/22 1224 10/15/22 1224 10/15/22 1224 10/15/22 1224 --   Oral Monitor Sitting Right arm       Pain Score       10/15/22 1542       No Pain          Wt Readings from Last 1 Encounters:   10/19/22 99 8 kg (220 lb)     Additional Vital Signs:    10/15/22 2230 10/16/22 0004 10/16/22 0012 10/16/22 0600   BP:     139/66     BP Location:     Right arm     Pulse: 79   78     Resp:           Temp: 99 1 °F (37 3 °C)         TempSrc:           SpO2: 91% 91% 91%     Weight:       101 kg (222 lb)      10/16/22 1610 10/16/22 1714 10/16/22 2342 10/17/22 0600   BP:   134/73 129/80     BP Location:   Left arm       Pulse: 91   82     Resp: 18         Temp:           TempSrc:           SpO2: 90%         Weight:       101 kg (221 lb 9 6 oz)       Pertinent Labs/Diagnostic Test Results:   CT abdomen pelvis wo contrast   Final Result by Michaela Severe, MD (10/18 7577)      Constipation, less severe compared with January 31, 2022  Otherwise no acute pathology  XR chest portable   Final Result by Salinas White MD (10/17 1538)      1  Low lung volumes with bibasilar opacification, increased on the right  2   Decreased pulmonary venous congestion  CT chest without contrast   Final Result by Teresa Graves MD (10/15 0206)      Markedly compromised by respiratory motion with probable mild interstitial edema  Trace effusions with mild bibasilar atelectasis  XR chest 1 view portable   Final Result by Teresa Graves MD (66/03 7851)      Low lung volumes with vascular crowding with mild bibasilar atelectasis and pulmonary venous congestion        CT cervical spine without contrast   Final Result by Willie Hernandez MD (10/15 7273)      No cervical spine fracture or traumatic malalignment  Severe degenerative disc disease from C3 to C7      CT head without contrast   Final Result by Wayne Oro MD (10/15 1402)      No acute intracranial abnormality          Results from last 7 days   Lab Units 10/15/22  2055   SARS-COV-2  Negative     Results from last 7 days   Lab Units 10/19/22  0552 10/18/22  0904 10/17/22  0529 10/16/22  0613 10/15/22  1301   WBC Thousand/uL 15 26* 13 61* 11 15* 9 00 7 97   HEMOGLOBIN g/dL 10 4* 11 1* 10 3* 11 9 10 0*   HEMATOCRIT % 31 8* 35 2 33 2* 37 6 31 6*   PLATELETS Thousands/uL 229 203 144* 105* 126*   NEUTROS ABS Thousands/µL  --   --   --  6 12 5 71   BANDS PCT %  --   --  5  --   --      Results from last 7 days   Lab Units 10/19/22  0552 10/18/22  0904 10/17/22  0529 10/16/22  0613 10/15/22  1301   SODIUM mmol/L 137 139 140 142 141   POTASSIUM mmol/L 5 1 4 6 5 2 4 7 4 5   CHLORIDE mmol/L 106 107 110* 112* 112*   CO2 mmol/L 27 25 27 24 23   ANION GAP mmol/L 4 7 3* 6 6   BUN mg/dL 32* 32* 36* 40* 52*   CREATININE mg/dL 0 75 0 96 0 96 0 94 1 24   EGFR ml/min/1 73sq m 77 57 57 59 42   CALCIUM mg/dL 9 3 9 3 9 4 9 4 8 9   MAGNESIUM mg/dL  --   --   --  2 4  --    PHOSPHORUS mg/dL  --   --   --  2 7  --      Results from last 7 days   Lab Units 10/18/22  0904 10/15/22  1301   AST U/L  --  61*   ALT U/L  --  58   ALK PHOS U/L  --  73   TOTAL PROTEIN g/dL  --  6 7   ALBUMIN g/dL  --  2 0*   TOTAL BILIRUBIN mg/dL  --  0 44   AMMONIA umol/L 44* 60*     Results from last 7 days   Lab Units 10/19/22  0552 10/18/22  0904 10/17/22  0529 10/16/22  0613 10/15/22  1301   GLUCOSE RANDOM mg/dL 113 168* 131 129 202*     Results from last 7 days   Lab Units 10/15/22  1333   PH VIKKI  7 418*   PCO2 VIKKI mm Hg 34 9*   PO2 VIKKI mm Hg 73 3*   HCO3 VIKKI mmol/L 22 0*   BASE EXC VIKKI mmol/L -2 0   O2 CONTENT VIKKI ml/dL 13 1   O2 HGB, VENOUS % 93 6*     Results from last 7 days   Lab Units 10/15/22  2253 10/15/22  2236   CK TOTAL U/L  --  187   CK MB INDEX % <1 0  --    CK MB ng/mL <1 0  --      Results from last 7 days   Lab Units 10/15/22  1542 10/15/22  1301   HS TNI 0HR ng/L  --  9   HS TNI 2HR ng/L 8  --    HSTNI D2 ng/L -1  --      Results from last 7 days   Lab Units 10/16/22  0613   TSH 3RD GENERATON uIU/mL 3 690     Results from last 7 days   Lab Units 10/15/22  1301   NT-PRO BNP pg/mL 505*     Results from last 7 days   Lab Units 10/15/22  1538 10/15/22  1532   CLARITY UA  Turbid Clear   COLOR UA  Yellow Yellow   SPEC GRAV UA  1 022 1 020   PH UA  6 0 6 0   GLUCOSE UA mg/dl Negative Negative   KETONES UA mg/dl Negative Negative   BLOOD UA  Moderate* Moderate*   PROTEIN UA mg/dl 70 (1+)* 100 (2+)*   NITRITE UA  Negative Negative   BILIRUBIN UA  Negative Negative   UROBILINOGEN UA E U /dl  --  1 0   UROBILINOGEN UA (BE) mg/dl <2 0  --    LEUKOCYTES UA  Large* Large*   WBC UA /hpf Innumerable*  --    RBC UA /hpf 10-20*  --    BACTERIA UA /hpf None Seen  --    EPITHELIAL CELLS WET PREP /hpf None Seen  --        Results from last 7 days   Lab Units 10/15/22  1538   URINE CULTURE  <10,000 cfu/ml Escherichia coli*     ED Treatment:   Medication Administration from 10/15/2022 1221 to 10/15/2022 2221       Date/Time Order Dose Route Action     10/15/2022 1303 sodium chloride 0 9 % bolus 1,000 mL 1,000 mL Intravenous New Bag     10/15/2022 1814 ceftriaxone (ROCEPHIN) 1 g/50 mL in dextrose IVPB 1,000 mg Intravenous New Bag     10/15/2022 2158 carbidopa-levodopa (SINEMET)  mg per tablet 1 tablet 1 tablet Oral Given     10/15/2022 2157 gabapentin (NEURONTIN) capsule 100 mg 100 mg Oral Given     10/15/2022 2157 OLANZapine (ZyPREXA) tablet 7 5 mg 7 5 mg Oral Given     10/15/2022 2157 potassium chloride (K-DUR,KLOR-CON) CR tablet 10 mEq 10 mEq Oral Given     10/15/2022 2157 propranolol (INDERAL) tablet 10 mg 10 mg Oral Given     10/15/2022 2158 lactulose oral solution 20 g 20 g Oral Given     10/15/2022 2158 heparin (porcine) subcutaneous injection 5,000 Units 5,000 Units Subcutaneous Given     10/15/2022 2203 multi-electrolyte (ISOLYTE-S PH 7 4 equivalent) IV solution 75 mL/hr Intravenous New Bag        Past Medical History:   Diagnosis Date   • Disease of thyroid gland    • GERD (gastroesophageal reflux disease)    • Hypertension    • Hypoglycemia 2/2/2022   • Mood disorder (HCC)    • Neuropathy    • Osteoporosis    • Parkinson disease ([facility] [address] )    • Schizophrenia ([place] [address] )    • Seizure ([place] [address] )    • Urinary incontinence    • Vitamin D deficiency      Present on Admission:  • Parkinson's disease ([facility] )  • Primary hypertension  • Seizure disorder ([facility] )  • Acquired hypothyroidism  • Suspected UTI  • Other schizophrenia ([place] [address] )  • Ileus ([place] [address] )      Admitting Diagnosis: Acute encephalopathy [G93 40]  Sepsis ([place] [address] ) [A41 9]  Age/Sex: 69 y o  female  Admission Orders:   Activity as tolerated  Consult PT/OT  CV diet  IS  Randolph SCDs  I&O / daily weight    Medications:  Current Facility-Administered Medications   Medication Dose Route Frequency   • acetaminophen (TYLENOL) tablet 650 mg  650 mg Oral Q8H PRN   • amLODIPine (NORVASC) tablet 10 mg  10 mg Oral Daily   • aspirin (ECOTRIN LOW STRENGTH) EC tablet 81 mg  81 mg Oral Daily   • atorvastatin (LIPITOR) tablet 40 mg  40 mg Oral Daily   • carbidopa-levodopa (SINEMET)  mg per tablet 1 tablet  1 tablet Oral BID   • ceftriaxone (ROCEPHIN) 1 g/50 mL in dextrose IVPB  1,000 mg Intravenous Q24H   • gabapentin (NEURONTIN) capsule 100 mg  100 mg Oral TID   • heparin (porcine) subcutaneous injection 5,000 Units  5,000 Units Subcutaneous Q8H [facility]   • lactulose oral solution 20 g  20 g Oral BID   • levothyroxine tablet 100 mcg  100 mcg Oral Early Morning   • multi-electrolyte (ISOLYTE-S PH 7 4 equivalent) IV solution  75 mL/hr Intravenous Continuous   • OLANZapine (ZyPREXA) tablet 7 5 mg  7 5 mg Oral HS   • pantoprazole (PROTONIX) EC tablet 40 mg  40 mg Oral Early Morning   • polyethylene glycol (MIRALAX) packet 17 g  17 g Oral BID   • potassium chloride (K-DUR,KLOR-CON) CR tablet 10 mEq  10 mEq Oral BID   • propranolol (INDERAL) tablet 10 mg  10 mg Oral BID   • senna (SENOKOT) tablet 8 6 mg  1 tablet Oral HS PRN         IP CONSULT TO NEUROLOGY  IP CONSULT TO CASE MANAGEMENT    Network Utilization Review Department  ATTENTION: Please call with any questions or concerns to [phone] and carefully listen to the prompts so that you are directed to the right person  All voicemails are confidential   Patricia Herrera all requests for admission clinical reviews, approved or denied determinations and any other requests to dedicated fax number below belonging to the campus where the patient is receiving treatment   List of dedicated fax numbers for the Facilities:  [address] DENIALS (Administrative/Medical Necessity) [phone]   [address] (Maternity/NICU/Pediatrics) [phone]   [address] [phone]   [facility] [phone]   [address] [place] [address] [facility] [phone]   [facility] [place] [facility] 134 815 [facility] [phone]

## 2022-10-20 NOTE — UTILIZATION REVIEW
NOTIFICATION OF ADMISSION DISCHARGE   This is a Notification of Discharge from [facility]  Please be advised that this patient has been discharge from our facility  Below you will find the admission and discharge date and time including the patient’s disposition  UTILIZATION REVIEW CONTACT:  Zena Valdez  Utilization   Network Utilization Review Department  Phone: [phone] x carefully listen to the prompts  All voicemails are confidential   Email: [email or web address]     ADMISSION INFORMATION  PRESENTATION DATE: 10/15/2022 12:21 PM  OBERVATION ADMISSION DATE:   INPATIENT ADMISSION DATE: 10/16/22  8:36 AM   DISCHARGE DATE: 10/19/2022  5:35 PM  DISPOSITION: Non [facility]/SNU Non [facility] SN[facility]/SNU      IMPORTANT INFORMATION:  Send all requests for admission clinical reviews, approved or denied determinations and any other requests to dedicated fax number below belonging to the campus where the patient is receiving treatment   List of dedicated fax numbers:  [address] DENIALS (Administrative/Medical Necessity) [phone]   [address] (Maternity/NICU/Pediatrics) [phone]   [facility] [phone]   [place] [address] [phone]   [address] [phone]   220 [facility] [phone]   [address] [place] [address] [phone]   1464 [facility] 119 [phone]   [facility]  [phone]   4058 [facility] [phone] 412 [facility] 850 [facility] [phone]

## 2023-06-14 NOTE — ANESTHESIA PREPROCEDURE EVALUATION
Procedure:  FLEXIBLE SIGMOIDOSCOPY    Relevant Problems   CARDIO   (+) Primary hypertension      ENDO   (+) Acquired hypothyroidism      GI/HEPATIC   (+) Ileus (HCC)      NEURO/PSYCH   (+) Other schizophrenia (HCC)   (+) Seizure disorder (HCC)      Hypertension    Disease of thyroid gland    Parkinson disease (HCC)    GERD (gastroesophageal reflux disease)    Vitamin D deficiency    Osteoporosis    Urinary incontinence    Mood disorder (HCC)    Seizure (HCC)    Neuropathy    Schizophrenia (HCC)        Physical Exam    Airway    Mallampati score: II  TM Distance: >3 FB  Neck ROM: full     Dental       Cardiovascular  Cardiovascular exam normal    Pulmonary  Pulmonary exam normal     Other Findings        Anesthesia Plan  ASA Score- 3     Anesthesia Type- IV sedation with anesthesia with ASA Monitors  Additional Monitors:   Airway Plan:           Plan Factors-Exercise tolerance (METS): >4 METS  Chart reviewed  EKG reviewed  Imaging results reviewed  Existing labs reviewed  Patient summary reviewed  Induction- intravenous  Postoperative Plan-     Informed Consent- Anesthetic plan and risks discussed with patient  I personally reviewed this patient with the CRNA  Discussed and agreed on the Anesthesia Plan with the CRNA  Karrie Nissen
Ambulatory